# Patient Record
Sex: MALE | Race: WHITE | NOT HISPANIC OR LATINO | URBAN - METROPOLITAN AREA
[De-identification: names, ages, dates, MRNs, and addresses within clinical notes are randomized per-mention and may not be internally consistent; named-entity substitution may affect disease eponyms.]

---

## 2017-01-16 VITALS
TEMPERATURE: 98 F | DIASTOLIC BLOOD PRESSURE: 61 MMHG | HEART RATE: 67 BPM | SYSTOLIC BLOOD PRESSURE: 133 MMHG | WEIGHT: 225.97 LBS | OXYGEN SATURATION: 97 % | RESPIRATION RATE: 16 BRPM

## 2017-01-16 NOTE — ASU PATIENT PROFILE, ADULT - PSH
H/O heart artery stent    H/O total knee replacement, left    S/P arthroscopy of knee  right-2 months ago H/O heart artery stent  x2  H/O total knee replacement, left    S/P arthroscopy of knee  right-2 months ago

## 2017-01-16 NOTE — ASU PATIENT PROFILE, ADULT - PMH
CAD (coronary artery disease)    Current use of long term anticoagulation    DM (diabetes mellitus)    History of throat cancer    HLD (hyperlipidemia)    HTN (hypertension)    Lower back pain  radiating to right knee  Neuropathy BPH (benign prostatic hypertrophy)    CAD (coronary artery disease)    Current use of long term anticoagulation    Depression    DM (diabetes mellitus)  type 2  GERD (gastroesophageal reflux disease)    History of throat cancer    HLD (hyperlipidemia)    HTN (hypertension)    Lower back pain  radiating to right knee  Neuropathy

## 2017-01-17 ENCOUNTER — OUTPATIENT (OUTPATIENT)
Dept: OUTPATIENT SERVICES | Facility: HOSPITAL | Age: 67
LOS: 1 days | Discharge: HOME | End: 2017-01-17
Payer: MEDICARE

## 2017-01-17 ENCOUNTER — APPOINTMENT (OUTPATIENT)
Dept: OTOLARYNGOLOGY | Facility: HOSPITAL | Age: 67
End: 2017-01-17

## 2017-01-17 VITALS
RESPIRATION RATE: 18 BRPM | DIASTOLIC BLOOD PRESSURE: 58 MMHG | HEART RATE: 74 BPM | TEMPERATURE: 97 F | SYSTOLIC BLOOD PRESSURE: 133 MMHG | OXYGEN SATURATION: 97 %

## 2017-01-17 DIAGNOSIS — Z95.5 PRESENCE OF CORONARY ANGIOPLASTY IMPLANT AND GRAFT: Chronic | ICD-10-CM

## 2017-01-17 DIAGNOSIS — Z96.652 PRESENCE OF LEFT ARTIFICIAL KNEE JOINT: Chronic | ICD-10-CM

## 2017-01-17 DIAGNOSIS — Z98.89 OTHER SPECIFIED POSTPROCEDURAL STATES: Chronic | ICD-10-CM

## 2017-01-17 PROCEDURE — 31536 LARYNGOSCOPY W/BX & OP SCOPE: CPT

## 2017-01-17 PROCEDURE — 31540 LARYNGOSCOPY W/EXC OF TUMOR: CPT | Mod: LT

## 2017-01-17 PROCEDURE — 94660 CPAP INITIATION&MGMT: CPT

## 2017-01-17 PROCEDURE — 88305 TISSUE EXAM BY PATHOLOGIST: CPT

## 2017-01-17 RX ORDER — ACETAMINOPHEN 500 MG
325 TABLET ORAL EVERY 4 HOURS
Qty: 0 | Refills: 0 | Status: DISCONTINUED | OUTPATIENT
Start: 2017-01-17 | End: 2017-01-17

## 2017-01-17 RX ORDER — ACETAMINOPHEN WITH CODEINE 300MG-30MG
1 TABLET ORAL
Qty: 12 | Refills: 0
Start: 2017-01-17 | End: 2017-01-20

## 2017-01-17 RX ORDER — ONDANSETRON 8 MG/1
4 TABLET, FILM COATED ORAL EVERY 6 HOURS
Qty: 0 | Refills: 0 | Status: DISCONTINUED | OUTPATIENT
Start: 2017-01-17 | End: 2017-01-17

## 2017-01-17 RX ORDER — SODIUM CHLORIDE 9 MG/ML
1000 INJECTION, SOLUTION INTRAVENOUS
Qty: 0 | Refills: 0 | Status: DISCONTINUED | OUTPATIENT
Start: 2017-01-17 | End: 2017-01-17

## 2017-01-17 RX ORDER — MORPHINE SULFATE 50 MG/1
4 CAPSULE, EXTENDED RELEASE ORAL
Qty: 0 | Refills: 0 | Status: DISCONTINUED | OUTPATIENT
Start: 2017-01-17 | End: 2017-01-17

## 2017-01-17 RX ORDER — ACETAMINOPHEN WITH CODEINE 300MG-30MG
10 TABLET ORAL
Qty: 15 | Refills: 0
Start: 2017-01-17 | End: 2017-01-22

## 2017-01-17 NOTE — PACU DISCHARGE NOTE - COMMENTS
pt verbalized understanding all discharged instructions  iv disocntniuo  pt left accompanied by family menber

## 2017-01-19 LAB — SURGICAL PATHOLOGY STUDY: SIGNIFICANT CHANGE UP

## 2017-01-23 ENCOUNTER — APPOINTMENT (OUTPATIENT)
Dept: SPINE | Facility: CLINIC | Age: 67
End: 2017-01-23

## 2017-01-23 DIAGNOSIS — Z79.02 LONG TERM (CURRENT) USE OF ANTITHROMBOTICS/ANTIPLATELETS: ICD-10-CM

## 2017-01-23 DIAGNOSIS — Z79.84 LONG TERM (CURRENT) USE OF ORAL HYPOGLYCEMIC DRUGS: ICD-10-CM

## 2017-01-23 DIAGNOSIS — Z79.82 LONG TERM (CURRENT) USE OF ASPIRIN: ICD-10-CM

## 2017-01-23 DIAGNOSIS — E66.9 OBESITY, UNSPECIFIED: ICD-10-CM

## 2017-01-23 DIAGNOSIS — J38.1 POLYP OF VOCAL CORD AND LARYNX: ICD-10-CM

## 2017-01-23 DIAGNOSIS — N40.0 BENIGN PROSTATIC HYPERPLASIA WITHOUT LOWER URINARY TRACT SYMPTOMS: ICD-10-CM

## 2017-01-23 DIAGNOSIS — R23.4 CHANGES IN SKIN TEXTURE: ICD-10-CM

## 2017-01-23 DIAGNOSIS — I25.10 ATHEROSCLEROTIC HEART DISEASE OF NATIVE CORONARY ARTERY WITHOUT ANGINA PECTORIS: ICD-10-CM

## 2017-01-23 DIAGNOSIS — I10 ESSENTIAL (PRIMARY) HYPERTENSION: ICD-10-CM

## 2017-01-23 DIAGNOSIS — Z85.21 PERSONAL HISTORY OF MALIGNANT NEOPLASM OF LARYNX: ICD-10-CM

## 2017-01-23 DIAGNOSIS — E78.00 PURE HYPERCHOLESTEROLEMIA, UNSPECIFIED: ICD-10-CM

## 2017-01-23 DIAGNOSIS — F32.9 MAJOR DEPRESSIVE DISORDER, SINGLE EPISODE, UNSPECIFIED: ICD-10-CM

## 2017-01-23 DIAGNOSIS — K21.9 GASTRO-ESOPHAGEAL REFLUX DISEASE WITHOUT ESOPHAGITIS: ICD-10-CM

## 2017-01-23 DIAGNOSIS — E11.9 TYPE 2 DIABETES MELLITUS WITHOUT COMPLICATIONS: ICD-10-CM

## 2017-01-23 DIAGNOSIS — J38.3 OTHER DISEASES OF VOCAL CORDS: ICD-10-CM

## 2017-01-25 ENCOUNTER — APPOINTMENT (OUTPATIENT)
Dept: OTOLARYNGOLOGY | Facility: CLINIC | Age: 67
End: 2017-01-25

## 2017-01-25 VITALS
BODY MASS INDEX: 26.15 KG/M2 | TEMPERATURE: 97.9 F | SYSTOLIC BLOOD PRESSURE: 123 MMHG | HEIGHT: 78 IN | DIASTOLIC BLOOD PRESSURE: 65 MMHG | WEIGHT: 226 LBS | HEART RATE: 89 BPM

## 2017-01-30 ENCOUNTER — APPOINTMENT (OUTPATIENT)
Dept: OTOLARYNGOLOGY | Facility: CLINIC | Age: 67
End: 2017-01-30

## 2017-01-30 PROBLEM — K21.9 GASTRO-ESOPHAGEAL REFLUX DISEASE WITHOUT ESOPHAGITIS: Chronic | Status: ACTIVE | Noted: 2017-01-16

## 2017-01-30 PROBLEM — N40.0 BENIGN PROSTATIC HYPERPLASIA WITHOUT LOWER URINARY TRACT SYMPTOMS: Chronic | Status: ACTIVE | Noted: 2017-01-16

## 2017-02-06 ENCOUNTER — APPOINTMENT (OUTPATIENT)
Dept: SPINE | Facility: CLINIC | Age: 67
End: 2017-02-06

## 2017-03-06 ENCOUNTER — APPOINTMENT (OUTPATIENT)
Dept: SPINE | Facility: CLINIC | Age: 67
End: 2017-03-06

## 2017-03-06 VITALS
HEART RATE: 67 BPM | WEIGHT: 230 LBS | BODY MASS INDEX: 36.1 KG/M2 | SYSTOLIC BLOOD PRESSURE: 134 MMHG | DIASTOLIC BLOOD PRESSURE: 76 MMHG | HEIGHT: 67 IN

## 2017-03-27 ENCOUNTER — APPOINTMENT (OUTPATIENT)
Dept: SPINE | Facility: CLINIC | Age: 67
End: 2017-03-27

## 2017-04-19 ENCOUNTER — APPOINTMENT (OUTPATIENT)
Dept: OTOLARYNGOLOGY | Facility: CLINIC | Age: 67
End: 2017-04-19

## 2017-05-15 ENCOUNTER — APPOINTMENT (OUTPATIENT)
Dept: OTOLARYNGOLOGY | Facility: CLINIC | Age: 67
End: 2017-05-15

## 2017-08-07 ENCOUNTER — APPOINTMENT (OUTPATIENT)
Dept: OTOLARYNGOLOGY | Facility: CLINIC | Age: 67
End: 2017-08-07
Payer: MEDICARE

## 2017-08-07 PROCEDURE — 99214 OFFICE O/P EST MOD 30 MIN: CPT | Mod: 25

## 2017-08-07 PROCEDURE — 31575 DIAGNOSTIC LARYNGOSCOPY: CPT

## 2017-11-08 ENCOUNTER — APPOINTMENT (OUTPATIENT)
Dept: OTOLARYNGOLOGY | Facility: CLINIC | Age: 67
End: 2017-11-08
Payer: MEDICARE

## 2017-11-08 PROCEDURE — 99213 OFFICE O/P EST LOW 20 MIN: CPT | Mod: 25

## 2017-11-08 PROCEDURE — 31575 DIAGNOSTIC LARYNGOSCOPY: CPT

## 2018-03-12 ENCOUNTER — APPOINTMENT (OUTPATIENT)
Dept: OTOLARYNGOLOGY | Facility: CLINIC | Age: 68
End: 2018-03-12
Payer: MEDICARE

## 2018-03-12 PROCEDURE — 31575 DIAGNOSTIC LARYNGOSCOPY: CPT

## 2018-03-12 PROCEDURE — 99214 OFFICE O/P EST MOD 30 MIN: CPT | Mod: 25

## 2018-03-26 ENCOUNTER — APPOINTMENT (OUTPATIENT)
Dept: OTOLARYNGOLOGY | Facility: CLINIC | Age: 68
End: 2018-03-26
Payer: MEDICARE

## 2018-03-26 VITALS
TEMPERATURE: 98.6 F | DIASTOLIC BLOOD PRESSURE: 71 MMHG | HEART RATE: 65 BPM | SYSTOLIC BLOOD PRESSURE: 132 MMHG | WEIGHT: 230 LBS | BODY MASS INDEX: 36.1 KG/M2 | HEIGHT: 67 IN

## 2018-03-26 PROCEDURE — 31575 DIAGNOSTIC LARYNGOSCOPY: CPT

## 2018-03-26 PROCEDURE — 99213 OFFICE O/P EST LOW 20 MIN: CPT | Mod: 25

## 2018-04-02 ENCOUNTER — APPOINTMENT (OUTPATIENT)
Dept: SPINE | Facility: CLINIC | Age: 68
End: 2018-04-02
Payer: MEDICARE

## 2018-04-02 VITALS
DIASTOLIC BLOOD PRESSURE: 77 MMHG | SYSTOLIC BLOOD PRESSURE: 133 MMHG | BODY MASS INDEX: 34.21 KG/M2 | HEART RATE: 64 BPM | WEIGHT: 218 LBS | HEIGHT: 67 IN

## 2018-04-02 PROCEDURE — 99213 OFFICE O/P EST LOW 20 MIN: CPT

## 2018-04-04 ENCOUNTER — APPOINTMENT (OUTPATIENT)
Dept: OTOLARYNGOLOGY | Facility: CLINIC | Age: 68
End: 2018-04-04

## 2018-09-24 ENCOUNTER — APPOINTMENT (OUTPATIENT)
Dept: OTOLARYNGOLOGY | Facility: CLINIC | Age: 68
End: 2018-09-24
Payer: MEDICARE

## 2018-09-24 VITALS
DIASTOLIC BLOOD PRESSURE: 71 MMHG | HEIGHT: 67 IN | WEIGHT: 215 LBS | OXYGEN SATURATION: 98 % | BODY MASS INDEX: 33.74 KG/M2 | HEART RATE: 73 BPM | SYSTOLIC BLOOD PRESSURE: 148 MMHG

## 2018-09-24 PROCEDURE — 31575 DIAGNOSTIC LARYNGOSCOPY: CPT

## 2018-09-24 PROCEDURE — 99213 OFFICE O/P EST LOW 20 MIN: CPT | Mod: 25

## 2019-01-23 ENCOUNTER — APPOINTMENT (OUTPATIENT)
Dept: OTOLARYNGOLOGY | Facility: CLINIC | Age: 69
End: 2019-01-23
Payer: MEDICARE

## 2019-01-23 VITALS
OXYGEN SATURATION: 97 % | HEIGHT: 67 IN | BODY MASS INDEX: 33.74 KG/M2 | HEART RATE: 74 BPM | WEIGHT: 215 LBS | SYSTOLIC BLOOD PRESSURE: 123 MMHG | DIASTOLIC BLOOD PRESSURE: 57 MMHG

## 2019-01-23 PROCEDURE — 99213 OFFICE O/P EST LOW 20 MIN: CPT

## 2019-01-24 NOTE — ASSESSMENT
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

## 2019-01-24 NOTE — HISTORY OF PRESENT ILLNESS
[de-identified] : 67 y/o male with history of glottic cancer treated with primary radiation in 2010, developed some hoarseness last year was noted to have TVC lesion, benign on excision. Denies neck masses/lesions, dysphonia, dysphagia, weight changes, fever.  [FreeTextEntry1] : pt doing well, no issues since last visit, eating and speaking well, weight is stable

## 2019-01-24 NOTE — REVIEW OF SYSTEMS
[Negative] : Psychiatric [Hoarseness] : no hoarseness [Throat Pain] : no throat pain [Swelling Neck] : no neck swelling [Fever] : no fever [Chills] : no chills [Feeling Poorly] : not feeling poorly [Feeling Tired] : not feeling tired [Chest Pain] : no chest pain [Shortness Of Breath] : no shortness of breath [Confused] : no confusion [Convulsions] : no convulsions [Swollen Glands] : no swollen glands [Swollen Glands In The Neck] : no swollen glands in the neck

## 2019-01-24 NOTE — PROCEDURE
[de-identified] : flexible fiberoptic laryngoscope advanced orally, no oropharyngeal lesions identified, larynx visualized no ulcerating or fungating masses, no leukoplakic or erythroplakic lesions or nodules \par there is mucous in the glottis but no masses or polyps/nodules; true vocal cords abduct and adduct and meet in the midline, no sign of cancer

## 2019-07-24 ENCOUNTER — APPOINTMENT (OUTPATIENT)
Dept: OTOLARYNGOLOGY | Facility: CLINIC | Age: 69
End: 2019-07-24
Payer: MEDICARE

## 2019-07-24 VITALS
DIASTOLIC BLOOD PRESSURE: 68 MMHG | HEIGHT: 64 IN | HEART RATE: 81 BPM | OXYGEN SATURATION: 98 % | SYSTOLIC BLOOD PRESSURE: 159 MMHG | BODY MASS INDEX: 37.56 KG/M2 | WEIGHT: 220 LBS

## 2019-07-24 PROCEDURE — 31575 DIAGNOSTIC LARYNGOSCOPY: CPT

## 2019-07-24 PROCEDURE — 99212 OFFICE O/P EST SF 10 MIN: CPT | Mod: 25

## 2019-07-24 NOTE — HISTORY OF PRESENT ILLNESS
[de-identified] : 68 y/o male with history of glottic cancer treated with primary radiation in 2010, developed some hoarseness last year was noted to have TVC lesion, benign on excision. Denies neck masses/lesions, dysphonia, dysphagia, weight changes, fever. \par  pt doing well, no issues since last visit, eating and speaking well, weight is stable. \par

## 2019-07-24 NOTE — PROCEDURE
[Image(s) Captured] : image(s) captured and filed [Unable to Cooperate with Mirror] : patient unable to cooperate with mirror [None] : none [Flexible Endoscope] : examined with the flexible endoscope [Normal] : the false vocal folds were pink and regular, the ventricular sulcus was open, the true vocal folds were glistening white, tense and of equal length, mobility, and height [True Vocal Cords Paralysis] : no true vocal cord paralysis [True Vocal Cords Erythematous] : no true vocal cord edema [True Vocal Cords Ferreira's Nodules] : no true vocal cord nodules [de-identified] : surveillance for glottic cancer [de-identified] : transoral laryngosocpy [de-identified] : N

## 2019-07-24 NOTE — ASSESSMENT
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

## 2019-07-24 NOTE — REASON FOR VISIT
[Subsequent Evaluation] : a subsequent evaluation for [FreeTextEntry2] : surveillance glottic cancer

## 2019-07-24 NOTE — PHYSICAL EXAM
[Midline] : trachea located in midline position [Laryngoscopy Performed] : laryngoscopy was performed, see procedure section for findings [Normal] : extraocular movements are normal

## 2019-10-23 ENCOUNTER — APPOINTMENT (OUTPATIENT)
Dept: OTOLARYNGOLOGY | Facility: CLINIC | Age: 69
End: 2019-10-23
Payer: MEDICARE

## 2019-10-23 VITALS
SYSTOLIC BLOOD PRESSURE: 136 MMHG | DIASTOLIC BLOOD PRESSURE: 72 MMHG | OXYGEN SATURATION: 98 % | WEIGHT: 220 LBS | HEART RATE: 69 BPM | BODY MASS INDEX: 37.56 KG/M2 | HEIGHT: 64 IN

## 2019-10-23 PROCEDURE — 31575 DIAGNOSTIC LARYNGOSCOPY: CPT

## 2019-10-23 PROCEDURE — 99213 OFFICE O/P EST LOW 20 MIN: CPT | Mod: 25

## 2019-10-25 NOTE — REASON FOR VISIT
[Subsequent Evaluation] : a subsequent evaluation for [FreeTextEntry2] : surveillance of glottic cancer

## 2019-10-25 NOTE — PROCEDURE
[None] : none [Unable to Cooperate with Mirror] : patient unable to cooperate with mirror [Image(s) Captured] : image(s) captured and filed [Flexible Endoscope] : examined with the flexible endoscope [de-identified] : Flexible fiberoptic laryngoscope advanced orally and nasally, no oral or nasopharyngeal lesions or masses identified, larynx visualized, adequate and symmetric cord adduction and abduction noted, no glottic masses, lesions, leukoplakia noted.\par  good minus

## 2019-10-25 NOTE — ASSESSMENT
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

## 2019-10-25 NOTE — HISTORY OF PRESENT ILLNESS
[de-identified] : 70 y/o male with history of glottic cancer treated with primary radiation in 2010, developed some hoarseness was noted to have TVC lesion, benign on 1/17/17 excision.   \par \par Pt doing well, eating and speaking well, weight is stable. Reports sore throat for a few months, denies cough, nasal congestion, PND, fever, chills, LAD, dysphagia.

## 2019-12-04 ENCOUNTER — APPOINTMENT (OUTPATIENT)
Dept: OTOLARYNGOLOGY | Facility: CLINIC | Age: 69
End: 2019-12-04
Payer: MEDICARE

## 2019-12-04 VITALS
BODY MASS INDEX: 37.56 KG/M2 | HEIGHT: 64 IN | DIASTOLIC BLOOD PRESSURE: 68 MMHG | HEART RATE: 73 BPM | SYSTOLIC BLOOD PRESSURE: 166 MMHG | WEIGHT: 220 LBS | OXYGEN SATURATION: 98 %

## 2019-12-04 PROCEDURE — 99215 OFFICE O/P EST HI 40 MIN: CPT | Mod: 25

## 2019-12-04 PROCEDURE — 31575 DIAGNOSTIC LARYNGOSCOPY: CPT

## 2019-12-04 NOTE — HISTORY OF PRESENT ILLNESS
[FreeTextEntry1] : Reports sore throat for a few months associated with left sided globus, cough and hoarseness. He denies having any recent  viral illness that precipitated his symptoms. He has GERD/LPR and currently on omeprazole. \par \par He denies otalgia, weight loss, aspiration or dysphagia.\par \par  [de-identified] : 70 y/o male with history of glottic cancer treated with primary radiation in 2010, developed some hoarseness was noted to have TVC lesion, benign on 1/17/17 excision. \par \par

## 2019-12-04 NOTE — REASON FOR VISIT
[Subsequent Evaluation] : a subsequent evaluation for [FreeTextEntry2] : Glottic cancer surveillance with new sore throat and hoarseness

## 2019-12-04 NOTE — PHYSICAL EXAM
[Midline] : trachea located in midline position [Laryngoscopy Performed] : laryngoscopy was performed, see procedure section for findings [Normal] : cranial nerves 2-12 intact

## 2019-12-04 NOTE — ASSESSMENT
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 Normal scope with no obvious glottic or supraglottic lesions. \par - Continue to observe\par - Patient was counselled about LPR conservative measures\par - WE will consider imaging if symptoms persists\par - Patient to call office if symptoms progress or persist\par - F/u in 2  months

## 2019-12-04 NOTE — PROCEDURE
[Image(s) Captured] : image(s) captured and filed [None] : none [Flexible Endoscope] : examined with the flexible endoscope [Unable to Cooperate with Mirror] : patient unable to cooperate with mirror [Normal] : the false vocal folds were pink and regular, the ventricular sulcus was open, the true vocal folds were glistening white, tense and of equal length, mobility, and height [True Vocal Cords Erythematous] : no true vocal cord edema [True Vocal Cords Paralysis] : no true vocal cord paralysis [True Vocal Cords Ferreira's Nodules] : no true vocal cord nodules [de-identified] : advanced trans orally

## 2020-01-22 ENCOUNTER — APPOINTMENT (OUTPATIENT)
Dept: OTOLARYNGOLOGY | Facility: CLINIC | Age: 70
End: 2020-01-22

## 2020-03-04 ENCOUNTER — APPOINTMENT (OUTPATIENT)
Dept: OTOLARYNGOLOGY | Facility: CLINIC | Age: 70
End: 2020-03-04
Payer: MEDICARE

## 2020-03-04 VITALS
WEIGHT: 220 LBS | TEMPERATURE: 98.7 F | BODY MASS INDEX: 37.56 KG/M2 | OXYGEN SATURATION: 98 % | HEART RATE: 82 BPM | HEIGHT: 64 IN | SYSTOLIC BLOOD PRESSURE: 174 MMHG | DIASTOLIC BLOOD PRESSURE: 66 MMHG

## 2020-03-04 PROCEDURE — 31575 DIAGNOSTIC LARYNGOSCOPY: CPT | Mod: 58

## 2020-03-04 PROCEDURE — 99215 OFFICE O/P EST HI 40 MIN: CPT | Mod: 25

## 2020-03-04 NOTE — PROCEDURE
[Image(s) Captured] : image(s) captured and filed [Unable to Cooperate with Mirror] : patient unable to cooperate with mirror [None] : none [Flexible Endoscope] : examined with the flexible endoscope [Normal] : normal vallecula [Epiglottal Inflammation] : ~M edematous bilaterally [Glottis Edema] : ~M edematous in the midline [Epiglottis] : ~M hypertrophic in the midline

## 2020-03-05 NOTE — ASSESSMENT
[FreeTextEntry1] : No evidence of glottic disease\par Chronic laryngitis with thick secretions\par LPR/GERD on lifestyle modification and pantoprazole\par \par - Trial of zpack for laryngitis\par - Continue LPR treatment- consider GI consult if no improvement after the antibiotic trial for laryngitis\par - Patient to call clinic after the completion of the antibiotic

## 2020-03-05 NOTE — HISTORY OF PRESENT ILLNESS
[de-identified] : 70 y/o male with history of glottic cancer treated with primary radiation in 2010, developed some hoarseness was noted to have TVC lesion, benign on 1/17/17 excision. \par \par \par  [FreeTextEntry1] : Patient reports fluctuating hoarseness for the last couple of weeks associated with throat clearing, globus, night time coughing and choking. He reports heartburn and GERD and he has been on dietary/lifestyle modification& pantoprazole which helps to relieve his symptoms. He denies weight loss, night sweats, pneumonia, neck mass, BRENDA or pain.

## 2020-06-03 ENCOUNTER — APPOINTMENT (OUTPATIENT)
Dept: OTOLARYNGOLOGY | Facility: CLINIC | Age: 70
End: 2020-06-03

## 2020-07-16 NOTE — BRIEF OPERATIVE NOTE - TYPE OF ANESTHESIA
This nurse assisted pt to the bathroom via wheelchair at this time. Pt was able to get self out of bed and too wheelchair. Pt moves slowly but does most of work herself. Pt is scared to put weight on left leg in fear of pain. Pt able to pivot to wheelchair from bed and from wheelchair to toliet. Pt states, \"the pain is about the same as it is normally, it is better than it was last night. \"  Dr. Zuhair Wright aware.       May Pickett RN  07/16/20 2663 General

## 2020-10-19 ENCOUNTER — APPOINTMENT (OUTPATIENT)
Dept: OTOLARYNGOLOGY | Facility: CLINIC | Age: 70
End: 2020-10-19
Payer: MEDICARE

## 2020-10-19 PROCEDURE — 31575 DIAGNOSTIC LARYNGOSCOPY: CPT

## 2020-10-19 PROCEDURE — 99213 OFFICE O/P EST LOW 20 MIN: CPT | Mod: 25

## 2020-10-20 RX ORDER — AZITHROMYCIN 500 MG/1
500 TABLET, FILM COATED ORAL DAILY
Qty: 3 | Refills: 0 | Status: DISCONTINUED | COMMUNITY
Start: 2020-03-04 | End: 2020-10-20

## 2020-10-21 NOTE — ADDENDUM
[FreeTextEntry1] : Speech Pathology:\par \par Pt seen today in conjunction with Dr. Davis [facility].\par \par Pt with history of glottic cancer treated with primary radiation in 2010, developed some hoarseness was noted to have TVC lesion, benign on 1/17/17 excision.  During his follow-up visit on 3/4/20, patient endorsed increased dysphonia x 2 weeks.associated with throat clearing, globus, night time coughing and choking.  Exam at that time showed generalized laryngeal edema and erythema.  Pt recommended Z-pack and to continue with reflux precautions and PPI. \par \par Today, pt reported increased dysphonia he endorsed in March has now resolved, and that his voice is now back to his baseline.  Repeat laryngoscopy today was generally unremarkable with SAMIRA.  Pt advised to monitor voice and RTC for further evaluation prn.  \par \par Renan Adler MS, CCC-SLP, BCS-S\par Board-Certified Specialist in Swallowing Disorders\par Supervisor, Speech Pathology\par

## 2020-10-21 NOTE — PROCEDURE
[Unable to Cooperate with Mirror] : patient unable to cooperate with mirror [Hoarseness] : hoarseness not clearly evaluated by indirect laryngoscopy [Image(s) Captured] : image(s) captured and filed [Flexible Endoscope] : examined with the flexible endoscope [None] : none [de-identified] : Flexible fiberoptic laryngoscope advanced orally and nasally, no naso- or melissa-pharyngeal lesions or masses identified, larynx visualized, adequate and symmetric cord adduction and abduction noted, no vocal cord masses/lesions/leukoplakia/nodules, minimal post cricoid edema\par

## 2020-10-21 NOTE — REASON FOR VISIT
[Subsequent Evaluation] : a subsequent evaluation for [FreeTextEntry2] : glottic cancer surveillance

## 2020-10-21 NOTE — HISTORY OF PRESENT ILLNESS
[de-identified] : 69 y/o male with history of glottic cancer treated with primary radiation in 2010, developed some hoarseness and was noted to have TVC lesion, benign on 1/17/17 excision. Hx heartburn and GERD and he has been on dietary/lifestyle modification & pantoprazole which helps to relieve his symptoms. He was last seen in March when he c/o fluctuating hoarseness, throat clearing, coughing, globus, rx azithromycin for laryngitis.  He reports today that his voice is better.  He denies weight loss, fever, chills, night sweats, pneumonia, neck mass, BRENDA or pain. \par

## 2020-10-21 NOTE — ASSESSMENT
[FreeTextEntry1] : 70M with hx glottic cancer treated by primary RT in 2010 with no evidence of glottic disease, SAMIRA today.\par \par Plan:\par - Continue LPR/GERD lifestyle modification and PPI.\par - F/u 6 months.\par - RTC sooner should any worrisome symptoms develop.\par - Pt verbalized understanding of above.

## 2020-11-11 ENCOUNTER — APPOINTMENT (OUTPATIENT)
Dept: SPINE | Facility: CLINIC | Age: 70
End: 2020-11-11

## 2020-12-16 ENCOUNTER — APPOINTMENT (OUTPATIENT)
Dept: SPINE | Facility: CLINIC | Age: 70
End: 2020-12-16
Payer: MEDICARE

## 2020-12-16 PROCEDURE — 99072 ADDL SUPL MATRL&STAF TM PHE: CPT

## 2020-12-16 PROCEDURE — 99213 OFFICE O/P EST LOW 20 MIN: CPT

## 2020-12-16 NOTE — HISTORY OF PRESENT ILLNESS
[FreeTextEntry1] : HERMANN JAVIER is a 70 year old gentleman who underwent a left retroperitoneal exposure with L4-L5 diskectomy and nonstructural allograft interbody arthrodesis L4-L5 nonsegmental screw cole fixation on 05/3/2016 which he stated that he recovered well from.  The patient stated that he started with low back pain into his buttocks about 5-6 months ago.  He was seen by Dr. Villavicencio, who did a left knee replacement about 7 years ago, who ordered a CT scan of the lumbar spine and referred him back to this office.  The patient stated that he did physical therapy and received epidural injections over a year ago with good results.  He stated that he takes Advil which helps his pain.  He also went to a chiropractor 3 or 4 months ago which helped his pain..\par

## 2020-12-16 NOTE — PHYSICAL EXAM
[Person] : oriented to person [Place] : oriented to place [Time] : oriented to time [Short Term Intact] : short term memory intact [Remote Intact] : remote memory intact [Span Intact] : the attention span was normal [Concentration Intact] : normal concentrating ability [Fluency] : fluency intact [Comprehension] : comprehension intact [Current Events] : adequate knowledge of current events [Past History] : adequate knowledge of personal past history [Vocabulary] : adequate range of vocabulary [Cranial Nerves Optic (II)] : visual acuity intact bilaterally,  pupils equal round and reactive to light [Cranial Nerves Oculomotor (III)] : extraocular motion intact [Cranial Nerves Trigeminal (V)] : facial sensation intact symmetrically [Cranial Nerves Facial (VII)] : face symmetrical [Cranial Nerves Vestibulocochlear (VIII)] : hearing was intact bilaterally [Cranial Nerves Glossopharyngeal (IX)] : tongue and palate midline [Cranial Nerves Accessory (XI - Cranial And Spinal)] : head turning and shoulder shrug symmetric [Cranial Nerves Hypoglossal (XII)] : there was no tongue deviation with protrusion [Motor Tone] : muscle tone was normal in all four extremities [Motor Strength] : muscle strength was normal in all four extremities [No Muscle Atrophy] : normal bulk in all four extremities [5] : S1 toe walking 5/5 [Sensation Tactile Decrease] : light touch was intact [Abnormal Walk] : normal gait [Balance] : balance was intact [0] : Ankle jerk right 0 [2+] : Ankle jerk left 2+ [Past-pointing] : there was no past-pointing [Tremor] : no tremor present [FreeTextEntry1] : Pain with standing up onto a stool with his right leg.

## 2020-12-16 NOTE — REASON FOR VISIT
[Follow-Up: _____] : a [unfilled] follow-up visit [Family Member] : family member [FreeTextEntry1] : The patient is here for review of a new CT scan of the lumbar spine without contrast.

## 2020-12-16 NOTE — RESULTS/DATA
[FreeTextEntry1] : Good construct, alignment and placement of the hardware.  No evidence of significant foraminal or canal stenosis.

## 2021-01-12 ENCOUNTER — NON-APPOINTMENT (OUTPATIENT)
Age: 71
End: 2021-01-12

## 2021-01-14 ENCOUNTER — APPOINTMENT (OUTPATIENT)
Dept: PULMONOLOGY | Facility: CLINIC | Age: 71
End: 2021-01-14
Payer: MEDICARE

## 2021-01-14 ENCOUNTER — LABORATORY RESULT (OUTPATIENT)
Age: 71
End: 2021-01-14

## 2021-01-14 VITALS
RESPIRATION RATE: 14 BRPM | DIASTOLIC BLOOD PRESSURE: 74 MMHG | BODY MASS INDEX: 33.12 KG/M2 | TEMPERATURE: 98.6 F | SYSTOLIC BLOOD PRESSURE: 159 MMHG | OXYGEN SATURATION: 97 % | HEART RATE: 71 BPM | HEIGHT: 67 IN | WEIGHT: 211 LBS

## 2021-01-14 PROCEDURE — 99072 ADDL SUPL MATRL&STAF TM PHE: CPT

## 2021-01-14 PROCEDURE — 99214 OFFICE O/P EST MOD 30 MIN: CPT

## 2021-01-19 ENCOUNTER — APPOINTMENT (OUTPATIENT)
Dept: PULMONOLOGY | Facility: CLINIC | Age: 71
End: 2021-01-19
Payer: MEDICARE

## 2021-01-19 VITALS
RESPIRATION RATE: 14 BRPM | TEMPERATURE: 98.7 F | HEART RATE: 72 BPM | WEIGHT: 213 LBS | OXYGEN SATURATION: 96 % | DIASTOLIC BLOOD PRESSURE: 77 MMHG | HEIGHT: 64 IN | BODY MASS INDEX: 36.37 KG/M2 | SYSTOLIC BLOOD PRESSURE: 149 MMHG

## 2021-01-19 LAB
ALBUMIN SERPL ELPH-MCNC: 3.4 G/DL
ALP BLD-CCNC: 105 U/L
ALT SERPL-CCNC: 25 U/L
ANION GAP SERPL CALC-SCNC: 15 MMOL/L
AST SERPL-CCNC: 28 U/L
BASOPHILS # BLD AUTO: 0.01 K/UL
BASOPHILS NFR BLD AUTO: 0.2 %
BILIRUB SERPL-MCNC: 0.4 MG/DL
BUN SERPL-MCNC: 22 MG/DL
CALCIUM SERPL-MCNC: 8.8 MG/DL
CHLORIDE SERPL-SCNC: 96 MMOL/L
CHOLEST SERPL-MCNC: 133 MG/DL
CO2 SERPL-SCNC: 23 MMOL/L
CREAT SERPL-MCNC: 1.59 MG/DL
DEPRECATED D DIMER PPP IA-ACNC: 510 NG/ML DDU
EOSINOPHIL # BLD AUTO: 0.02 K/UL
EOSINOPHIL NFR BLD AUTO: 0.4 %
ESTIMATED AVERAGE GLUCOSE: 160 MG/DL
FERRITIN SERPL-MCNC: 246 NG/ML
GLUCOSE SERPL-MCNC: 274 MG/DL
HBA1C MFR BLD HPLC: 7.2 %
HCT VFR BLD CALC: 39.2 %
HDLC SERPL-MCNC: 31 MG/DL
HGB BLD-MCNC: 12.7 G/DL
IL6 SERPL-MCNC: 37.9 PG/ML
IMM GRANULOCYTES NFR BLD AUTO: 0.4 %
LDLC SERPL CALC-MCNC: 69 MG/DL
LYMPHOCYTES # BLD AUTO: 0.74 K/UL
LYMPHOCYTES NFR BLD AUTO: 14.6 %
MAN DIFF?: NORMAL
MCHC RBC-ENTMCNC: 29.3 PG
MCHC RBC-ENTMCNC: 32.4 GM/DL
MCV RBC AUTO: 90.3 FL
MONOCYTES # BLD AUTO: 0.42 K/UL
MONOCYTES NFR BLD AUTO: 8.3 %
NEUTROPHILS # BLD AUTO: 3.87 K/UL
NEUTROPHILS NFR BLD AUTO: 76.1 %
NONHDLC SERPL-MCNC: 102 MG/DL
PLATELET # BLD AUTO: 185 K/UL
POTASSIUM SERPL-SCNC: 4.3 MMOL/L
PROT SERPL-MCNC: 6 G/DL
RBC # BLD: 4.34 M/UL
RBC # FLD: 14.6 %
SARS-COV-2 IGG SERPL IA-ACNC: 0.91 INDEX
SARS-COV-2 IGG SERPL QL IA: NEGATIVE
SODIUM SERPL-SCNC: 133 MMOL/L
TRIGL SERPL-MCNC: 164 MG/DL
TSH SERPL-ACNC: 0.08 UIU/ML
WBC # FLD AUTO: 5.08 K/UL

## 2021-01-19 PROCEDURE — 99072 ADDL SUPL MATRL&STAF TM PHE: CPT

## 2021-01-19 PROCEDURE — 99214 OFFICE O/P EST MOD 30 MIN: CPT

## 2021-01-19 NOTE — HISTORY OF PRESENT ILLNESS
[Former] : former [Never] : never [TextBox_4] : Patient is a 70-year-old male with past medical history significant for laryngeal carcinoma, Hypertension, coronary artery disease who was recently admitted to the hospital for a deep vein thrombosis and congestive heart failure exacerbation The patient presents today for further management. He has improved with the use of his diuretic. He currently denies fevers chills chest pain weight loss or hemoptysis

## 2021-01-19 NOTE — REASON FOR VISIT
[Follow-Up] : a follow-up visit [Cough] : cough [Shortness of Breath] : shortness of breath [Wheezing] : wheezing [Spouse] : spouse [TextBox_44] : Deep vein thrombosis

## 2021-01-19 NOTE — ASSESSMENT
[FreeTextEntry1] : In summary the patient is a 70-year-old male with past medical history significant for hypertension, coronary artery disease, high cholesterol, history of laryngeal carcinoma who was recently admitted to the hospital for a congestive heart failure exacerbation The patient's physical exam is significant for improved air entry bilaterally.I reviewed the patient's medications with him and his wife I am taking the liberty of changing his diuretic to 3 times a week. I am also referring him to urology because of his renal cysts. Patient is instructed to followup in one month

## 2021-01-19 NOTE — PHYSICAL EXAM
[No Acute Distress] : no acute distress [Normal Oropharynx] : normal oropharynx [Normal Appearance] : normal appearance [No Neck Mass] : no neck mass [Normal Rate/Rhythm] : normal rate/rhythm [Normal S1, S2] : normal s1, s2 [No Murmurs] : no murmurs [No Resp Distress] : no resp distress [Clear to Auscultation Bilaterally] : clear to auscultation bilaterally [No Abnormalities] : no abnormalities [Benign] : benign [Normal Gait] : normal gait [No Clubbing] : no clubbing [No Cyanosis] : no cyanosis [FROM] : FROM [No Edema] : no edema [Normal Color/ Pigmentation] : normal color/ pigmentation [No Focal Deficits] : no focal deficits [Oriented x3] : oriented x3 [Normal Affect] : normal affect

## 2021-01-20 NOTE — REASON FOR VISIT
[Follow-Up - From Hospitalization] : a follow-up visit after a recent hospitalization [Cough] : cough [Shortness of Breath] : shortness of breath [Spouse] : spouse

## 2021-01-20 NOTE — ASSESSMENT
[FreeTextEntry1] : In summary the patient is a 70-year-old male with congestive heart failure, DVT, diabetes, hypertension who was recently discharged from the hospital presents for follow-up.  Patient's physical exam is significant for scattered rhonchi bilaterally.  I have instructed the patient's wife to bring his discharge medicines on his next visit.  I have also instructed him to avoid salt and to follow-up in 1 week

## 2021-01-20 NOTE — REVIEW OF SYSTEMS
[Cough] : cough [Hemoptysis] : no hemoptysis [Sputum] : sputum [Dyspnea] : dyspnea [SOB on Exertion] : sob on exertion [Negative] : Endocrine

## 2021-01-20 NOTE — HISTORY OF PRESENT ILLNESS
[Former] : former [Never] : never [TextBox_4] : Patient is a 70-year-old male with past medical history significant for congestive heart failure recently admitted for DVT and CHF exacerbation who presents for follow-up.  The patient complains of worsening cough and shortness of breath with dyspnea on exertion.  He is currently confused regarding his discharge medications.  He denies fevers chills chest pain weight loss or hemoptysis

## 2021-01-22 ENCOUNTER — APPOINTMENT (OUTPATIENT)
Dept: UROLOGY | Facility: CLINIC | Age: 71
End: 2021-01-22
Payer: MEDICARE

## 2021-01-22 VITALS
TEMPERATURE: 97.7 F | HEART RATE: 78 BPM | HEIGHT: 64 IN | BODY MASS INDEX: 36.37 KG/M2 | WEIGHT: 213 LBS | DIASTOLIC BLOOD PRESSURE: 77 MMHG | SYSTOLIC BLOOD PRESSURE: 147 MMHG

## 2021-01-22 PROCEDURE — 99203 OFFICE O/P NEW LOW 30 MIN: CPT

## 2021-01-22 PROCEDURE — 99072 ADDL SUPL MATRL&STAF TM PHE: CPT

## 2021-01-22 NOTE — ASSESSMENT
[FreeTextEntry1] : 71 yo with PSA elevation history, renal cyst\par \par - obtain records from [facility]\par - 4K\par - renal and bladder US\par - urine studies\par - f/u in 2 weeks to review

## 2021-01-22 NOTE — LETTER BODY
[Dear  ___] : Dear  [unfilled], [Consult Letter:] : I had the pleasure of evaluating your patient, [unfilled]. [Please see my note below.] : Please see my note below. [Sincerely,] : Sincerely, [FreeTextEntry3] : Kendy Sher MD, FACS\par

## 2021-01-22 NOTE — HISTORY OF PRESENT ILLNESS
[FreeTextEntry1] : 69 yo with multiple significant comorbidities\par referred for renal cysts - \par do not have imaging relevant to the cysts in question - last imaging was a CT scan from [facility]\par

## 2021-02-23 ENCOUNTER — LABORATORY RESULT (OUTPATIENT)
Age: 71
End: 2021-02-23

## 2021-02-25 ENCOUNTER — APPOINTMENT (OUTPATIENT)
Dept: PULMONOLOGY | Facility: CLINIC | Age: 71
End: 2021-02-25
Payer: MEDICARE

## 2021-02-25 VITALS
DIASTOLIC BLOOD PRESSURE: 81 MMHG | RESPIRATION RATE: 15 BRPM | WEIGHT: 212 LBS | OXYGEN SATURATION: 97 % | TEMPERATURE: 97.1 F | HEART RATE: 79 BPM | BODY MASS INDEX: 36.19 KG/M2 | HEIGHT: 64 IN | SYSTOLIC BLOOD PRESSURE: 132 MMHG

## 2021-02-25 PROCEDURE — 99214 OFFICE O/P EST MOD 30 MIN: CPT

## 2021-02-25 PROCEDURE — 99072 ADDL SUPL MATRL&STAF TM PHE: CPT

## 2021-03-02 NOTE — REASON FOR VISIT
[Follow-Up] : a follow-up visit [Cough] : cough [Shortness of Breath] : shortness of breath [Spouse] : spouse

## 2021-03-02 NOTE — HISTORY OF PRESENT ILLNESS
[Former] : former [Never] : never [TextBox_4] : Patient is a 70-year-old male with past medical history significant for laryngeal carcinoma, hypertension, coronary artery disease who was recently admitted to the hospital for CHF exacerbation.  The patient presents today for further management he states that his cough shortness of breath weight gain and dyspnea on exertion have all improved with the use of his diuretics.  He denies fevers chills chest pain weight loss or hemoptysis.  He is currently on anticoagulation for DVT and being worked up for a renal mass

## 2021-03-02 NOTE — ASSESSMENT
[FreeTextEntry1] : In summary the patient is a 70-year-old male with congestive heart failure, DVT, diabetes, hypertension who was recently discharged from the hospital presents for follow-up.  Patient's physical exam is significant for improved air entry bilaterally\par Prescription renewal performed\par Patient instructed to continue current medications and to follow up in 3 months

## 2021-03-04 LAB
4K SCORE CALCULATION: 4 %
APPEARANCE: CLEAR
BACTERIA UR CULT: NORMAL
BILIRUBIN URINE: NEGATIVE
BLOOD URINE: NEGATIVE
COLOR: YELLOW
FREE PSA: 1.21 NG/ML
GLUCOSE QUALITATIVE U: NEGATIVE
KETONES URINE: NEGATIVE
LEUKOCYTE ESTERASE URINE: NEGATIVE
NITRITE URINE: NEGATIVE
PERCENT FREE PSA: 29 %
PH URINE: 6.5
PROTEIN URINE: ABNORMAL
PSA FREE FLD-MCNC: 30 %
PSA FREE SERPL-MCNC: 1.26 NG/ML
PSA SERPL-MCNC: 4.15 NG/ML
SPECIFIC GRAVITY URINE: 1.02
TOTAL PSA: 4.11 NG/ML
URINE CYTOLOGY: NORMAL
UROBILINOGEN URINE: NORMAL

## 2021-03-05 ENCOUNTER — APPOINTMENT (OUTPATIENT)
Dept: UROLOGY | Facility: CLINIC | Age: 71
End: 2021-03-05
Payer: MEDICARE

## 2021-03-05 VITALS
WEIGHT: 226 LBS | BODY MASS INDEX: 37.65 KG/M2 | SYSTOLIC BLOOD PRESSURE: 162 MMHG | HEIGHT: 65 IN | DIASTOLIC BLOOD PRESSURE: 79 MMHG | RESPIRATION RATE: 14 BRPM | HEART RATE: 65 BPM | TEMPERATURE: 98 F | OXYGEN SATURATION: 98 %

## 2021-03-05 PROCEDURE — 99213 OFFICE O/P EST LOW 20 MIN: CPT

## 2021-03-05 PROCEDURE — 99072 ADDL SUPL MATRL&STAF TM PHE: CPT

## 2021-03-22 NOTE — ASSESSMENT
[FreeTextEntry1] : 69 yo with LEFT renal cortical lesion\par I discussed the findings in detail\par \par - I need the DISC for review it needs to be internalized\par - the patient will be scheduled once I review the images internally

## 2021-03-22 NOTE — HISTORY OF PRESENT ILLNESS
[FreeTextEntry1] : 69 yo with multiple significant comorbidities\par referred for renal cysts - \par I obtained a CT scan from [facility]\par the CT scan shows a 3.2 cm enhancing renal mass\par the lower portion of the chest does not reveal evidence of metastatic disease\par \par the CT scan was reviewed in detail\par I explained the need for surgical intervention\par I explained the risk of clinical progression in untreated renal cortical lesions

## 2021-03-23 ENCOUNTER — APPOINTMENT (OUTPATIENT)
Dept: INTERNAL MEDICINE | Facility: CLINIC | Age: 71
End: 2021-03-23
Payer: MEDICAID

## 2021-03-23 VITALS
RESPIRATION RATE: 14 BRPM | OXYGEN SATURATION: 98 % | HEART RATE: 98 BPM | TEMPERATURE: 97 F | HEIGHT: 65 IN | DIASTOLIC BLOOD PRESSURE: 70 MMHG | SYSTOLIC BLOOD PRESSURE: 150 MMHG | BODY MASS INDEX: 36.65 KG/M2 | WEIGHT: 220 LBS

## 2021-03-23 DIAGNOSIS — M54.16 RADICULOPATHY, LUMBAR REGION: ICD-10-CM

## 2021-03-23 DIAGNOSIS — K29.70 GASTRITIS, UNSPECIFIED, W/OUT BLEEDING: ICD-10-CM

## 2021-03-23 PROCEDURE — 99203 OFFICE O/P NEW LOW 30 MIN: CPT | Mod: 25

## 2021-03-23 PROCEDURE — 99213 OFFICE O/P EST LOW 20 MIN: CPT

## 2021-03-23 PROCEDURE — 99072 ADDL SUPL MATRL&STAF TM PHE: CPT

## 2021-03-23 PROCEDURE — 36415 COLL VENOUS BLD VENIPUNCTURE: CPT

## 2021-03-24 NOTE — PLAN
[FreeTextEntry1] : Blood and urine tests sent to the lab \par \par Tylenol for pain\par \par Orthopedic evaluation\par \par Continue same medications\par RTO in 2 weeks

## 2021-03-24 NOTE — END OF VISIT
[FreeTextEntry3] : I, Vale Velasquez, personally transcribed these services in the presence of Dr. Steven Diaz MD. I, Dr. Steven Diaz MD, personally performed the services described in this documentation as scribed by Vale Velasquez in my presence and it is both accurate and complete.

## 2021-03-24 NOTE — HEALTH RISK ASSESSMENT
[Good] : ~his/her~  mood as  good [Never (0 pts)] : Never (0 points) [No] : In the past 12 months have you used drugs other than those required for medical reasons? No [No falls in past year] : Patient reported no falls in the past year [0] : 2) Feeling down, depressed, or hopeless: Not at all (0) [] : No [Audit-CScore] : 0 [de-identified] : lightly active [de-identified] : regular [[id number]] : 0

## 2021-03-24 NOTE — HISTORY OF PRESENT ILLNESS
[FreeTextEntry1] : Evaluation [de-identified] : HERMANN JAVIER is a 70 year old male patient with a PMHx of kidney CA, CHF, DVT, laryngeal (vocal cord) CA s/p surgical excision, CAD s/p coronary artery stent placement, DM, HTN, HLD, gout, vitamin D deficiency, anxiety, and depression who presents today for evaluation. He was seen by pulmonary three weeks ago after he was discharged from the hospital. He was hospitalized for four days and was evaluated for COVID-19. \par pt was also seen by  and he was found to have an exophytic lesion left  kidney. recommendations were made for CT scan of abdomen and pelvis\par \par Patient relates lower back pain for two months and right shoulder pain for two weeks. Otherwise, he is feeling well.\par

## 2021-03-24 NOTE — PHYSICAL EXAM
[No Acute Distress] : no acute distress [Well Nourished] : well nourished [Well Developed] : well developed [Well-Appearing] : well-appearing [Normal Sclera/Conjunctiva] : normal sclera/conjunctiva [PERRL] : pupils equal round and reactive to light [EOMI] : extraocular movements intact [Normal Outer Ear/Nose] : the outer ears and nose were normal in appearance [Normal Oropharynx] : the oropharynx was normal [No JVD] : no jugular venous distention [No Lymphadenopathy] : no lymphadenopathy [Supple] : supple [Thyroid Normal, No Nodules] : the thyroid was normal and there were no nodules present [No Respiratory Distress] : no respiratory distress  [No Accessory Muscle Use] : no accessory muscle use [Clear to Auscultation] : lungs were clear to auscultation bilaterally [Normal Rate] : normal rate  [Regular Rhythm] : with a regular rhythm [Normal S1, S2] : normal S1 and S2 [No Murmur] : no murmur heard [No Carotid Bruits] : no carotid bruits [No Abdominal Bruit] : a ~M bruit was not heard ~T in the abdomen [No Varicosities] : no varicosities [Pedal Pulses Present] : the pedal pulses are present [No Edema] : there was no peripheral edema [No Palpable Aorta] : no palpable aorta [No Extremity Clubbing/Cyanosis] : no extremity clubbing/cyanosis [Soft] : abdomen soft [Non Tender] : non-tender [Non-distended] : non-distended [No Masses] : no abdominal mass palpated [No HSM] : no HSM [Normal Bowel Sounds] : normal bowel sounds [Normal Posterior Cervical Nodes] : no posterior cervical lymphadenopathy [Normal Anterior Cervical Nodes] : no anterior cervical lymphadenopathy [No CVA Tenderness] : no CVA  tenderness [No Spinal Tenderness] : no spinal tenderness [No Joint Swelling] : no joint swelling [Grossly Normal Strength/Tone] : grossly normal strength/tone [No Rash] : no rash [Coordination Grossly Intact] : coordination grossly intact [No Focal Deficits] : no focal deficits [Normal Gait] : normal gait [Deep Tendon Reflexes (DTR)] : deep tendon reflexes were 2+ and symmetric [Normal Affect] : the affect was normal [Normal Insight/Judgement] : insight and judgment were intact [de-identified] : obese [de-identified] : male gynecomastia [FreeTextEntry1] : deferred

## 2021-04-16 ENCOUNTER — APPOINTMENT (OUTPATIENT)
Dept: INTERNAL MEDICINE | Facility: CLINIC | Age: 71
End: 2021-04-16
Payer: MEDICAID

## 2021-04-16 ENCOUNTER — APPOINTMENT (OUTPATIENT)
Dept: UROLOGY | Facility: CLINIC | Age: 71
End: 2021-04-16
Payer: MEDICAID

## 2021-04-16 VITALS
TEMPERATURE: 97.5 F | BODY MASS INDEX: 36.99 KG/M2 | DIASTOLIC BLOOD PRESSURE: 69 MMHG | OXYGEN SATURATION: 98 % | HEIGHT: 65 IN | HEART RATE: 90 BPM | SYSTOLIC BLOOD PRESSURE: 190 MMHG | WEIGHT: 222 LBS | RESPIRATION RATE: 14 BRPM

## 2021-04-16 PROCEDURE — 99072 ADDL SUPL MATRL&STAF TM PHE: CPT

## 2021-04-16 PROCEDURE — 99214 OFFICE O/P EST MOD 30 MIN: CPT

## 2021-04-16 PROCEDURE — 99213 OFFICE O/P EST LOW 20 MIN: CPT

## 2021-04-16 NOTE — ASSESSMENT
[FreeTextEntry1] : 71 yo with LEFT renal cortical lesion\par I discussed the findings in detail\par explained the surgery in detail\par explained that he will be undergoing a LEFT partial nephrectomy\par full R+B explained\par all options provided\par \par - I requested the DISC for review it needs to be internalized at [facility]\par -PAST\par - full R+B explained\par \par the potential for malignancy was explained to be in excess of 75-80%\par all questions answered\par mgcitrate bowel prep prior

## 2021-04-17 NOTE — PHYSICAL EXAM
[No Acute Distress] : no acute distress [Well Nourished] : well nourished [Well Developed] : well developed [Well-Appearing] : well-appearing [Normal Sclera/Conjunctiva] : normal sclera/conjunctiva [PERRL] : pupils equal round and reactive to light [EOMI] : extraocular movements intact [Normal Outer Ear/Nose] : the outer ears and nose were normal in appearance [Normal Oropharynx] : the oropharynx was normal [No JVD] : no jugular venous distention [No Lymphadenopathy] : no lymphadenopathy [Supple] : supple [Thyroid Normal, No Nodules] : the thyroid was normal and there were no nodules present [No Respiratory Distress] : no respiratory distress  [No Accessory Muscle Use] : no accessory muscle use [Clear to Auscultation] : lungs were clear to auscultation bilaterally [Normal Rate] : normal rate  [Regular Rhythm] : with a regular rhythm [Normal S1, S2] : normal S1 and S2 [No Murmur] : no murmur heard [No Carotid Bruits] : no carotid bruits [No Abdominal Bruit] : a ~M bruit was not heard ~T in the abdomen [No Varicosities] : no varicosities [Pedal Pulses Present] : the pedal pulses are present [No Edema] : there was no peripheral edema [No Palpable Aorta] : no palpable aorta [No Extremity Clubbing/Cyanosis] : no extremity clubbing/cyanosis [Soft] : abdomen soft [Non Tender] : non-tender [Non-distended] : non-distended [No Masses] : no abdominal mass palpated [No HSM] : no HSM [Normal Bowel Sounds] : normal bowel sounds [Normal Posterior Cervical Nodes] : no posterior cervical lymphadenopathy [Normal Anterior Cervical Nodes] : no anterior cervical lymphadenopathy [No CVA Tenderness] : no CVA  tenderness [No Spinal Tenderness] : no spinal tenderness [No Joint Swelling] : no joint swelling [Grossly Normal Strength/Tone] : grossly normal strength/tone [No Rash] : no rash [Coordination Grossly Intact] : coordination grossly intact [No Focal Deficits] : no focal deficits [Normal Gait] : normal gait [Deep Tendon Reflexes (DTR)] : deep tendon reflexes were 2+ and symmetric [Normal Affect] : the affect was normal [Normal Insight/Judgement] : insight and judgment were intact [de-identified] : obese [FreeTextEntry1] : deferred

## 2021-04-17 NOTE — HISTORY OF PRESENT ILLNESS
[Spouse] : spouse [FreeTextEntry1] : Follow-up left renal mass and DM [de-identified] : HERMANN JAVIER is a 70 year old male with a PMHx of CAD s/p coronary artery stent placement, HLD, HTN, BPH, DM, CHF, kidney CA?  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 Right mid pole 2 mm calculus\par \par Pt was seen by  today and pt will be scheduled for left nephrectomy mid May.\par \par \par

## 2021-04-17 NOTE — END OF VISIT
[FreeTextEntry3] : I, April Sharma, personally transcribed these services in the presence of Dr. Steven Diaz MD. I, Dr. Steven Diaz MD, personally performed the services described in this documentation as scribed by April Sharma in my presence and it is both accurate and complete.\par

## 2021-04-17 NOTE — HEALTH RISK ASSESSMENT
[Never (0 pts)] : Never (0 points) [No] : In the past 12 months have you used drugs other than those required for medical reasons? No [No falls in past year] : Patient reported no falls in the past year [0] : 2) Feeling down, depressed, or hopeless: Not at all (0) [Good] : ~his/her~  mood as  good [] : No [Audit-CScore] : 0 [de-identified] : lightly active [de-identified] : regular [[id number]] : 0

## 2021-04-20 LAB
25(OH)D3 SERPL-MCNC: 88.5 NG/ML
ALBUMIN SERPL ELPH-MCNC: 4.1 G/DL
ALP BLD-CCNC: 115 U/L
ALT SERPL-CCNC: 32 U/L
ANION GAP SERPL CALC-SCNC: 17 MMOL/L
APPEARANCE: CLEAR
AST SERPL-CCNC: 36 U/L
BACTERIA: NEGATIVE
BASOPHILS # BLD AUTO: 0.05 K/UL
BASOPHILS NFR BLD AUTO: 0.7 %
BILIRUB SERPL-MCNC: 0.3 MG/DL
BILIRUBIN URINE: ABNORMAL
BLOOD URINE: NEGATIVE
BUN SERPL-MCNC: 35 MG/DL
CALCIUM SERPL-MCNC: 9.5 MG/DL
CHLORIDE SERPL-SCNC: 102 MMOL/L
CHOLEST SERPL-MCNC: 144 MG/DL
CO2 SERPL-SCNC: 20 MMOL/L
COLOR: YELLOW
CREAT SERPL-MCNC: 1.68 MG/DL
CRP SERPL-MCNC: <3 MG/L
EOSINOPHIL # BLD AUTO: 0.15 K/UL
EOSINOPHIL NFR BLD AUTO: 2 %
GLUCOSE QUALITATIVE U: NEGATIVE
GLUCOSE SERPL-MCNC: 158 MG/DL
HCT VFR BLD CALC: 40.6 %
HDLC SERPL-MCNC: 57 MG/DL
HGB BLD-MCNC: 13 G/DL
HYALINE CASTS: 3 /LPF
IMM GRANULOCYTES NFR BLD AUTO: 0.4 %
KETONES URINE: NORMAL
LDLC SERPL CALC-MCNC: 70 MG/DL
LEUKOCYTE ESTERASE URINE: NEGATIVE
LYMPHOCYTES # BLD AUTO: 1.47 K/UL
LYMPHOCYTES NFR BLD AUTO: 19.4 %
MAN DIFF?: NORMAL
MCHC RBC-ENTMCNC: 29.8 PG
MCHC RBC-ENTMCNC: 32 GM/DL
MCV RBC AUTO: 93.1 FL
MICROSCOPIC-UA: NORMAL
MONOCYTES # BLD AUTO: 0.63 K/UL
MONOCYTES NFR BLD AUTO: 8.3 %
NEUTROPHILS # BLD AUTO: 5.24 K/UL
NEUTROPHILS NFR BLD AUTO: 69.2 %
NITRITE URINE: NEGATIVE
NONHDLC SERPL-MCNC: 88 MG/DL
PH URINE: 5.5
PLATELET # BLD AUTO: 206 K/UL
POTASSIUM SERPL-SCNC: 4.5 MMOL/L
PROT SERPL-MCNC: 6.7 G/DL
PROTEIN URINE: ABNORMAL
PSA SERPL-MCNC: 3.76 NG/ML
RBC # BLD: 4.36 M/UL
RBC # FLD: 15 %
RED BLOOD CELLS URINE: 3 /HPF
SODIUM SERPL-SCNC: 140 MMOL/L
SPECIFIC GRAVITY URINE: >=1.03
SQUAMOUS EPITHELIAL CELLS: 2 /HPF
T3 SERPL-MCNC: 118 NG/DL
T4 FREE SERPL-MCNC: 1.5 NG/DL
T4 SERPL-MCNC: 8.3 UG/DL
TRIGL SERPL-MCNC: 86 MG/DL
TSH SERPL-ACNC: 0.08 UIU/ML
URINE COMMENTS: NORMAL
URINE CYTOLOGY: NORMAL
UROBILINOGEN URINE: NORMAL
VIT B12 SERPL-MCNC: 1399 PG/ML
WBC # FLD AUTO: 7.57 K/UL
WHITE BLOOD CELLS URINE: 2 /HPF

## 2021-05-03 ENCOUNTER — APPOINTMENT (OUTPATIENT)
Dept: SPINE | Facility: CLINIC | Age: 71
End: 2021-05-03
Payer: MEDICAID

## 2021-05-03 VITALS
WEIGHT: 225 LBS | HEART RATE: 80 BPM | DIASTOLIC BLOOD PRESSURE: 80 MMHG | BODY MASS INDEX: 37.49 KG/M2 | SYSTOLIC BLOOD PRESSURE: 166 MMHG | TEMPERATURE: 98.32 F | OXYGEN SATURATION: 94 % | HEIGHT: 65 IN

## 2021-05-03 PROCEDURE — 99072 ADDL SUPL MATRL&STAF TM PHE: CPT

## 2021-05-03 PROCEDURE — 99212 OFFICE O/P EST SF 10 MIN: CPT

## 2021-05-03 NOTE — REASON FOR VISIT
No [Follow-Up: _____] : a [unfilled] follow-up visit [FreeTextEntry1] : 70 year old male s/p left rertroperitoneal exposure  L 4 L5 diskectomy and allograft interbody arthrodesisi L 4 L5 with fusion in May of 2016.   He was doing well over the years and had intermittent back pain .   But recently  the back pain is  not relieved with three tablets of Advil.  No leg radiculopathy is noted.  No weakness.  He does not have a MRI.

## 2021-05-03 NOTE — ASSESSMENT
[FreeTextEntry1] : Recurrent back pain and no leg radicular pain.   S/P left retroperitoneal lumbar fusion L 4 L5.  An MRI, CT, of lumbar spine will be ordered.  Follow up when  complete.

## 2021-05-12 ENCOUNTER — APPOINTMENT (OUTPATIENT)
Dept: OTOLARYNGOLOGY | Facility: CLINIC | Age: 71
End: 2021-05-12
Payer: MEDICARE

## 2021-05-12 ENCOUNTER — NON-APPOINTMENT (OUTPATIENT)
Age: 71
End: 2021-05-12

## 2021-05-12 ENCOUNTER — APPOINTMENT (OUTPATIENT)
Dept: INTERNAL MEDICINE | Facility: CLINIC | Age: 71
End: 2021-05-12
Payer: MEDICAID

## 2021-05-12 VITALS
DIASTOLIC BLOOD PRESSURE: 76 MMHG | SYSTOLIC BLOOD PRESSURE: 158 MMHG | OXYGEN SATURATION: 97 % | RESPIRATION RATE: 14 BRPM | BODY MASS INDEX: 35.99 KG/M2 | WEIGHT: 216 LBS | HEIGHT: 65 IN | TEMPERATURE: 98 F | HEART RATE: 93 BPM

## 2021-05-12 VITALS
BODY MASS INDEX: 37.49 KG/M2 | WEIGHT: 225 LBS | TEMPERATURE: 98.6 F | HEART RATE: 90 BPM | HEIGHT: 65 IN | SYSTOLIC BLOOD PRESSURE: 144 MMHG | DIASTOLIC BLOOD PRESSURE: 75 MMHG

## 2021-05-12 PROCEDURE — 99214 OFFICE O/P EST MOD 30 MIN: CPT | Mod: 25

## 2021-05-12 PROCEDURE — 93000 ELECTROCARDIOGRAM COMPLETE: CPT

## 2021-05-12 PROCEDURE — 99072 ADDL SUPL MATRL&STAF TM PHE: CPT

## 2021-05-12 PROCEDURE — 31575 DIAGNOSTIC LARYNGOSCOPY: CPT

## 2021-05-14 NOTE — PROCEDURE
[Image(s) Captured] : image(s) captured and filed [Unable to Cooperate with Mirror] : patient unable to cooperate with mirror [Hoarseness] : hoarseness not clearly evaluated by indirect laryngoscopy [None] : none [Flexible Endoscope] : examined with the flexible endoscope [de-identified] : Flexible fiberoptic laryngoscope advanced orally and nasally, no naso- or melissa-pharyngeal lesions or masses identified, larynx visualized, adequate and symmetric cord adduction and abduction noted, no vocal cord masses/lesions/leukoplakia/nodules.\par  [de-identified] : surveillance of glottic cancer

## 2021-05-14 NOTE — HISTORY OF PRESENT ILLNESS
[de-identified] : 69 y/o male with history of glottic cancer treated with primary radiation in 2010, developed some hoarseness and was noted to have TVC lesion, benign on 1/17/17 excision. Hx heartburn and GERD and he has been on dietary/lifestyle modification & omeprazole which helps to relieve his symptoms. \par \par  [FreeTextEntry1] : He returns for follow-up today.  Voice stable.  Denies dysphagia, odynophagia, dyspnea, weight loss, fever, chills, night sweats, pneumonia, neck mass, BRENDA, hemoptysis, or pain.  He is scheduled for a left partial nephrectomy for a 3.2 cm renal cortical lesion in June.

## 2021-05-14 NOTE — ASSESSMENT
[FreeTextEntry1] : 70M with hx glottic cancer treated by primary RT in 2010 with no evidence of glottic disease, SAMIRA today.\par \par Plan:\par - Continue LPR/GERD lifestyle modification and PPI.\par - F/u 6 months.\par - RTC sooner should any worrisome symptoms develop.\par - Pt verbalized understanding of above. \par \par

## 2021-06-03 ENCOUNTER — APPOINTMENT (OUTPATIENT)
Dept: INTERNAL MEDICINE | Facility: CLINIC | Age: 71
End: 2021-06-03

## 2021-06-03 ENCOUNTER — APPOINTMENT (OUTPATIENT)
Dept: UROLOGY | Facility: CLINIC | Age: 71
End: 2021-06-03
Payer: MEDICARE

## 2021-06-03 VITALS
HEART RATE: 90 BPM | OXYGEN SATURATION: 98 % | DIASTOLIC BLOOD PRESSURE: 75 MMHG | SYSTOLIC BLOOD PRESSURE: 149 MMHG | TEMPERATURE: 98.2 F

## 2021-06-03 PROCEDURE — 99214 OFFICE O/P EST MOD 30 MIN: CPT

## 2021-06-03 NOTE — HISTORY OF PRESENT ILLNESS
[None] : None [FreeTextEntry1] : 71M here for left renal mass\par \par HPI: Patient was being monitored for renal cysts. Asymptomatic from  perspectived. Recent imaging found to have a solid enhancing renal mass. 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 \par \par

## 2021-06-03 NOTE — ASSESSMENT
[FreeTextEntry1] : 71M with left renal mass\par \par Ex-smoker; no symptoms. \par CT - 3.2 x 3.2cm enhancing mass, exophytic\par \par Discussed natural history, treatment options, risks and benefits of left partial nephrectomy\par \par Plan - \par Left partial nephrectomy\par Medical and cardiac clearance \par \par We reviewed the possible underlying histology of solid enhancing renal masses, with the majority being malignant (~80%) whereas ~20% are benign (e.g. oncocytoma).  We discussed the role/possibility of percutaneous biopsy, with the associated risks, benefits, complications, and accuracy issues (e.g. risk of false negative results). \par \par The heterogeneous natural history/biology of renal cell carcinoma was discussed, including the fact that while many renal cancers are indolent, others behave aggresssively.  \par \par Options were reviewed including, not limited to, active surveillance (AS), surgical extirpation and ablation.  The risks of tumor growth and metastasis on active surveillance were reviewed, including the fact that metastatic progression on AS could mean missing the opportunity for cure.  The average growth rate of ~2-3 mm/year and metastasis rates of ~2-3% on AS over 5 year interval for small renal masses <4 cm was discussed. \par \par With respect to treatment we reviewed ablation (cryosurgery, radiofrequency ablation), risks of recurrence, opportunities for salvage treatment, and imaging requirements for follow up.  Oncologic outcomes for ablation were reviewed. \par \par With respect to surgery we reviewed nephron sparing surgery vs. radical nephrectomy, as well as open vs. minimally invasive surgical (MIS) approaches (e.g. laparoscopy and robotic assisted laparoscopic surgery).  Personal and institutional experience, as well as other published literature was reviewed.  Oncologic outcomes, renal functional outcomes were compared and contrasted between radical vs. NSS and open vs. MIS surgery.  Risks of acute kidney injury, medical/surgical chronic kidney disease (either exacerbation or new-onset), as well as risks of ESRD development were reviewed.   Risks of conversion from MIS to open surgery discussed.  Risks of converting from attempted partial nephrectomy to radical nephrectomy were discussed.  Risks of surgical complications were reviewed, including not limited to: bleeding//life-threatening hemorrhage, vascular/bowel/adjacent visceral organ injury, trocar/access injury, the possibility of recognized vs. unrecognized/delayed-recognition injury, risks of thermal/blunt/sharp/retraction injury, risks of DVT, PE, MI, death, risks of cardiopulmonary/anesthesia related complications, positional injury, infection/collection/abscess, wound complications/dehiscence/seroma/cellulitis, urinoma/fistula, ureteral injury/obstruction, as well as other complications\par

## 2021-06-03 NOTE — PHYSICAL EXAM
[General Appearance - Well Developed] : well developed [General Appearance - Well Nourished] : well nourished [Normal Appearance] : normal appearance [Well Groomed] : well groomed [General Appearance - In No Acute Distress] : no acute distress [Abdomen Soft] : soft [Abdomen Tenderness] : non-tender [Costovertebral Angle Tenderness] : no ~M costovertebral angle tenderness [Edema] : no peripheral edema [] : no respiratory distress [Respiration, Rhythm And Depth] : normal respiratory rhythm and effort [Exaggerated Use Of Accessory Muscles For Inspiration] : no accessory muscle use [Urethral Meatus] : meatus normal [Urinary Bladder Findings] : the bladder was normal on palpation [Scrotum] : the scrotum was normal [Testes Mass (___cm)] : there were no testicular masses [Normal Station and Gait] : the gait and station were normal for the patient's age [No Focal Deficits] : no focal deficits [Oriented To Time, Place, And Person] : oriented to person, place, and time [Affect] : the affect was normal [Mood] : the mood was normal [Not Anxious] : not anxious [No Palpable Adenopathy] : no palpable adenopathy

## 2021-06-04 ENCOUNTER — APPOINTMENT (OUTPATIENT)
Dept: INTERNAL MEDICINE | Facility: CLINIC | Age: 71
End: 2021-06-04

## 2021-06-06 LAB
ALBUMIN SERPL ELPH-MCNC: 4.3 G/DL
ALP BLD-CCNC: 102 U/L
ALT SERPL-CCNC: 18 U/L
ANION GAP SERPL CALC-SCNC: 13 MMOL/L
APPEARANCE: CLEAR
APTT BLD: 29.6 SEC
AST SERPL-CCNC: 22 U/L
BACTERIA UR CULT: NORMAL
BACTERIA: NEGATIVE
BASOPHILS # BLD AUTO: 0.09 K/UL
BASOPHILS NFR BLD AUTO: 1 %
BILIRUB SERPL-MCNC: 0.4 MG/DL
BILIRUBIN URINE: NEGATIVE
BLOOD URINE: NEGATIVE
BUN SERPL-MCNC: 40 MG/DL
CALCIUM SERPL-MCNC: 9.3 MG/DL
CHLORIDE SERPL-SCNC: 102 MMOL/L
CHOLEST SERPL-MCNC: 128 MG/DL
CO2 SERPL-SCNC: 22 MMOL/L
COLOR: YELLOW
CREAT SERPL-MCNC: 1.99 MG/DL
EOSINOPHIL # BLD AUTO: 0.6 K/UL
EOSINOPHIL NFR BLD AUTO: 6.5 %
ESTIMATED AVERAGE GLUCOSE: 166 MG/DL
GLUCOSE QUALITATIVE U: NEGATIVE
GLUCOSE SERPL-MCNC: 143 MG/DL
HBA1C MFR BLD HPLC: 7.4 %
HCT VFR BLD CALC: 38.1 %
HDLC SERPL-MCNC: 42 MG/DL
HGB BLD-MCNC: 12.6 G/DL
HYALINE CASTS: 0 /LPF
IMM GRANULOCYTES NFR BLD AUTO: 0.3 %
INR PPP: 1 RATIO
KETONES URINE: NEGATIVE
LDLC SERPL CALC-MCNC: 40 MG/DL
LEUKOCYTE ESTERASE URINE: NEGATIVE
LYMPHOCYTES # BLD AUTO: 2.04 K/UL
LYMPHOCYTES NFR BLD AUTO: 22 %
MAN DIFF?: NORMAL
MCHC RBC-ENTMCNC: 29.9 PG
MCHC RBC-ENTMCNC: 33.1 GM/DL
MCV RBC AUTO: 90.3 FL
MICROSCOPIC-UA: NORMAL
MONOCYTES # BLD AUTO: 0.74 K/UL
MONOCYTES NFR BLD AUTO: 8 %
NEUTROPHILS # BLD AUTO: 5.78 K/UL
NEUTROPHILS NFR BLD AUTO: 62.2 %
NITRITE URINE: NEGATIVE
NONHDLC SERPL-MCNC: 87 MG/DL
PH URINE: 5.5
PLATELET # BLD AUTO: 219 K/UL
POTASSIUM SERPL-SCNC: 4.5 MMOL/L
PROT SERPL-MCNC: 6.9 G/DL
PROTEIN URINE: ABNORMAL
PT BLD: 11.9 SEC
RBC # BLD: 4.22 M/UL
RBC # FLD: 14.5 %
RED BLOOD CELLS URINE: 1 /HPF
SODIUM SERPL-SCNC: 137 MMOL/L
SPECIFIC GRAVITY URINE: 1.03
SQUAMOUS EPITHELIAL CELLS: 2 /HPF
T3 SERPL-MCNC: 109 NG/DL
T4 FREE SERPL-MCNC: 1.7 NG/DL
T4 SERPL-MCNC: 8.4 UG/DL
TRIGL SERPL-MCNC: 231 MG/DL
TSH SERPL-ACNC: 0.07 UIU/ML
URINE CYTOLOGY: NORMAL
UROBILINOGEN URINE: ABNORMAL
WBC # FLD AUTO: 9.28 K/UL
WHITE BLOOD CELLS URINE: 1 /HPF

## 2021-06-11 ENCOUNTER — NON-APPOINTMENT (OUTPATIENT)
Age: 71
End: 2021-06-11

## 2021-06-11 ENCOUNTER — APPOINTMENT (OUTPATIENT)
Dept: INTERNAL MEDICINE | Facility: CLINIC | Age: 71
End: 2021-06-11
Payer: MEDICARE

## 2021-06-11 VITALS
SYSTOLIC BLOOD PRESSURE: 152 MMHG | OXYGEN SATURATION: 97 % | HEART RATE: 94 BPM | HEIGHT: 65 IN | RESPIRATION RATE: 14 BRPM | DIASTOLIC BLOOD PRESSURE: 69 MMHG | BODY MASS INDEX: 36.32 KG/M2 | WEIGHT: 218 LBS | TEMPERATURE: 97.9 F

## 2021-06-11 PROCEDURE — 99214 OFFICE O/P EST MOD 30 MIN: CPT | Mod: 25

## 2021-06-11 PROCEDURE — 93000 ELECTROCARDIOGRAM COMPLETE: CPT

## 2021-06-11 NOTE — HISTORY OF PRESENT ILLNESS
[Spouse] : spouse [FreeTextEntry1] : Mass Excision Left Kidney [FreeTextEntry3] : Dr. 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

## 2021-06-11 NOTE — PLAN
[FreeTextEntry1] : Blood tests and urine sent to the lab\par \par PT INR\par \par EKG\par \par Chest X-ray\par \par Cardiology evaluation\par

## 2021-06-11 NOTE — ASSESSMENT
[FreeTextEntry4] : Notified that patient's surgery has been postponed to later date with another urologist, Dr. Chan. Patient will schedule appointment for clearance prior to next surgery.

## 2021-06-21 ENCOUNTER — APPOINTMENT (OUTPATIENT)
Dept: HEART AND VASCULAR | Facility: CLINIC | Age: 71
End: 2021-06-21

## 2021-06-21 ENCOUNTER — APPOINTMENT (OUTPATIENT)
Dept: SPINE | Facility: CLINIC | Age: 71
End: 2021-06-21
Payer: MEDICARE

## 2021-06-21 VITALS
DIASTOLIC BLOOD PRESSURE: 73 MMHG | BODY MASS INDEX: 36.32 KG/M2 | HEIGHT: 65 IN | HEART RATE: 81 BPM | SYSTOLIC BLOOD PRESSURE: 132 MMHG | WEIGHT: 218 LBS | TEMPERATURE: 97.9 F | OXYGEN SATURATION: 94 %

## 2021-06-21 DIAGNOSIS — Z87.09 PERSONAL HISTORY OF OTHER DISEASES OF THE RESPIRATORY SYSTEM: ICD-10-CM

## 2021-06-21 DIAGNOSIS — Z98.890 OTHER SPECIFIED POSTPROCEDURAL STATES: ICD-10-CM

## 2021-06-21 LAB
ALBUMIN SERPL ELPH-MCNC: 4.1 G/DL
ALP BLD-CCNC: 101 U/L
ALT SERPL-CCNC: 17 U/L
ANION GAP SERPL CALC-SCNC: 13 MMOL/L
APPEARANCE: CLEAR
AST SERPL-CCNC: 21 U/L
BACTERIA UR CULT: NORMAL
BACTERIA: NEGATIVE
BASOPHILS # BLD AUTO: 0.11 K/UL
BASOPHILS NFR BLD AUTO: 1.2 %
BILIRUB SERPL-MCNC: 0.5 MG/DL
BILIRUBIN URINE: NEGATIVE
BLOOD URINE: NEGATIVE
BUN SERPL-MCNC: 29 MG/DL
CALCIUM SERPL-MCNC: 9.6 MG/DL
CHLORIDE SERPL-SCNC: 102 MMOL/L
CHOLEST SERPL-MCNC: 121 MG/DL
CO2 SERPL-SCNC: 24 MMOL/L
COLOR: NORMAL
CREAT SERPL-MCNC: 1.59 MG/DL
EOSINOPHIL # BLD AUTO: 1.52 K/UL
EOSINOPHIL NFR BLD AUTO: 16.1 %
GLUCOSE QUALITATIVE U: ABNORMAL
GLUCOSE SERPL-MCNC: 165 MG/DL
HCT VFR BLD CALC: 41 %
HDLC SERPL-MCNC: 40 MG/DL
HGB BLD-MCNC: 12.8 G/DL
HYALINE CASTS: 0 /LPF
IMM GRANULOCYTES NFR BLD AUTO: 0.1 %
INR PPP: 0.99 RATIO
KETONES URINE: NEGATIVE
LDLC SERPL CALC-MCNC: 45 MG/DL
LEUKOCYTE ESTERASE URINE: NEGATIVE
LYMPHOCYTES # BLD AUTO: 1.92 K/UL
LYMPHOCYTES NFR BLD AUTO: 20.4 %
MAN DIFF?: NORMAL
MCHC RBC-ENTMCNC: 29.1 PG
MCHC RBC-ENTMCNC: 31.2 GM/DL
MCV RBC AUTO: 93.2 FL
MICROSCOPIC-UA: NORMAL
MONOCYTES # BLD AUTO: 0.68 K/UL
MONOCYTES NFR BLD AUTO: 7.2 %
NEUTROPHILS # BLD AUTO: 5.19 K/UL
NEUTROPHILS NFR BLD AUTO: 55 %
NITRITE URINE: NEGATIVE
NONHDLC SERPL-MCNC: 81 MG/DL
PH URINE: 5.5
PLATELET # BLD AUTO: 212 K/UL
POTASSIUM SERPL-SCNC: 5 MMOL/L
PROT SERPL-MCNC: 6.5 G/DL
PROTEIN URINE: ABNORMAL
PT BLD: 11.7 SEC
RBC # BLD: 4.4 M/UL
RBC # FLD: 15.2 %
RED BLOOD CELLS URINE: 0 /HPF
SODIUM SERPL-SCNC: 139 MMOL/L
SPECIFIC GRAVITY URINE: 1.01
SQUAMOUS EPITHELIAL CELLS: 1 /HPF
T3 SERPL-MCNC: 123 NG/DL
T4 FREE SERPL-MCNC: 2.2 NG/DL
T4 SERPL-MCNC: 10.3 UG/DL
TRIGL SERPL-MCNC: 179 MG/DL
TSH SERPL-ACNC: 0.01 UIU/ML
UROBILINOGEN URINE: NORMAL
WBC # FLD AUTO: 9.43 K/UL
WHITE BLOOD CELLS URINE: 3 /HPF

## 2021-06-21 PROCEDURE — ZZZZZ: CPT

## 2021-06-21 PROCEDURE — 99213 OFFICE O/P EST LOW 20 MIN: CPT

## 2021-06-21 NOTE — END OF VISIT
[FreeTextEntry3] : I, Elke Coe, personally transcribed these services in the presence of Dr. Steven Diaz MD. I, Dr. Steven Diaz MD, personally performed the services described in this documentation as scribed by Elke Coe in my presence and it is both accurate and complete.

## 2021-06-21 NOTE — DATA REVIEWED
[de-identified] : CT scan of the loumbar region 6/1/2021 [place] [facility] [de-identified] : Lumbar MRI from [facility]  6/1/2021 [place] [de-identified] : Lumbar  xrays form [place] 6/1/2021

## 2021-06-21 NOTE — HISTORY OF PRESENT ILLNESS
[Spouse] : spouse [Coronary Artery Disease] : coronary artery disease [No Pertinent Pulmonary History] : no history of asthma, COPD, sleep apnea, or smoking [No Adverse Anesthesia Reaction] : no adverse anesthesia reaction in self or family member [Chronic Anticoagulation] : chronic anticoagulation [Chronic Kidney Disease] : chronic kidney disease [Diabetes] : diabetes [(Patient denies any chest pain, claudication, dyspnea on exertion, orthopnea, palpitations or syncope)] : Patient denies any chest pain, claudication, dyspnea on exertion, orthopnea, palpitations or syncope [Aortic Stenosis] : no aortic stenosis [Atrial Fibrillation] : no atrial fibrillation [Recent Myocardial Infarction] : no recent myocardial infarction [Implantable Device/Pacemaker] : no implantable device/pacemaker [FreeTextEntry1] : Partial nephrectomy [FreeTextEntry2] : 06/23/2021 [FreeTextEntry3] : Dr. Elliott Chan [FreeTextEntry4] : HERMANN JAVIER is a 71 year old male with a PMHx of anxiety and depression, BPH, CAD, kidney CA, CHF, dementia, DM, DVT of leg, laryngeal CA, HLD, HTN, lumbar radiculopathy, and thyroid disease who presents today for pre-operative evaluation.

## 2021-06-21 NOTE — REASON FOR VISIT
[Follow-Up: _____] : a [unfilled] follow-up visit [Family Member] : family member [FreeTextEntry1] : \par Mr Brunson is seen today in follow up  for buttock pain and no leg pain  Five years ago he had undergone a  left retro peritoneal exposure L 4 L 5 fusion.  He is having mild  buttock pain that is tolerable with Motrin. . Flexion extension x-rays did not show any dynamic instability. A CT scan shows a solid arthrodesis at L4-5. MRI scanning shows stenosis at L2-3 and L3-4. He is forced to walk in a stooped fashion in order to minimize his pain. If he tries to stand up straight he developed significant pain. He has not had any physical therapy.

## 2021-06-21 NOTE — ASSESSMENT
[FreeTextEntry1] : \par \par 71 year old male now five years postop left retroperitoneal fusion.  MRI and CT shows  solid fusion and DJD above the prior fusion.   It is moderate stenosis and surgery may be needed if nonsurgical modalities fail.   He was encouraged to attend PT and do daily exercises.  Return in six weeks for follow up.

## 2021-06-21 NOTE — ASSESSMENT
[No Further Testing Recommended] : no further testing recommended [As per surgery] : as per surgery [FreeTextEntry4] : The patient is a 71 year-old man who presents for evaluation prior to his scheduled partial nephrectomy. The patient's lab work, EKG, and chest x-ray have been reviewed and found to be in acceptable limits for surgery. Please see consult note from Dr. Malachi Allan for cardiac clearance. The patient is now medically cleared for surgery with no absolute medical contraindications.

## 2021-06-22 ENCOUNTER — NON-APPOINTMENT (OUTPATIENT)
Age: 71
End: 2021-06-22

## 2021-06-22 ENCOUNTER — TRANSCRIPTION ENCOUNTER (OUTPATIENT)
Age: 71
End: 2021-06-22

## 2021-06-22 VITALS
HEART RATE: 92 BPM | DIASTOLIC BLOOD PRESSURE: 74 MMHG | OXYGEN SATURATION: 96 % | WEIGHT: 216.05 LBS | SYSTOLIC BLOOD PRESSURE: 153 MMHG | RESPIRATION RATE: 17 BRPM | HEIGHT: 65 IN | TEMPERATURE: 98 F

## 2021-06-23 ENCOUNTER — RESULT REVIEW (OUTPATIENT)
Age: 71
End: 2021-06-23

## 2021-06-23 ENCOUNTER — INPATIENT (INPATIENT)
Facility: HOSPITAL | Age: 71
LOS: 0 days | Discharge: HOME | DRG: 658 | End: 2021-06-24
Attending: UROLOGY | Admitting: UROLOGY
Payer: MEDICARE

## 2021-06-23 ENCOUNTER — APPOINTMENT (OUTPATIENT)
Dept: UROLOGY | Facility: HOSPITAL | Age: 71
End: 2021-06-23

## 2021-06-23 DIAGNOSIS — N28.89 OTHER SPECIFIED DISORDERS OF KIDNEY AND URETER: ICD-10-CM

## 2021-06-23 DIAGNOSIS — I25.10 ATHEROSCLEROTIC HEART DISEASE OF NATIVE CORONARY ARTERY WITHOUT ANGINA PECTORIS: ICD-10-CM

## 2021-06-23 DIAGNOSIS — K21.9 GASTRO-ESOPHAGEAL REFLUX DISEASE WITHOUT ESOPHAGITIS: ICD-10-CM

## 2021-06-23 DIAGNOSIS — E11.9 TYPE 2 DIABETES MELLITUS WITHOUT COMPLICATIONS: ICD-10-CM

## 2021-06-23 DIAGNOSIS — Z98.89 OTHER SPECIFIED POSTPROCEDURAL STATES: Chronic | ICD-10-CM

## 2021-06-23 DIAGNOSIS — I10 ESSENTIAL (PRIMARY) HYPERTENSION: ICD-10-CM

## 2021-06-23 DIAGNOSIS — Z95.5 PRESENCE OF CORONARY ANGIOPLASTY IMPLANT AND GRAFT: Chronic | ICD-10-CM

## 2021-06-23 DIAGNOSIS — Z96.652 PRESENCE OF LEFT ARTIFICIAL KNEE JOINT: Chronic | ICD-10-CM

## 2021-06-23 DIAGNOSIS — N40.0 BENIGN PROSTATIC HYPERPLASIA WITHOUT LOWER URINARY TRACT SYMPTOMS: ICD-10-CM

## 2021-06-23 DIAGNOSIS — Z95.5 PRESENCE OF CORONARY ANGIOPLASTY IMPLANT AND GRAFT: ICD-10-CM

## 2021-06-23 DIAGNOSIS — C64.2 MALIGNANT NEOPLASM OF LEFT KIDNEY, EXCEPT RENAL PELVIS: ICD-10-CM

## 2021-06-23 LAB
ANION GAP SERPL CALC-SCNC: 13 MMOL/L — SIGNIFICANT CHANGE UP (ref 5–17)
BUN SERPL-MCNC: 38 MG/DL — HIGH (ref 7–23)
CALCIUM SERPL-MCNC: 9 MG/DL — SIGNIFICANT CHANGE UP (ref 8.4–10.5)
CHLORIDE SERPL-SCNC: 110 MMOL/L — HIGH (ref 96–108)
CO2 SERPL-SCNC: 22 MMOL/L — SIGNIFICANT CHANGE UP (ref 22–31)
CREAT SERPL-MCNC: 1.78 MG/DL — HIGH (ref 0.5–1.3)
GLUCOSE BLDC GLUCOMTR-MCNC: 122 MG/DL — HIGH (ref 70–99)
GLUCOSE BLDC GLUCOMTR-MCNC: 144 MG/DL — HIGH (ref 70–99)
GLUCOSE BLDC GLUCOMTR-MCNC: 206 MG/DL — HIGH (ref 70–99)
GLUCOSE SERPL-MCNC: 224 MG/DL — HIGH (ref 70–99)
HCT VFR BLD CALC: 32.1 % — LOW (ref 39–50)
HCT VFR BLD CALC: 34.8 % — LOW (ref 39–50)
HGB BLD-MCNC: 10.3 G/DL — LOW (ref 13–17)
HGB BLD-MCNC: 11.3 G/DL — LOW (ref 13–17)
MAGNESIUM SERPL-MCNC: 1.8 MG/DL — SIGNIFICANT CHANGE UP (ref 1.6–2.6)
MCHC RBC-ENTMCNC: 29.6 PG — SIGNIFICANT CHANGE UP (ref 27–34)
MCHC RBC-ENTMCNC: 29.6 PG — SIGNIFICANT CHANGE UP (ref 27–34)
MCHC RBC-ENTMCNC: 32.1 GM/DL — SIGNIFICANT CHANGE UP (ref 32–36)
MCHC RBC-ENTMCNC: 32.5 GM/DL — SIGNIFICANT CHANGE UP (ref 32–36)
MCV RBC AUTO: 91.1 FL — SIGNIFICANT CHANGE UP (ref 80–100)
MCV RBC AUTO: 92.2 FL — SIGNIFICANT CHANGE UP (ref 80–100)
NRBC # BLD: 0 /100 WBCS — SIGNIFICANT CHANGE UP (ref 0–0)
NRBC # BLD: 0 /100 WBCS — SIGNIFICANT CHANGE UP (ref 0–0)
PHOSPHATE SERPL-MCNC: 4.4 MG/DL — SIGNIFICANT CHANGE UP (ref 2.5–4.5)
PLATELET # BLD AUTO: 168 K/UL — SIGNIFICANT CHANGE UP (ref 150–400)
PLATELET # BLD AUTO: 173 K/UL — SIGNIFICANT CHANGE UP (ref 150–400)
POTASSIUM SERPL-MCNC: 4.9 MMOL/L — SIGNIFICANT CHANGE UP (ref 3.5–5.3)
POTASSIUM SERPL-SCNC: 4.9 MMOL/L — SIGNIFICANT CHANGE UP (ref 3.5–5.3)
RBC # BLD: 3.48 M/UL — LOW (ref 4.2–5.8)
RBC # BLD: 3.82 M/UL — LOW (ref 4.2–5.8)
RBC # FLD: 14.7 % — HIGH (ref 10.3–14.5)
RBC # FLD: 14.7 % — HIGH (ref 10.3–14.5)
SODIUM SERPL-SCNC: 145 MMOL/L — SIGNIFICANT CHANGE UP (ref 135–145)
WBC # BLD: 11.48 K/UL — HIGH (ref 3.8–10.5)
WBC # BLD: 12.32 K/UL — HIGH (ref 3.8–10.5)
WBC # FLD AUTO: 11.48 K/UL — HIGH (ref 3.8–10.5)
WBC # FLD AUTO: 12.32 K/UL — HIGH (ref 3.8–10.5)

## 2021-06-23 PROCEDURE — 88307 TISSUE EXAM BY PATHOLOGIST: CPT | Mod: 26

## 2021-06-23 PROCEDURE — 76998 US GUIDE INTRAOP: CPT | Mod: 26,LT

## 2021-06-23 PROCEDURE — S2900 ROBOTIC SURGICAL SYSTEM: CPT | Mod: LT

## 2021-06-23 PROCEDURE — 50543 LAPARO PARTIAL NEPHRECTOMY: CPT | Mod: LT

## 2021-06-23 RX ORDER — HEPARIN SODIUM 5000 [USP'U]/ML
7500 INJECTION INTRAVENOUS; SUBCUTANEOUS EVERY 8 HOURS
Refills: 0 | Status: DISCONTINUED | OUTPATIENT
Start: 2021-06-23 | End: 2021-06-24

## 2021-06-23 RX ORDER — DEXTROSE 50 % IN WATER 50 %
15 SYRINGE (ML) INTRAVENOUS ONCE
Refills: 0 | Status: DISCONTINUED | OUTPATIENT
Start: 2021-06-23 | End: 2021-06-24

## 2021-06-23 RX ORDER — SODIUM CHLORIDE 9 MG/ML
500 INJECTION, SOLUTION INTRAVENOUS ONCE
Refills: 0 | Status: COMPLETED | OUTPATIENT
Start: 2021-06-23 | End: 2021-06-23

## 2021-06-23 RX ORDER — GLUCAGON INJECTION, SOLUTION 0.5 MG/.1ML
1 INJECTION, SOLUTION SUBCUTANEOUS ONCE
Refills: 0 | Status: DISCONTINUED | OUTPATIENT
Start: 2021-06-23 | End: 2021-06-24

## 2021-06-23 RX ORDER — OXYCODONE HYDROCHLORIDE 5 MG/1
10 TABLET ORAL EVERY 4 HOURS
Refills: 0 | Status: DISCONTINUED | OUTPATIENT
Start: 2021-06-23 | End: 2021-06-24

## 2021-06-23 RX ORDER — SODIUM CHLORIDE 9 MG/ML
1000 INJECTION, SOLUTION INTRAVENOUS
Refills: 0 | Status: DISCONTINUED | OUTPATIENT
Start: 2021-06-23 | End: 2021-06-24

## 2021-06-23 RX ORDER — GABAPENTIN 400 MG/1
300 CAPSULE ORAL ONCE
Refills: 0 | Status: COMPLETED | OUTPATIENT
Start: 2021-06-23 | End: 2021-06-23

## 2021-06-23 RX ORDER — DEXTROSE 50 % IN WATER 50 %
25 SYRINGE (ML) INTRAVENOUS ONCE
Refills: 0 | Status: DISCONTINUED | OUTPATIENT
Start: 2021-06-23 | End: 2021-06-24

## 2021-06-23 RX ORDER — CEFAZOLIN SODIUM 1 G
2000 VIAL (EA) INJECTION EVERY 8 HOURS
Refills: 0 | Status: DISCONTINUED | OUTPATIENT
Start: 2021-06-23 | End: 2021-06-23

## 2021-06-23 RX ORDER — ACETAMINOPHEN 500 MG
650 TABLET ORAL EVERY 6 HOURS
Refills: 0 | Status: DISCONTINUED | OUTPATIENT
Start: 2021-06-23 | End: 2021-06-23

## 2021-06-23 RX ORDER — ACETAMINOPHEN 500 MG
650 TABLET ORAL ONCE
Refills: 0 | Status: COMPLETED | OUTPATIENT
Start: 2021-06-23 | End: 2021-06-23

## 2021-06-23 RX ORDER — HYDROMORPHONE HYDROCHLORIDE 2 MG/ML
0.5 INJECTION INTRAMUSCULAR; INTRAVENOUS; SUBCUTANEOUS ONCE
Refills: 0 | Status: DISCONTINUED | OUTPATIENT
Start: 2021-06-23 | End: 2021-06-23

## 2021-06-23 RX ORDER — OXYBUTYNIN CHLORIDE 5 MG
5 TABLET ORAL EVERY 8 HOURS
Refills: 0 | Status: DISCONTINUED | OUTPATIENT
Start: 2021-06-23 | End: 2021-06-24

## 2021-06-23 RX ORDER — ENOXAPARIN SODIUM 100 MG/ML
40 INJECTION SUBCUTANEOUS ONCE
Refills: 0 | Status: COMPLETED | OUTPATIENT
Start: 2021-06-23 | End: 2021-06-23

## 2021-06-23 RX ORDER — AMLODIPINE BESYLATE 2.5 MG/1
10 TABLET ORAL DAILY
Refills: 0 | Status: DISCONTINUED | OUTPATIENT
Start: 2021-06-23 | End: 2021-06-24

## 2021-06-23 RX ORDER — CEFAZOLIN SODIUM 1 G
2000 VIAL (EA) INJECTION EVERY 8 HOURS
Refills: 0 | Status: DISCONTINUED | OUTPATIENT
Start: 2021-06-23 | End: 2021-06-24

## 2021-06-23 RX ORDER — INSULIN LISPRO 100/ML
VIAL (ML) SUBCUTANEOUS
Refills: 0 | Status: DISCONTINUED | OUTPATIENT
Start: 2021-06-23 | End: 2021-06-24

## 2021-06-23 RX ORDER — LOSARTAN POTASSIUM 100 MG/1
100 TABLET, FILM COATED ORAL DAILY
Refills: 0 | Status: DISCONTINUED | OUTPATIENT
Start: 2021-06-23 | End: 2021-06-24

## 2021-06-23 RX ORDER — ATORVASTATIN CALCIUM 80 MG/1
80 TABLET, FILM COATED ORAL AT BEDTIME
Refills: 0 | Status: DISCONTINUED | OUTPATIENT
Start: 2021-06-23 | End: 2021-06-24

## 2021-06-23 RX ORDER — VENLAFAXINE HCL 75 MG
150 CAPSULE, EXT RELEASE 24 HR ORAL DAILY
Refills: 0 | Status: DISCONTINUED | OUTPATIENT
Start: 2021-06-23 | End: 2021-06-24

## 2021-06-23 RX ORDER — PANTOPRAZOLE SODIUM 20 MG/1
40 TABLET, DELAYED RELEASE ORAL
Refills: 0 | Status: DISCONTINUED | OUTPATIENT
Start: 2021-06-23 | End: 2021-06-24

## 2021-06-23 RX ORDER — ACETAMINOPHEN 500 MG
650 TABLET ORAL EVERY 6 HOURS
Refills: 0 | Status: DISCONTINUED | OUTPATIENT
Start: 2021-06-23 | End: 2021-06-24

## 2021-06-23 RX ORDER — OXYCODONE HYDROCHLORIDE 5 MG/1
5 TABLET ORAL EVERY 4 HOURS
Refills: 0 | Status: DISCONTINUED | OUTPATIENT
Start: 2021-06-23 | End: 2021-06-24

## 2021-06-23 RX ORDER — ALBUMIN HUMAN 25 %
250 VIAL (ML) INTRAVENOUS ONCE
Refills: 0 | Status: COMPLETED | OUTPATIENT
Start: 2021-06-23 | End: 2021-06-23

## 2021-06-23 RX ORDER — SENNA PLUS 8.6 MG/1
2 TABLET ORAL AT BEDTIME
Refills: 0 | Status: DISCONTINUED | OUTPATIENT
Start: 2021-06-23 | End: 2021-06-24

## 2021-06-23 RX ORDER — ALPRAZOLAM 0.25 MG
1 TABLET ORAL EVERY 12 HOURS
Refills: 0 | Status: DISCONTINUED | OUTPATIENT
Start: 2021-06-23 | End: 2021-06-24

## 2021-06-23 RX ORDER — ONDANSETRON 8 MG/1
4 TABLET, FILM COATED ORAL EVERY 8 HOURS
Refills: 0 | Status: DISCONTINUED | OUTPATIENT
Start: 2021-06-23 | End: 2021-06-24

## 2021-06-23 RX ORDER — TAMSULOSIN HYDROCHLORIDE 0.4 MG/1
0.4 CAPSULE ORAL AT BEDTIME
Refills: 0 | Status: DISCONTINUED | OUTPATIENT
Start: 2021-06-23 | End: 2021-06-24

## 2021-06-23 RX ORDER — DEXTROSE 50 % IN WATER 50 %
12.5 SYRINGE (ML) INTRAVENOUS ONCE
Refills: 0 | Status: DISCONTINUED | OUTPATIENT
Start: 2021-06-23 | End: 2021-06-24

## 2021-06-23 RX ORDER — SODIUM CHLORIDE 9 MG/ML
1000 INJECTION, SOLUTION INTRAVENOUS ONCE
Refills: 0 | Status: COMPLETED | OUTPATIENT
Start: 2021-06-23 | End: 2021-06-23

## 2021-06-23 RX ADMIN — Medication 2000 MILLIGRAM(S): at 20:26

## 2021-06-23 RX ADMIN — TAMSULOSIN HYDROCHLORIDE 0.4 MILLIGRAM(S): 0.4 CAPSULE ORAL at 21:36

## 2021-06-23 RX ADMIN — PANTOPRAZOLE SODIUM 40 MILLIGRAM(S): 20 TABLET, DELAYED RELEASE ORAL at 21:36

## 2021-06-23 RX ADMIN — HEPARIN SODIUM 7500 UNIT(S): 5000 INJECTION INTRAVENOUS; SUBCUTANEOUS at 21:35

## 2021-06-23 RX ADMIN — Medication 150 MILLIGRAM(S): at 21:36

## 2021-06-23 RX ADMIN — SODIUM CHLORIDE 1000 MILLILITER(S): 9 INJECTION, SOLUTION INTRAVENOUS at 15:55

## 2021-06-23 RX ADMIN — ENOXAPARIN SODIUM 40 MILLIGRAM(S): 100 INJECTION SUBCUTANEOUS at 10:06

## 2021-06-23 RX ADMIN — Medication 650 MILLIGRAM(S): at 18:10

## 2021-06-23 RX ADMIN — Medication 500 MILLILITER(S): at 17:13

## 2021-06-23 RX ADMIN — SODIUM CHLORIDE 120 MILLILITER(S): 9 INJECTION, SOLUTION INTRAVENOUS at 15:01

## 2021-06-23 RX ADMIN — Medication 5 MILLIGRAM(S): at 15:01

## 2021-06-23 RX ADMIN — SENNA PLUS 2 TABLET(S): 8.6 TABLET ORAL at 21:36

## 2021-06-23 RX ADMIN — Medication 4: at 16:00

## 2021-06-23 RX ADMIN — HYDROMORPHONE HYDROCHLORIDE 0.5 MILLIGRAM(S): 2 INJECTION INTRAMUSCULAR; INTRAVENOUS; SUBCUTANEOUS at 15:30

## 2021-06-23 RX ADMIN — Medication 650 MILLIGRAM(S): at 18:00

## 2021-06-23 RX ADMIN — Medication 650 MILLIGRAM(S): at 10:06

## 2021-06-23 RX ADMIN — HYDROMORPHONE HYDROCHLORIDE 0.5 MILLIGRAM(S): 2 INJECTION INTRAMUSCULAR; INTRAVENOUS; SUBCUTANEOUS at 15:00

## 2021-06-23 RX ADMIN — ATORVASTATIN CALCIUM 80 MILLIGRAM(S): 80 TABLET, FILM COATED ORAL at 21:36

## 2021-06-23 RX ADMIN — GABAPENTIN 300 MILLIGRAM(S): 400 CAPSULE ORAL at 10:06

## 2021-06-23 NOTE — PACU DISCHARGE NOTE - COMMENTS
Pt met criteria for dischag
Pt met criteria for discharge- hemodynamically stable after 1.5L and Albumin5%in 250 ml- making urine via Noriega- tolerated ice chips and water- expresses Pain relief s.p medication regimen- abdomen soft and non tender with 5 Dermabond lap sites- endorsed to LI Nayak-Pt left unit via bed on IVF and supplemental oxygen to 9614-1

## 2021-06-23 NOTE — PROGRESS NOTE ADULT - SUBJECTIVE AND OBJECTIVE BOX
UROLOGY POST OP NOTE (PAGER # [phone])    PROCEDURE: laparoscopic Left partial nephrectomy    T(C): 36.3 (06-23-21 @ 14:30), Max: 36.3 (06-23-21 @ 14:30)  HR: 72 (06-23-21 @ 16:50) (63 - 85)  BP: 77/42 (06-23-21 @ 16:50) (77/42 - 132/60)  RR: 14 (06-23-21 @ 16:50) (14 - 22)  SpO2: 100% (06-23-21 @ 16:50) (95% - 100%)  Wt(kg): --  UO: adequate    SUBJECTIVE: pain controlled. No CP/SOB. No N/V. Required 1L bolus for low BP.    ON PE: alert and awake    Abdomen: soft, incision sites clean and dry    : FC intact, urine clear                          11.3   12.32 )-----------( 173      ( 23 Jun 2021 14:56 )             34.8     06-23    145  |  110<H>  |  38<H>  ----------------------------<  224<H>  4.9   |  22  |  1.78<H>    Ca    9.0      23 Jun 2021 14:56  Phos  4.4     06-23  Mg     1.8     06-23

## 2021-06-23 NOTE — CHART NOTE - NSCHARTNOTEFT_GEN_A_CORE
Noted SBP 80s, HR 60s.  Repeated BP with same result.  Patient sleepy but easily arousable.  Had received dilaudid for pain, denies pain at present.  LR bolus ordered with improvement to SBP 98.    BP again decreased to 80s, repeat BP systolic 63.  Urology team and anesthesia paged and at bedside.  Placed supine, LR bolus 1000mL infusing.  Discussed with urology team plan to send stat CBC and give albumin 5% - in agreement with plan.  Repeat /53 - urology and anesthesia Dr. Robertson aware of improvement.

## 2021-06-24 ENCOUNTER — TRANSCRIPTION ENCOUNTER (OUTPATIENT)
Age: 71
End: 2021-06-24

## 2021-06-24 VITALS
OXYGEN SATURATION: 99 % | DIASTOLIC BLOOD PRESSURE: 66 MMHG | TEMPERATURE: 100 F | HEART RATE: 99 BPM | RESPIRATION RATE: 18 BRPM | SYSTOLIC BLOOD PRESSURE: 149 MMHG

## 2021-06-24 LAB
A1C WITH ESTIMATED AVERAGE GLUCOSE RESULT: 7 % — HIGH (ref 4–5.6)
ANION GAP SERPL CALC-SCNC: 14 MMOL/L — SIGNIFICANT CHANGE UP (ref 5–17)
BUN SERPL-MCNC: 33 MG/DL — HIGH (ref 7–23)
CALCIUM SERPL-MCNC: 8.7 MG/DL — SIGNIFICANT CHANGE UP (ref 8.4–10.5)
CHLORIDE SERPL-SCNC: 105 MMOL/L — SIGNIFICANT CHANGE UP (ref 96–108)
CO2 SERPL-SCNC: 23 MMOL/L — SIGNIFICANT CHANGE UP (ref 22–31)
COVID-19 SPIKE DOMAIN AB INTERP: POSITIVE
COVID-19 SPIKE DOMAIN ANTIBODY RESULT: >250 U/ML — HIGH
CREAT SERPL-MCNC: 1.58 MG/DL — HIGH (ref 0.5–1.3)
ESTIMATED AVERAGE GLUCOSE: 154 MG/DL — HIGH (ref 68–114)
GLUCOSE BLDC GLUCOMTR-MCNC: 123 MG/DL — HIGH (ref 70–99)
GLUCOSE BLDC GLUCOMTR-MCNC: 141 MG/DL — HIGH (ref 70–99)
GLUCOSE BLDC GLUCOMTR-MCNC: 196 MG/DL — HIGH (ref 70–99)
GLUCOSE SERPL-MCNC: 125 MG/DL — HIGH (ref 70–99)
HCT VFR BLD CALC: 34.3 % — LOW (ref 39–50)
HGB BLD-MCNC: 10.9 G/DL — LOW (ref 13–17)
MCHC RBC-ENTMCNC: 29.5 PG — SIGNIFICANT CHANGE UP (ref 27–34)
MCHC RBC-ENTMCNC: 31.8 GM/DL — LOW (ref 32–36)
MCV RBC AUTO: 92.7 FL — SIGNIFICANT CHANGE UP (ref 80–100)
NRBC # BLD: 0 /100 WBCS — SIGNIFICANT CHANGE UP (ref 0–0)
PLATELET # BLD AUTO: 171 K/UL — SIGNIFICANT CHANGE UP (ref 150–400)
POTASSIUM SERPL-MCNC: 4.4 MMOL/L — SIGNIFICANT CHANGE UP (ref 3.5–5.3)
POTASSIUM SERPL-SCNC: 4.4 MMOL/L — SIGNIFICANT CHANGE UP (ref 3.5–5.3)
RBC # BLD: 3.7 M/UL — LOW (ref 4.2–5.8)
RBC # FLD: 14.9 % — HIGH (ref 10.3–14.5)
SARS-COV-2 IGG+IGM SERPL QL IA: >250 U/ML — HIGH
SARS-COV-2 IGG+IGM SERPL QL IA: POSITIVE
SODIUM SERPL-SCNC: 142 MMOL/L — SIGNIFICANT CHANGE UP (ref 135–145)
WBC # BLD: 8.52 K/UL — SIGNIFICANT CHANGE UP (ref 3.8–10.5)
WBC # FLD AUTO: 8.52 K/UL — SIGNIFICANT CHANGE UP (ref 3.8–10.5)

## 2021-06-24 PROCEDURE — 88307 TISSUE EXAM BY PATHOLOGIST: CPT

## 2021-06-24 PROCEDURE — 86901 BLOOD TYPING SEROLOGIC RH(D): CPT

## 2021-06-24 PROCEDURE — 36415 COLL VENOUS BLD VENIPUNCTURE: CPT

## 2021-06-24 PROCEDURE — 84100 ASSAY OF PHOSPHORUS: CPT

## 2021-06-24 PROCEDURE — 86769 SARS-COV-2 COVID-19 ANTIBODY: CPT

## 2021-06-24 PROCEDURE — P9045: CPT

## 2021-06-24 PROCEDURE — S2900: CPT

## 2021-06-24 PROCEDURE — 80048 BASIC METABOLIC PNL TOTAL CA: CPT

## 2021-06-24 PROCEDURE — 83735 ASSAY OF MAGNESIUM: CPT

## 2021-06-24 PROCEDURE — 82962 GLUCOSE BLOOD TEST: CPT

## 2021-06-24 PROCEDURE — 83036 HEMOGLOBIN GLYCOSYLATED A1C: CPT

## 2021-06-24 PROCEDURE — 85027 COMPLETE CBC AUTOMATED: CPT

## 2021-06-24 PROCEDURE — 86900 BLOOD TYPING SEROLOGIC ABO: CPT

## 2021-06-24 PROCEDURE — 86850 RBC ANTIBODY SCREEN: CPT

## 2021-06-24 RX ORDER — OXYCODONE AND ACETAMINOPHEN 5; 325 MG/1; MG/1
1 TABLET ORAL
Qty: 8 | Refills: 0
Start: 2021-06-24 | End: 2021-06-25

## 2021-06-24 RX ORDER — TRAMADOL HYDROCHLORIDE 50 MG/1
50 TABLET ORAL EVERY 6 HOURS
Refills: 0 | Status: DISCONTINUED | OUTPATIENT
Start: 2021-06-24 | End: 2021-06-24

## 2021-06-24 RX ORDER — METOCLOPRAMIDE HCL 10 MG
10 TABLET ORAL EVERY 8 HOURS
Refills: 0 | Status: DISCONTINUED | OUTPATIENT
Start: 2021-06-24 | End: 2021-06-24

## 2021-06-24 RX ORDER — TRAMADOL HYDROCHLORIDE 50 MG/1
15 TABLET ORAL EVERY 6 HOURS
Refills: 0 | Status: DISCONTINUED | OUTPATIENT
Start: 2021-06-24 | End: 2021-06-24

## 2021-06-24 RX ADMIN — LOSARTAN POTASSIUM 100 MILLIGRAM(S): 100 TABLET, FILM COATED ORAL at 06:08

## 2021-06-24 RX ADMIN — Medication 650 MILLIGRAM(S): at 18:27

## 2021-06-24 RX ADMIN — HEPARIN SODIUM 7500 UNIT(S): 5000 INJECTION INTRAVENOUS; SUBCUTANEOUS at 15:48

## 2021-06-24 RX ADMIN — Medication 650 MILLIGRAM(S): at 18:15

## 2021-06-24 RX ADMIN — Medication 650 MILLIGRAM(S): at 06:21

## 2021-06-24 RX ADMIN — Medication 650 MILLIGRAM(S): at 06:51

## 2021-06-24 RX ADMIN — HEPARIN SODIUM 7500 UNIT(S): 5000 INJECTION INTRAVENOUS; SUBCUTANEOUS at 06:08

## 2021-06-24 RX ADMIN — Medication 2000 MILLIGRAM(S): at 11:29

## 2021-06-24 RX ADMIN — OXYCODONE HYDROCHLORIDE 5 MILLIGRAM(S): 5 TABLET ORAL at 15:09

## 2021-06-24 RX ADMIN — OXYCODONE HYDROCHLORIDE 5 MILLIGRAM(S): 5 TABLET ORAL at 11:28

## 2021-06-24 RX ADMIN — OXYCODONE HYDROCHLORIDE 5 MILLIGRAM(S): 5 TABLET ORAL at 15:51

## 2021-06-24 RX ADMIN — Medication 650 MILLIGRAM(S): at 00:15

## 2021-06-24 RX ADMIN — Medication 650 MILLIGRAM(S): at 11:35

## 2021-06-24 RX ADMIN — OXYCODONE HYDROCHLORIDE 5 MILLIGRAM(S): 5 TABLET ORAL at 15:46

## 2021-06-24 RX ADMIN — Medication 150 MILLIGRAM(S): at 15:47

## 2021-06-24 RX ADMIN — Medication 10 MILLIGRAM(S): at 18:15

## 2021-06-24 RX ADMIN — Medication 650 MILLIGRAM(S): at 00:45

## 2021-06-24 RX ADMIN — Medication 2000 MILLIGRAM(S): at 19:13

## 2021-06-24 RX ADMIN — AMLODIPINE BESYLATE 10 MILLIGRAM(S): 2.5 TABLET ORAL at 06:08

## 2021-06-24 RX ADMIN — PANTOPRAZOLE SODIUM 40 MILLIGRAM(S): 20 TABLET, DELAYED RELEASE ORAL at 06:08

## 2021-06-24 RX ADMIN — Medication 2: at 11:28

## 2021-06-24 RX ADMIN — Medication 2000 MILLIGRAM(S): at 05:38

## 2021-06-24 RX ADMIN — Medication 650 MILLIGRAM(S): at 11:28

## 2021-06-24 NOTE — PROGRESS NOTE ADULT - PROBLEM SELECTOR PLAN 1
-stable  -OOB  -IS, SCD's  -Diet: clears  -Antibx: ancef  -I's & O's  -pain control  -IVF's  -continue longo  -f/u BP's
-stable  -OOB  -IS, SCD's  -Diet: clears  -Antibx: ancef  -I's & O's  -pain control  -IVF's  -continue longo  -f/u BP's

## 2021-06-24 NOTE — DISCHARGE NOTE PROVIDER - HOSPITAL COURSE
72 yo male with L renal mass- s/p robotic assisted Left partial nephrectomy 6/21/2021. Tolerated procedure well. Uneventful post   op course. Noriega d/c'd POD #1 and voiding well. Follow up as outpt.

## 2021-06-24 NOTE — DISCHARGE NOTE PROVIDER - CARE PROVIDER_API CALL
Elliott Chan)  Urology  [address] [address]  Phone: [phone]  Fax: [phone]  Follow Up Time:

## 2021-06-24 NOTE — DISCHARGE NOTE PROVIDER - NSDCMRMEDTOKEN_GEN_ALL_CORE_FT
amLODIPine 10 mg oral tablet: 1 tab(s) orally once a day  atorvastatin 80 mg oral tablet: 1 tab(s) orally once a day (at bedtime)  Effexor  mg oral capsule, extended release: 1 cap(s) orally once a day  Flomax 0.4 mg oral capsule: 1 cap(s) orally once a day  Janumet 50 mg-500 mg oral tablet: 1 tab(s) orally 2 times a day  Jardiance 25 mg oral tablet: 1 tab(s) orally once a day (in the morning)  olmesartan-hydrochlorothiazide 40 mg-12.5 mg oral tablet: 1 tab(s) orally once a day  omeprazole 40 mg oral delayed release capsule: 1 cap(s) orally once a day  Xanax 1 mg oral tablet:  orally , As Needed  Zetia 10 mg oral tablet: 1 tab(s) orally once a day

## 2021-06-24 NOTE — DISCHARGE NOTE NURSING/CASE MANAGEMENT/SOCIAL WORK - PATIENT PORTAL LINK FT
You can access the FollowMyHealth Patient Portal offered by [facility] by registering at the following website: [email or web address]. By joining [employer]’s FollowMyHealth portal, you will also be able to view your health information using other applications (apps) compatible with our system.

## 2021-06-24 NOTE — DISCHARGE NOTE PROVIDER - NSDCCPCAREPLAN_GEN_ALL_CORE_FT
PRINCIPAL DISCHARGE DIAGNOSIS  Diagnosis: Mass of left kidney  Assessment and Plan of Treatment: Call MD for increase abdominal pain, nausea, vomiting, temperature >101.4F, or for difficulty voiding.  Hold Plavix till OK with Dr. Chan.  May resume aspirin tomorrow.  Monitor fingersticks daily.         PRINCIPAL DISCHARGE DIAGNOSIS  Diagnosis: Mass of left kidney  Assessment and Plan of Treatment: Call MD for increase abdominal pain, nausea, vomiting, temperature >101.4F, or for difficulty voiding.  May resume plavix in 7 days.   May resume aspirin today.  Monitor fingersticks daily.

## 2021-06-24 NOTE — PROGRESS NOTE ADULT - SUBJECTIVE AND OBJECTIVE BOX
AM Note    No acute events overnight.      Vital Signs Last 24 Hrs  T(C): 36.8 (06-24-21 @ 05:37), Max: 36.8 (06-24-21 @ 05:37)  T(F): 98.3 (06-24-21 @ 05:37), Max: 98.3 (06-24-21 @ 05:37)  HR: 93 (06-24-21 @ 05:37) (63 - 93)  BP: 151/66 (06-24-21 @ 05:37) (77/42 - 151/66)  BP(mean): 83 (06-23-21 @ 18:40) (32 - 86)  RR: 17 (06-24-21 @ 05:37) (14 - 22)  SpO2: 94% (06-24-21 @ 05:37) (93% - 100%)     23 Jun 2021 14:56    145    |  110    |  38     ----------------------------<  224    4.9     |  22     |  1.78     Ca    9.0        23 Jun 2021 14:56  Phos  4.4       23 Jun 2021 14:56  Mg     1.8       23 Jun 2021 14:56                            10.3   11.48 )-----------( 168      ( 23 Jun 2021 17:07 )             32.1         I&O's Summary    23 Jun 2021 07:01  -  24 Jun 2021 06:08  --------------------------------------------------------  IN: 2440 mL / OUT: 1155 mL / NET: 1285 mL          PHYSICAL EXAM:    GEN: awake and alert  ABD: nondistended, tenderness appropriate for surgery. Incisions dry and intact  : no suprapubic/CVAT

## 2021-06-24 NOTE — PROGRESS NOTE ADULT - ASSESSMENT
70 yo male s/p laparoscopic Left partial nephrectomy
72 yo male s/p laparoscopic Left partial nephrectomy 6/23

## 2021-06-25 LAB — SARS-COV-2 N GENE NPH QL NAA+PROBE: NOT DETECTED

## 2021-06-28 LAB — SURGICAL PATHOLOGY STUDY: SIGNIFICANT CHANGE UP

## 2021-07-08 ENCOUNTER — APPOINTMENT (OUTPATIENT)
Dept: UROLOGY | Facility: CLINIC | Age: 71
End: 2021-07-08
Payer: MEDICARE

## 2021-07-08 VITALS
DIASTOLIC BLOOD PRESSURE: 75 MMHG | OXYGEN SATURATION: 98 % | TEMPERATURE: 97.3 F | HEART RATE: 67 BPM | SYSTOLIC BLOOD PRESSURE: 148 MMHG

## 2021-07-08 DIAGNOSIS — N28.89 OTHER SPECIFIED DISORDERS OF KIDNEY AND URETER: ICD-10-CM

## 2021-07-08 PROCEDURE — 99024 POSTOP FOLLOW-UP VISIT: CPT

## 2021-07-08 NOTE — HISTORY OF PRESENT ILLNESS
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

## 2021-07-08 NOTE — ASSESSMENT
[FreeTextEntry1] : 71 year old male s/p left partial nephrectomy\par -pathology reviewed\par -check CBC and CMP today \par -imaging as per protocol\par \par Soreness in throat\par -trial of cepacol lozenger\par -if no improvement in 2 weeks will call office and may need further follow up \par \par RTC in 6 months after CT

## 2021-07-09 LAB
ALBUMIN SERPL ELPH-MCNC: 4.1 G/DL
ALP BLD-CCNC: 108 U/L
ALT SERPL-CCNC: 22 U/L
ANION GAP SERPL CALC-SCNC: 15 MMOL/L
AST SERPL-CCNC: 28 U/L
BASOPHILS # BLD AUTO: 0.1 K/UL
BASOPHILS NFR BLD AUTO: 1.1 %
BILIRUB SERPL-MCNC: 0.3 MG/DL
BUN SERPL-MCNC: 37 MG/DL
CALCIUM SERPL-MCNC: 9.5 MG/DL
CHLORIDE SERPL-SCNC: 104 MMOL/L
CO2 SERPL-SCNC: 20 MMOL/L
CREAT SERPL-MCNC: 1.91 MG/DL
EOSINOPHIL # BLD AUTO: 0.41 K/UL
EOSINOPHIL NFR BLD AUTO: 4.7 %
GLUCOSE SERPL-MCNC: 215 MG/DL
HCT VFR BLD CALC: 38.8 %
HGB BLD-MCNC: 11.9 G/DL
IMM GRANULOCYTES NFR BLD AUTO: 0.3 %
LYMPHOCYTES # BLD AUTO: 1.76 K/UL
LYMPHOCYTES NFR BLD AUTO: 20.1 %
MAN DIFF?: NORMAL
MCHC RBC-ENTMCNC: 29.2 PG
MCHC RBC-ENTMCNC: 30.7 GM/DL
MCV RBC AUTO: 95.3 FL
MONOCYTES # BLD AUTO: 0.62 K/UL
MONOCYTES NFR BLD AUTO: 7.1 %
NEUTROPHILS # BLD AUTO: 5.85 K/UL
NEUTROPHILS NFR BLD AUTO: 66.7 %
PLATELET # BLD AUTO: 343 K/UL
POTASSIUM SERPL-SCNC: 5.1 MMOL/L
PROT SERPL-MCNC: 6.8 G/DL
RBC # BLD: 4.07 M/UL
RBC # FLD: 14.5 %
SODIUM SERPL-SCNC: 139 MMOL/L
WBC # FLD AUTO: 8.77 K/UL

## 2021-08-12 ENCOUNTER — APPOINTMENT (OUTPATIENT)
Dept: INTERNAL MEDICINE | Facility: CLINIC | Age: 71
End: 2021-08-12
Payer: MEDICARE

## 2021-08-12 VITALS
HEART RATE: 76 BPM | OXYGEN SATURATION: 98 % | DIASTOLIC BLOOD PRESSURE: 69 MMHG | HEIGHT: 65 IN | WEIGHT: 212 LBS | RESPIRATION RATE: 15 BRPM | BODY MASS INDEX: 35.32 KG/M2 | SYSTOLIC BLOOD PRESSURE: 139 MMHG | TEMPERATURE: 98 F

## 2021-08-12 DIAGNOSIS — I82.409 ACUTE EMBOLISM AND THROMBOSIS OF UNSPECIFIED DEEP VEINS OF UNSPECIFIED LOWER EXTREMITY: ICD-10-CM

## 2021-08-12 PROCEDURE — 99214 OFFICE O/P EST MOD 30 MIN: CPT | Mod: 25

## 2021-08-12 RX ORDER — OLMESARTAN MEDOXOMIL AND HYDROCHLOROTHIAZIDE 40; 12.5 MG/1; MG/1
40-12.5 TABLET ORAL DAILY
Refills: 0 | Status: ACTIVE | COMMUNITY

## 2021-08-12 RX ORDER — ALPRAZOLAM 1 MG/1
1 TABLET ORAL
Qty: 60 | Refills: 0 | Status: COMPLETED | COMMUNITY
Start: 2021-03-23 | End: 2021-08-12

## 2021-08-12 RX ORDER — TRAMADOL HYDROCHLORIDE 50 MG/1
50 TABLET, COATED ORAL EVERY 8 HOURS
Qty: 9 | Refills: 0 | Status: COMPLETED | COMMUNITY
Start: 2021-06-25 | End: 2021-08-12

## 2021-08-12 RX ORDER — EMPAGLIFLOZIN 25 MG/1
25 TABLET, FILM COATED ORAL
Qty: 90 | Refills: 0 | Status: ACTIVE | COMMUNITY
Start: 2021-08-12

## 2021-08-12 RX ORDER — FOLIC ACID 1 MG/1
1 TABLET ORAL
Refills: 0 | Status: ACTIVE | COMMUNITY

## 2021-08-12 RX ORDER — BUDESONIDE AND FORMOTEROL FUMARATE DIHYDRATE 160; 4.5 UG/1; UG/1
160-4.5 AEROSOL RESPIRATORY (INHALATION)
Refills: 0 | Status: ACTIVE | COMMUNITY

## 2021-08-12 RX ORDER — ERGOCALCIFEROL (VITAMIN D2) 1250 MCG
50000 CAPSULE ORAL
Refills: 0 | Status: COMPLETED | COMMUNITY
End: 2021-08-12

## 2021-08-15 NOTE — HISTORY OF PRESENT ILLNESS
[Family Member] : family member [FreeTextEntry1] : follow-up  [de-identified] : HERMANN JAVIER is a 71 year old male with a PMHx of anxiety, BPH, CAD, kidney CA, CHF, dementia, DM, DVT, gout, HLD, HTN, and thyroid disease who presents today for follow-up. Pt had surgery and was found to have  renal cancer.  He is feeling better today and offers no specific complaints.

## 2021-08-15 NOTE — HEALTH RISK ASSESSMENT
[Never (0 pts)] : Never (0 points) [No] : In the past 12 months have you used drugs other than those required for medical reasons? No [No falls in past year] : Patient reported no falls in the past year [0] : 2) Feeling down, depressed, or hopeless: Not at all (0) [Good] : ~his/her~  mood as  good [] : No [Audit-CScore] : 0 [de-identified] : lightly active [de-identified] : regular [[id number]] : 0

## 2021-08-18 LAB
ALBUMIN SERPL ELPH-MCNC: 4.4 G/DL
ALP BLD-CCNC: 115 U/L
ALT SERPL-CCNC: 26 U/L
ANION GAP SERPL CALC-SCNC: 15 MMOL/L
APPEARANCE: CLEAR
AST SERPL-CCNC: 28 U/L
BACTERIA: NEGATIVE
BASOPHILS # BLD AUTO: 0.05 K/UL
BASOPHILS NFR BLD AUTO: 0.6 %
BILIRUB SERPL-MCNC: 0.2 MG/DL
BILIRUBIN URINE: NEGATIVE
BLOOD URINE: NEGATIVE
BUN SERPL-MCNC: 31 MG/DL
CALCIUM SERPL-MCNC: 9.6 MG/DL
CHLORIDE SERPL-SCNC: 103 MMOL/L
CHOLEST SERPL-MCNC: 128 MG/DL
CO2 SERPL-SCNC: 22 MMOL/L
COLOR: NORMAL
CREAT SERPL-MCNC: 1.65 MG/DL
EOSINOPHIL # BLD AUTO: 0.23 K/UL
EOSINOPHIL NFR BLD AUTO: 2.6 %
ESTIMATED AVERAGE GLUCOSE: 143 MG/DL
GLUCOSE QUALITATIVE U: ABNORMAL
GLUCOSE SERPL-MCNC: 125 MG/DL
HBA1C MFR BLD HPLC: 6.6 %
HCT VFR BLD CALC: 39.5 %
HDLC SERPL-MCNC: 48 MG/DL
HGB BLD-MCNC: 12.1 G/DL
HYALINE CASTS: 0 /LPF
IMM GRANULOCYTES NFR BLD AUTO: 0.3 %
KETONES URINE: NEGATIVE
LDLC SERPL CALC-MCNC: 53 MG/DL
LEUKOCYTE ESTERASE URINE: NEGATIVE
LYMPHOCYTES # BLD AUTO: 1.86 K/UL
LYMPHOCYTES NFR BLD AUTO: 21.3 %
MAN DIFF?: NORMAL
MCHC RBC-ENTMCNC: 28.8 PG
MCHC RBC-ENTMCNC: 30.6 GM/DL
MCV RBC AUTO: 94 FL
MICROSCOPIC-UA: NORMAL
MONOCYTES # BLD AUTO: 0.79 K/UL
MONOCYTES NFR BLD AUTO: 9.1 %
NEUTROPHILS # BLD AUTO: 5.76 K/UL
NEUTROPHILS NFR BLD AUTO: 66.1 %
NITRITE URINE: NEGATIVE
NONHDLC SERPL-MCNC: 80 MG/DL
PH URINE: 5.5
PLATELET # BLD AUTO: 231 K/UL
POTASSIUM SERPL-SCNC: 5 MMOL/L
PROT SERPL-MCNC: 6.7 G/DL
PROTEIN URINE: ABNORMAL
RBC # BLD: 4.2 M/UL
RBC # FLD: 14.2 %
RED BLOOD CELLS URINE: 0 /HPF
SODIUM SERPL-SCNC: 139 MMOL/L
SPECIFIC GRAVITY URINE: 1.01
SQUAMOUS EPITHELIAL CELLS: 0 /HPF
TRIGL SERPL-MCNC: 138 MG/DL
UROBILINOGEN URINE: NORMAL
WBC # FLD AUTO: 8.72 K/UL
WHITE BLOOD CELLS URINE: 2 /HPF

## 2021-08-19 ENCOUNTER — RX RENEWAL (OUTPATIENT)
Age: 71
End: 2021-08-19

## 2021-08-23 ENCOUNTER — APPOINTMENT (OUTPATIENT)
Dept: SPINE | Facility: CLINIC | Age: 71
End: 2021-08-23

## 2021-08-27 NOTE — ASU PATIENT PROFILE, ADULT - ALCOHOL USE HISTORY SINGLE SELECT
-Pt p/w Na 130, improved to 133 s/p 2 L NS bolus, currently running NS at 200cc/hr  In the setting of Dehydration secondary to vomiting episodes  Serum osmolality reported s/p IVF hydration
never

## 2021-09-09 LAB — PSA SERPL-MCNC: 3.87 NG/ML

## 2021-10-12 ENCOUNTER — APPOINTMENT (OUTPATIENT)
Dept: INTERNAL MEDICINE | Facility: CLINIC | Age: 71
End: 2021-10-12

## 2021-10-25 ENCOUNTER — APPOINTMENT (OUTPATIENT)
Dept: OTOLARYNGOLOGY | Facility: CLINIC | Age: 71
End: 2021-10-25

## 2021-11-16 ENCOUNTER — APPOINTMENT (OUTPATIENT)
Dept: OTOLARYNGOLOGY | Facility: CLINIC | Age: 71
End: 2021-11-16
Payer: MEDICARE

## 2021-11-16 VITALS
HEART RATE: 85 BPM | SYSTOLIC BLOOD PRESSURE: 172 MMHG | DIASTOLIC BLOOD PRESSURE: 71 MMHG | WEIGHT: 212 LBS | BODY MASS INDEX: 35.32 KG/M2 | HEIGHT: 65 IN | TEMPERATURE: 97.9 F

## 2021-11-16 PROCEDURE — 99215 OFFICE O/P EST HI 40 MIN: CPT | Mod: 25

## 2021-11-16 PROCEDURE — 31579 LARYNGOSCOPY TELESCOPIC: CPT

## 2021-11-16 NOTE — ASSESSMENT
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  This was first dx in 2011 and treated with XRT.  Overall paitent is stable and doing well.  No new symptoms or concern for recurrence.  Exam is consistent with post XRT changes to the larynx, and no evidence of recurrence.  Evidence of supraglottic compression and speech evaluation offered.  Pt would like to observe for now.  Follow up 6 mo.  Will be due for annual screening CXR May and patient to have this done prior to next visit.\par \par - fu 6 mo for re-evaluation, sooner should there be any changes or new symptoms\par - annual CXR due next May\par - consider speech therapy evaluation.

## 2021-11-16 NOTE — REASON FOR VISIT
[Initial Consultation] : an initial consultation for [Neoplasm (Malignant)] : malignant neoplasm of larynx

## 2021-11-16 NOTE — PHYSICAL EXAM
[Midline] : trachea located in midline position [Normal] : no rashes [FreeTextEntry1] : normal voice, mild hoarseness, mildly reduced range. adequate projection

## 2021-11-16 NOTE — PROCEDURE
[de-identified] : -\par Procedure Note\par \par Pre-operative Diagnosis: hx of left vocal fold cancer\par Post-operative Diagnosis: No evidence of tumors or lesions, post XRT changes noted.\par Anesthesia: Topical - 1 % Lidocaine/Phenylephrine\par Procedure:  Flexible Laryngoscopy with Stroboscopy\par  \par Procedure Details:  \par The patient was placed in the sitting position.  After decongestant and anesthesia were applied the laryngoscope was passed.  The nasal cavities, nasopharynx, oropharynx, hypopharynx, and larynx were all examined.  Vocal folds were examined during respiration and phonation.  The following findings were noted:\par \par Findings:  \par Nose: Septum is midline, turbinates are normal, nasal airways patent, mucosa normal\par Nasopharynx: Adenoids normal, no masses, eustachian tube normal\par Oropharynx: Pharyngeal walls symmetric and without lesion. Tonsils/fossae symmetric\par Hypopharynx: Hypopharynx and pyriform sinuses without lesion. No masses or asymmetry.  No pooling of secretions.\par Larynx:  Epiglottis and aryepiglottic folds were sharp and crisp bilaterally.  Bilateral false vocal folds normal appearance. Airway was widely patent.\par \par Strobe Exam Ratings\par 		\par TVF Appearance: mild edema and scarring, worse on the right post VF\par TVF Mobility: reduced abduction, but airway patent\par Edema/hypertrophy: mild\par Mucus on TVF: normal\par Glottic Closure: adequate\par Mucosal Wave: reduced\par Amplitude of Vibration: reduced\par Phase: symmetric\par Supraglottic Hyperfunction: +supraglottic compression\par Other Findings: none\par \par Condition: Stable.  Patient tolerated procedure well.\par \par Complications: None\par

## 2021-11-16 NOTE — HISTORY OF PRESENT ILLNESS
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  This was first dx in 2011 and treated with XRT.  In 2016 and again in 2017 he underwent rebiopsy for suspicious lesions concerning for recurrence, both times negative.  Today he notes no issues or changes in terms of chewing, eating or swallowing.  No voice changes and no breathing issues.  He denies any issues with fevers, night sweats, or unplanned weight loss.  No referred otalgia.  No new neck masses, or lesions. He actually notes voice improvement.  He did recently under go Left partial nephrectomy for clear cell tumor.\par \par Former smoker, quit several years ago.

## 2021-11-17 ENCOUNTER — RX RENEWAL (OUTPATIENT)
Age: 71
End: 2021-11-17

## 2021-11-23 ENCOUNTER — NON-APPOINTMENT (OUTPATIENT)
Age: 71
End: 2021-11-23

## 2021-11-23 ENCOUNTER — APPOINTMENT (OUTPATIENT)
Dept: INTERNAL MEDICINE | Facility: CLINIC | Age: 71
End: 2021-11-23
Payer: MEDICARE

## 2021-11-23 VITALS
DIASTOLIC BLOOD PRESSURE: 74 MMHG | SYSTOLIC BLOOD PRESSURE: 168 MMHG | RESPIRATION RATE: 14 BRPM | HEART RATE: 88 BPM | WEIGHT: 211 LBS | BODY MASS INDEX: 35.16 KG/M2 | HEIGHT: 65 IN | OXYGEN SATURATION: 99 % | TEMPERATURE: 98 F

## 2021-11-23 DIAGNOSIS — Z00.00 ENCOUNTER FOR GENERAL ADULT MEDICAL EXAMINATION W/OUT ABNORMAL FINDINGS: ICD-10-CM

## 2021-11-23 PROCEDURE — 93000 ELECTROCARDIOGRAM COMPLETE: CPT | Mod: 59

## 2021-11-23 PROCEDURE — 90686 IIV4 VACC NO PRSV 0.5 ML IM: CPT

## 2021-11-23 PROCEDURE — G0008: CPT

## 2021-11-23 PROCEDURE — G0439: CPT

## 2021-11-27 ENCOUNTER — NON-APPOINTMENT (OUTPATIENT)
Age: 71
End: 2021-11-27

## 2021-11-27 NOTE — PHYSICAL EXAM
[No Acute Distress] : no acute distress [Well Nourished] : well nourished [Well Developed] : well developed [Well-Appearing] : well-appearing [Normal Sclera/Conjunctiva] : normal sclera/conjunctiva [PERRL] : pupils equal round and reactive to light [EOMI] : extraocular movements intact [Normal Outer Ear/Nose] : the outer ears and nose were normal in appearance [Normal Oropharynx] : the oropharynx was normal [No JVD] : no jugular venous distention [No Lymphadenopathy] : no lymphadenopathy [Supple] : supple [Thyroid Normal, No Nodules] : the thyroid was normal and there were no nodules present [No Respiratory Distress] : no respiratory distress  [No Accessory Muscle Use] : no accessory muscle use [Clear to Auscultation] : lungs were clear to auscultation bilaterally [Normal Rate] : normal rate  [Regular Rhythm] : with a regular rhythm [Normal S1, S2] : normal S1 and S2 [No Murmur] : no murmur heard [No Carotid Bruits] : no carotid bruits [No Abdominal Bruit] : a ~M bruit was not heard ~T in the abdomen [No Varicosities] : no varicosities [Pedal Pulses Present] : the pedal pulses are present [No Edema] : there was no peripheral edema [No Palpable Aorta] : no palpable aorta [No Extremity Clubbing/Cyanosis] : no extremity clubbing/cyanosis [Soft] : abdomen soft [Non Tender] : non-tender [Non-distended] : non-distended [No Masses] : no abdominal mass palpated [No HSM] : no HSM [Normal Bowel Sounds] : normal bowel sounds [Normal Posterior Cervical Nodes] : no posterior cervical lymphadenopathy [Normal Anterior Cervical Nodes] : no anterior cervical lymphadenopathy [No CVA Tenderness] : no CVA  tenderness [No Spinal Tenderness] : no spinal tenderness [No Joint Swelling] : no joint swelling [Grossly Normal Strength/Tone] : grossly normal strength/tone [No Rash] : no rash [Coordination Grossly Intact] : coordination grossly intact [No Focal Deficits] : no focal deficits [Normal Gait] : normal gait [Deep Tendon Reflexes (DTR)] : deep tendon reflexes were 2+ and symmetric [Normal Affect] : the affect was normal [Normal Insight/Judgement] : insight and judgment were intact [de-identified] : BP RECHECKED= 140/76 [de-identified] : gynecomastia   [FreeTextEntry1] : deferred

## 2021-11-27 NOTE — PLAN
[FreeTextEntry1] : H Influenza vaccine \par \par Blood tests and urine sent to the lab\par \par EKG\par \par

## 2021-11-27 NOTE — HISTORY OF PRESENT ILLNESS
[Spouse] : spouse [FreeTextEntry1] : Physical examination  [de-identified] : HERMANN JAVIER is a 71 year old male with a PMHx of throat CA, anxiety and depression, BPH, CAD, kidney CA, CHF, dementia, DM, DVT, gout, HLD, HTN, and thyroid disease who presents today for physical examination. He is feeling okay in general however he relates pain of right knee on and off for the last 3 months. He was seen by orthopedic and was given injections. \par \par Pt has received third dose of COVID-19 vaccine series ([employer]).

## 2021-11-27 NOTE — HEALTH RISK ASSESSMENT
[Good] : ~his/her~  mood as  good [Never (0 pts)] : Never (0 points) [No] : In the past 12 months have you used drugs other than those required for medical reasons? No [No falls in past year] : Patient reported no falls in the past year [0] : 2) Feeling down, depressed, or hopeless: Not at all (0) [] : No [Audit-CScore] : 0 [de-identified] : lightly active [de-identified] : regular [[id number]] : 0

## 2021-11-28 LAB
25(OH)D3 SERPL-MCNC: 42.5 NG/ML
ALBUMIN SERPL ELPH-MCNC: 4.3 G/DL
ALP BLD-CCNC: 117 U/L
ALT SERPL-CCNC: 22 U/L
ANION GAP SERPL CALC-SCNC: 14 MMOL/L
APPEARANCE: CLEAR
AST SERPL-CCNC: 19 U/L
BACTERIA: NEGATIVE
BASOPHILS # BLD AUTO: 0.07 K/UL
BASOPHILS NFR BLD AUTO: 0.9 %
BILIRUB SERPL-MCNC: 0.3 MG/DL
BILIRUBIN URINE: NEGATIVE
BLOOD URINE: NEGATIVE
BUN SERPL-MCNC: 26 MG/DL
CALCIUM SERPL-MCNC: 9.3 MG/DL
CHLORIDE SERPL-SCNC: 107 MMOL/L
CHOLEST SERPL-MCNC: 139 MG/DL
CO2 SERPL-SCNC: 21 MMOL/L
COLOR: NORMAL
CREAT SERPL-MCNC: 1.46 MG/DL
EOSINOPHIL # BLD AUTO: 0.19 K/UL
EOSINOPHIL NFR BLD AUTO: 2.6 %
GLUCOSE QUALITATIVE U: ABNORMAL
GLUCOSE SERPL-MCNC: 175 MG/DL
HCT VFR BLD CALC: 41.3 %
HDLC SERPL-MCNC: 51 MG/DL
HGB BLD-MCNC: 12.7 G/DL
HYALINE CASTS: 1 /LPF
IMM GRANULOCYTES NFR BLD AUTO: 0.4 %
KETONES URINE: NEGATIVE
LDLC SERPL CALC-MCNC: 54 MG/DL
LEUKOCYTE ESTERASE URINE: NEGATIVE
LYMPHOCYTES # BLD AUTO: 2.09 K/UL
LYMPHOCYTES NFR BLD AUTO: 28.2 %
MAN DIFF?: NORMAL
MCHC RBC-ENTMCNC: 28.2 PG
MCHC RBC-ENTMCNC: 30.8 GM/DL
MCV RBC AUTO: 91.8 FL
MICROSCOPIC-UA: NORMAL
MONOCYTES # BLD AUTO: 0.67 K/UL
MONOCYTES NFR BLD AUTO: 9.1 %
NEUTROPHILS # BLD AUTO: 4.35 K/UL
NEUTROPHILS NFR BLD AUTO: 58.8 %
NITRITE URINE: NEGATIVE
NONHDLC SERPL-MCNC: 87 MG/DL
PH URINE: 5.5
PLATELET # BLD AUTO: 254 K/UL
POTASSIUM SERPL-SCNC: 4.7 MMOL/L
PROT SERPL-MCNC: 6.7 G/DL
PROTEIN URINE: NORMAL
PSA SERPL-MCNC: 3.79 NG/ML
RBC # BLD: 4.5 M/UL
RBC # FLD: 15.5 %
RED BLOOD CELLS URINE: 0 /HPF
SODIUM SERPL-SCNC: 142 MMOL/L
SPECIFIC GRAVITY URINE: 1.02
SQUAMOUS EPITHELIAL CELLS: 1 /HPF
T3 SERPL-MCNC: 101 NG/DL
T4 FREE SERPL-MCNC: 1.4 NG/DL
T4 SERPL-MCNC: 7.7 UG/DL
TRIGL SERPL-MCNC: 168 MG/DL
TSH SERPL-ACNC: 0.07 UIU/ML
UROBILINOGEN URINE: NORMAL
WBC # FLD AUTO: 7.4 K/UL
WHITE BLOOD CELLS URINE: 0 /HPF

## 2022-01-06 ENCOUNTER — APPOINTMENT (OUTPATIENT)
Dept: UROLOGY | Facility: CLINIC | Age: 72
End: 2022-01-06
Payer: MEDICARE

## 2022-01-06 VITALS
SYSTOLIC BLOOD PRESSURE: 148 MMHG | DIASTOLIC BLOOD PRESSURE: 69 MMHG | TEMPERATURE: 98.3 F | OXYGEN SATURATION: 99 % | HEART RATE: 88 BPM

## 2022-01-06 PROCEDURE — 99213 OFFICE O/P EST LOW 20 MIN: CPT

## 2022-01-06 NOTE — HISTORY OF PRESENT ILLNESS
[FreeTextEntry1] : Dr. Steven Diaz\par \par CC: history of kidney cancer\par \par 71M.returns for follow up with a history of kidney cancer.  \par \par I performed a left partial nephrectomy and he has done well. 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 \par \par \par \par \par \par \par

## 2022-01-06 NOTE — PHYSICAL EXAM
[Abdomen Soft] : soft [Abdomen Tenderness] : non-tender [Abdomen Mass (___ Cm)] : no abdominal mass palpated [Abdomen Hernia] : no hernia was discovered [Costovertebral Angle Tenderness] : no ~M costovertebral angle tenderness

## 2022-01-06 NOTE — ASSESSMENT
[FreeTextEntry1] : PSA screening stable \par \par History of kidney cancer\par CT abdomen and CXR ordered; if WNL then follow up in one year

## 2022-02-11 ENCOUNTER — NON-APPOINTMENT (OUTPATIENT)
Age: 72
End: 2022-02-11

## 2022-04-15 ENCOUNTER — APPOINTMENT (OUTPATIENT)
Dept: INTERNAL MEDICINE | Facility: CLINIC | Age: 72
End: 2022-04-15

## 2022-04-15 ENCOUNTER — APPOINTMENT (OUTPATIENT)
Dept: UROLOGY | Facility: CLINIC | Age: 72
End: 2022-04-15

## 2022-04-21 NOTE — REVIEW OF SYSTEMS
----- Message from Tova Ramires MD sent at 4/18/2022  1:39 PM CDT -----  LO MAGGY  SEE GI   [As Noted in HPI] : as noted in HPI [Negative] : Heme/Lymph

## 2022-05-06 ENCOUNTER — APPOINTMENT (OUTPATIENT)
Dept: UROLOGY | Facility: CLINIC | Age: 72
End: 2022-05-06
Payer: MEDICARE

## 2022-05-06 ENCOUNTER — APPOINTMENT (OUTPATIENT)
Dept: INTERNAL MEDICINE | Facility: CLINIC | Age: 72
End: 2022-05-06
Payer: MEDICARE

## 2022-05-06 VITALS
DIASTOLIC BLOOD PRESSURE: 76 MMHG | WEIGHT: 215 LBS | TEMPERATURE: 98.3 F | HEIGHT: 65 IN | SYSTOLIC BLOOD PRESSURE: 163 MMHG | OXYGEN SATURATION: 98 % | BODY MASS INDEX: 35.82 KG/M2 | HEART RATE: 78 BPM

## 2022-05-06 PROCEDURE — 99214 OFFICE O/P EST MOD 30 MIN: CPT

## 2022-05-06 PROCEDURE — 99214 OFFICE O/P EST MOD 30 MIN: CPT | Mod: 25

## 2022-05-08 LAB — SARS-COV-2 N GENE NPH QL NAA+PROBE: NOT DETECTED

## 2022-05-08 NOTE — PLAN
[FreeTextEntry1] : COVID-19 PCR \par \par Z-Benito as directed \par \par Promethazine 1 TSP PO prn cough \par RTO fasting for blood test\par \par ENT evaluation\par

## 2022-05-08 NOTE — END OF VISIT
[FreeTextEntry3] : I, Shabnam Donohue, personally transcribed these services in the presence of Dr. Steven Diaz MD. I, Dr. Steven Diaz MD, personally performed the services described in this documentation as scribed by Shabnam Donohue in my presence and it is both accurate and complete.

## 2022-05-08 NOTE — HISTORY OF PRESENT ILLNESS
[FreeTextEntry8] : HERMANN JAVIER is a 71 year old male with a PMHx of anxiety, depression, BPH, CAD, kidney cancer, CHF, dementia, DVT, DM, HLD, laryngeal cancer, HTN, HLD, and thyroid disease who presents today complaining of sore throat and hoarseness for  two weeks.

## 2022-05-08 NOTE — PHYSICAL EXAM
[No Acute Distress] : no acute distress [Well Nourished] : well nourished [Well Developed] : well developed [Well-Appearing] : well-appearing [Normal Sclera/Conjunctiva] : normal sclera/conjunctiva [PERRL] : pupils equal round and reactive to light [EOMI] : extraocular movements intact [Normal Outer Ear/Nose] : the outer ears and nose were normal in appearance [Normal Oropharynx] : the oropharynx was normal [No JVD] : no jugular venous distention [No Lymphadenopathy] : no lymphadenopathy [Supple] : supple [Thyroid Normal, No Nodules] : the thyroid was normal and there were no nodules present [No Respiratory Distress] : no respiratory distress  [No Accessory Muscle Use] : no accessory muscle use [Clear to Auscultation] : lungs were clear to auscultation bilaterally [Normal Rate] : normal rate  [Regular Rhythm] : with a regular rhythm [Normal S1, S2] : normal S1 and S2 [No Murmur] : no murmur heard [No Carotid Bruits] : no carotid bruits [No Abdominal Bruit] : a ~M bruit was not heard ~T in the abdomen [No Varicosities] : no varicosities [Pedal Pulses Present] : the pedal pulses are present [No Edema] : there was no peripheral edema [No Palpable Aorta] : no palpable aorta [No Extremity Clubbing/Cyanosis] : no extremity clubbing/cyanosis [Soft] : abdomen soft [Non Tender] : non-tender [Non-distended] : non-distended [No Masses] : no abdominal mass palpated [No HSM] : no HSM [Normal Bowel Sounds] : normal bowel sounds [Normal Posterior Cervical Nodes] : no posterior cervical lymphadenopathy [Normal Anterior Cervical Nodes] : no anterior cervical lymphadenopathy [No CVA Tenderness] : no CVA  tenderness [No Spinal Tenderness] : no spinal tenderness [No Joint Swelling] : no joint swelling [Grossly Normal Strength/Tone] : grossly normal strength/tone [No Rash] : no rash [Coordination Grossly Intact] : coordination grossly intact [No Focal Deficits] : no focal deficits [Normal Gait] : normal gait [Deep Tendon Reflexes (DTR)] : deep tendon reflexes were 2+ and symmetric [Normal Affect] : the affect was normal [Normal Insight/Judgement] : insight and judgment were intact [de-identified] : erythematous throat

## 2022-05-08 NOTE — HEALTH RISK ASSESSMENT
[Former] : Former [No] : In the past 12 months have you used drugs other than those required for medical reasons? No [No falls in past year] : Patient reported no falls in the past year [0] : 2) Feeling down, depressed, or hopeless: Not at all (0) [Good] : ~his/her~  mood as  good [Audit-CScore] : 0 [de-identified] : lightly active [de-identified] : regular [[id number]] : 0

## 2022-05-10 ENCOUNTER — RX RENEWAL (OUTPATIENT)
Age: 72
End: 2022-05-10

## 2022-05-10 RX ORDER — BLOOD-GLUCOSE METER
W/DEVICE EACH MISCELLANEOUS
Qty: 1 | Refills: 0 | Status: ACTIVE | COMMUNITY
Start: 2022-05-10 | End: 1900-01-01

## 2022-05-10 RX ORDER — LANCETS 28 GAUGE
EACH MISCELLANEOUS
Qty: 3 | Refills: 1 | Status: COMPLETED | COMMUNITY
Start: 2022-05-06 | End: 2022-05-10

## 2022-05-10 RX ORDER — BLOOD SUGAR DIAGNOSTIC
STRIP MISCELLANEOUS
Qty: 1 | Refills: 0 | Status: COMPLETED | COMMUNITY
Start: 2022-05-06 | End: 2022-05-10

## 2022-05-11 NOTE — HISTORY OF PRESENT ILLNESS
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

## 2022-05-11 NOTE — ASSESSMENT
[FreeTextEntry1] : 72 yo with renal cell cancer\par s/p robotic partial nephrectomy\par \par doing well\par \par - cont f/u with Dr. Chan\par - will f/u as needed

## 2022-05-16 ENCOUNTER — APPOINTMENT (OUTPATIENT)
Dept: SPINE | Facility: CLINIC | Age: 72
End: 2022-05-16
Payer: MEDICARE

## 2022-05-16 VITALS — SYSTOLIC BLOOD PRESSURE: 151 MMHG | HEART RATE: 66 BPM | DIASTOLIC BLOOD PRESSURE: 74 MMHG | OXYGEN SATURATION: 95 %

## 2022-05-16 VITALS — BODY MASS INDEX: 35.82 KG/M2 | HEIGHT: 65 IN | WEIGHT: 215 LBS

## 2022-05-16 DIAGNOSIS — M48.061 SPINAL STENOSIS, LUMBAR REGION WITHOUT NEUROGENIC CLAUDICATION: ICD-10-CM

## 2022-05-16 PROCEDURE — 99213 OFFICE O/P EST LOW 20 MIN: CPT

## 2022-05-16 NOTE — REASON FOR VISIT
[Follow-Up: _____] : a [unfilled] follow-up visit [Spouse] : spouse [FreeTextEntry1] : Mr Brunson is seen today for a follow up for ongoing lower back. Pain does not radiate. Six years ago he underwent a left retro peritoneal exposure L 4 L 5 fusion. He has difficulty walking secondary to right knee pain. Takes Tylenol with little relief.  He is forced to walk in a stooped fashion in order to minimize his pain. If he tries to stand up straight he developed significant pain. Physical therapy is ongoing with minimal relief. Denies any urinary or bowel dysfunction. He clearly states that his knee pain is worse than his back pain. He has plans to see an orthopedic knee specialist.\par  \par

## 2022-05-16 NOTE — PHYSICAL EXAM
[Motor Strength] : muscle strength was normal in all four extremities [Sensation Tactile Decrease] : light touch was intact [Abnormal Walk] : normal gait [No Tenderness to Palpation] : no spine tenderness on palpation [Full ROM] : full ROM [Straight-Leg Raise Test - Left] : straight leg raise of the left leg was negative [Straight-Leg Raise Test - Right] : straight leg raise  of the right leg was negative

## 2022-05-16 NOTE — ASSESSMENT
[FreeTextEntry1] : 71 year old male now six years postop left retroperitoneal fusion with ongoing lower back pain. Continue PT and do daily exercises. Referred to Dr. Winston for evaluation of right knee. He will return after he is evaluated by orthopedist.

## 2022-05-17 ENCOUNTER — APPOINTMENT (OUTPATIENT)
Dept: OTOLARYNGOLOGY | Facility: CLINIC | Age: 72
End: 2022-05-17
Payer: MEDICARE

## 2022-05-17 VITALS
HEART RATE: 65 BPM | OXYGEN SATURATION: 98 % | BODY MASS INDEX: 35.82 KG/M2 | DIASTOLIC BLOOD PRESSURE: 85 MMHG | SYSTOLIC BLOOD PRESSURE: 175 MMHG | WEIGHT: 215 LBS | TEMPERATURE: 98 F | HEIGHT: 65 IN

## 2022-05-17 PROCEDURE — 31579 LARYNGOSCOPY TELESCOPIC: CPT

## 2022-05-17 PROCEDURE — 99215 OFFICE O/P EST HI 40 MIN: CPT | Mod: 25

## 2022-05-18 NOTE — ASSESSMENT
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  This was first dx in 2011 and treated with XRT.  Overall paitent is stable and doing well.  No new symptoms or concern for recurrence.  Exam is consistent with post XRT changes to the larynx, and no evidence of recurrence.  Evidence of supraglottic compression and speech evaluation offered.  Pt would like to observe for now.  Follow up 6 mo.  Will be due for annual screening CXR May, however pt did not complete.  He will have this completed prior to next visit.\par \par - fu 6 mo for re-evaluation, sooner should there be any changes or new symptoms\par - annual CXR due \par - consider speech therapy evaluation at next visit if there is persistent or worsening dyshonia

## 2022-05-18 NOTE — PHYSICAL EXAM
[FreeTextEntry1] : normal voice, mild hoarseness, mildly reduced range. adequate projection [Midline] : trachea located in midline position [Normal] : no rashes

## 2022-05-18 NOTE — PROCEDURE
[de-identified] : -\par Procedure Note\par \par Pre-operative Diagnosis: hx of left vocal fold cancer\par Post-operative Diagnosis: No evidence of tumors or lesions, post XRT changes noted.\par Anesthesia: Topical - 1 % Lidocaine/Phenylephrine\par Procedure: Flexible Laryngoscopy with Stroboscopy\par  \par Procedure Details: \par The patient was placed in the sitting position. After decongestant and anesthesia were applied the laryngoscope was passed. The nasal cavities, nasopharynx, oropharynx, hypopharynx, and larynx were all examined. Vocal folds were examined during respiration and phonation. The following findings were noted:\par \par Findings: \par Nose: Septum is midline, turbinates are normal, nasal airways patent, mucosa normal\par Nasopharynx: Adenoids normal, no masses, eustachian tube normal\par Oropharynx: Pharyngeal walls symmetric and without lesion. Tonsils/fossae symmetric\par Hypopharynx: Hypopharynx and pyriform sinuses without lesion. No masses or asymmetry. No pooling of secretions.\par Larynx: Epiglottis and aryepiglottic folds were sharp and crisp bilaterally. Bilateral false vocal folds normal appearance. Airway was widely patent.\par \par Strobe Exam Ratings\par 		\par TVF Appearance: mild edema and scarring, worse on the right post VF\par TVF Mobility: reduced abduction, but airway patent\par Edema/hypertrophy: mild\par Mucus on TVF: normal\par Glottic Closure: adequate\par Mucosal Wave: reduced\par Amplitude of Vibration: reduced\par Phase: symmetric\par Supraglottic Hyperfunction: +supraglottic compression\par Other Findings: none\par \par Condition: Stable. Patient tolerated procedure well.\par \par Complications: None

## 2022-05-18 NOTE — HISTORY OF PRESENT ILLNESS
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  This was first dx in 2011 and treated with XRT.  In 2016 and again in 2017 he underwent rebiopsy for suspicious lesions concerning for recurrence, both times negative.  Today he notes no issues or changes in terms of chewing, eating or swallowing.  No voice changes and no breathing issues.  He denies any issues with fevers, night sweats, or unplanned weight loss.  No referred otalgia.  No new neck masses, or lesions. He actually notes voice improvement.  He did recently under go Left partial nephrectomy for clear cell tumor.\par \par Former smoker, quit several years ago.\par - [FreeTextEntry1] : Update 5/17/22\par Patient with patient is overall stable and well.  No changes to his voice.  No issues chewing, eating, or swallowing.  No breathing issues.  He has noted some laryngitis over the past few days as he has been raising his voice at home but otherwise he has been normal.  He denies any fevers, night sweats, weight loss, referred otalgia, new neck masses.  He denies any new ear, nose, throat symptoms otherwise.

## 2022-05-20 ENCOUNTER — RX RENEWAL (OUTPATIENT)
Age: 72
End: 2022-05-20

## 2022-05-20 ENCOUNTER — APPOINTMENT (OUTPATIENT)
Dept: HEART AND VASCULAR | Facility: CLINIC | Age: 72
End: 2022-05-20

## 2022-05-20 RX ORDER — PROMETHAZINE HYDROCHLORIDE 6.25 MG/5ML
6.25 SOLUTION ORAL EVERY 6 HOURS
Qty: 1 | Refills: 0 | Status: COMPLETED | COMMUNITY
Start: 2022-05-06 | End: 2022-05-20

## 2022-05-20 RX ORDER — AZITHROMYCIN 250 MG/1
250 TABLET, FILM COATED ORAL
Qty: 1 | Refills: 0 | Status: COMPLETED | COMMUNITY
Start: 2022-05-06 | End: 2022-05-20

## 2022-05-26 ENCOUNTER — APPOINTMENT (OUTPATIENT)
Dept: ORTHOPEDIC SURGERY | Facility: CLINIC | Age: 72
End: 2022-05-26

## 2022-05-26 NOTE — HISTORY OF PRESENT ILLNESS
[de-identified] : Patient is a 72 year year-old male who presents to the office today for initial evaluation of right knee pain.\par

## 2022-06-08 ENCOUNTER — APPOINTMENT (OUTPATIENT)
Dept: ORTHOPEDIC SURGERY | Facility: CLINIC | Age: 72
End: 2022-06-08

## 2022-06-08 NOTE — HISTORY OF PRESENT ILLNESS
[de-identified] : Patient is a 72 year-old male who presents to the office today for initial evaluation of right knee pain.\par

## 2022-08-03 ENCOUNTER — RX RENEWAL (OUTPATIENT)
Age: 72
End: 2022-08-03

## 2022-08-03 RX ORDER — GLIMEPIRIDE 1 MG/1
1 TABLET ORAL 3 TIMES DAILY
Qty: 270 | Refills: 1 | Status: ACTIVE | COMMUNITY
Start: 2022-05-20 | End: 1900-01-01

## 2022-08-04 ENCOUNTER — APPOINTMENT (OUTPATIENT)
Dept: INTERNAL MEDICINE | Facility: CLINIC | Age: 72
End: 2022-08-04

## 2022-08-04 ENCOUNTER — APPOINTMENT (OUTPATIENT)
Dept: PULMONOLOGY | Facility: CLINIC | Age: 72
End: 2022-08-04

## 2022-09-22 ENCOUNTER — APPOINTMENT (OUTPATIENT)
Dept: INTERNAL MEDICINE | Facility: CLINIC | Age: 72
End: 2022-09-22

## 2022-09-22 VITALS
HEIGHT: 65 IN | WEIGHT: 208 LBS | OXYGEN SATURATION: 98 % | DIASTOLIC BLOOD PRESSURE: 75 MMHG | BODY MASS INDEX: 34.66 KG/M2 | SYSTOLIC BLOOD PRESSURE: 144 MMHG | RESPIRATION RATE: 14 BRPM | HEART RATE: 81 BPM | TEMPERATURE: 98 F

## 2022-09-22 DIAGNOSIS — N28.1 CYST OF KIDNEY, ACQUIRED: ICD-10-CM

## 2022-09-22 PROCEDURE — 36415 COLL VENOUS BLD VENIPUNCTURE: CPT

## 2022-09-22 PROCEDURE — 99214 OFFICE O/P EST MOD 30 MIN: CPT | Mod: 25

## 2022-09-22 PROCEDURE — 90686 IIV4 VACC NO PRSV 0.5 ML IM: CPT

## 2022-09-22 PROCEDURE — G0008: CPT

## 2022-09-25 NOTE — HISTORY OF PRESENT ILLNESS
[FreeTextEntry1] : follow-up  [de-identified] : HERMANN JAVIER is a 72 year old male with a PMHx of anxiety, depression, BPH, CAD, kidney cancer, CHF, dementia, DVT, DM, HLD, laryngeal cancer, HTN, vocal cord cancer, and thyroid disease who presents today for follow-up. He is feeling okay and offers no specific complaints.Pt is here for blood tests. \par \par Pt was found to have left upper lung mass s/p surgery

## 2022-09-25 NOTE — PHYSICAL EXAM
[No Acute Distress] : no acute distress [Well Nourished] : well nourished [Well Developed] : well developed [Well-Appearing] : well-appearing [Normal Sclera/Conjunctiva] : normal sclera/conjunctiva [PERRL] : pupils equal round and reactive to light [EOMI] : extraocular movements intact [Normal Outer Ear/Nose] : the outer ears and nose were normal in appearance [Normal Oropharynx] : the oropharynx was normal [No JVD] : no jugular venous distention [No Lymphadenopathy] : no lymphadenopathy [Supple] : supple [Thyroid Normal, No Nodules] : the thyroid was normal and there were no nodules present [No Respiratory Distress] : no respiratory distress  [No Accessory Muscle Use] : no accessory muscle use [Clear to Auscultation] : lungs were clear to auscultation bilaterally [Normal Rate] : normal rate  [Regular Rhythm] : with a regular rhythm [Normal S1, S2] : normal S1 and S2 [No Murmur] : no murmur heard [No Carotid Bruits] : no carotid bruits [No Abdominal Bruit] : a ~M bruit was not heard ~T in the abdomen [No Varicosities] : no varicosities [Pedal Pulses Present] : the pedal pulses are present [No Edema] : there was no peripheral edema [No Palpable Aorta] : no palpable aorta [No Extremity Clubbing/Cyanosis] : no extremity clubbing/cyanosis [Soft] : abdomen soft [Non Tender] : non-tender [Non-distended] : non-distended [No Masses] : no abdominal mass palpated [No HSM] : no HSM [Normal Bowel Sounds] : normal bowel sounds [Normal Posterior Cervical Nodes] : no posterior cervical lymphadenopathy [Normal Anterior Cervical Nodes] : no anterior cervical lymphadenopathy [No CVA Tenderness] : no CVA  tenderness [No Spinal Tenderness] : no spinal tenderness [No Joint Swelling] : no joint swelling [Grossly Normal Strength/Tone] : grossly normal strength/tone [No Rash] : no rash [Coordination Grossly Intact] : coordination grossly intact [No Focal Deficits] : no focal deficits [Normal Gait] : normal gait [Deep Tendon Reflexes (DTR)] : deep tendon reflexes were 2+ and symmetric [Normal Affect] : the affect was normal [Normal Insight/Judgement] : insight and judgment were intact [de-identified] : obese [FreeTextEntry1] : deferred

## 2022-09-25 NOTE — HEALTH RISK ASSESSMENT
[Former] : Former [No] : In the past 12 months have you used drugs other than those required for medical reasons? No [No falls in past year] : Patient reported no falls in the past year [0] : 2) Feeling down, depressed, or hopeless: Not at all (0) [Good] : ~his/her~  mood as  good [Audit-CScore] : 0 [de-identified] : lightly active [de-identified] : regular [[id number]] : 0

## 2022-10-31 ENCOUNTER — OUTPATIENT (OUTPATIENT)
Dept: OUTPATIENT SERVICES | Facility: HOSPITAL | Age: 72
LOS: 1 days | End: 2022-10-31
Payer: MEDICARE

## 2022-10-31 ENCOUNTER — RESULT REVIEW (OUTPATIENT)
Age: 72
End: 2022-10-31

## 2022-10-31 ENCOUNTER — APPOINTMENT (OUTPATIENT)
Dept: MRI IMAGING | Facility: CLINIC | Age: 72
End: 2022-10-31

## 2022-10-31 DIAGNOSIS — Z98.89 OTHER SPECIFIED POSTPROCEDURAL STATES: Chronic | ICD-10-CM

## 2022-10-31 DIAGNOSIS — Z00.8 ENCOUNTER FOR OTHER GENERAL EXAMINATION: ICD-10-CM

## 2022-10-31 DIAGNOSIS — Z96.652 PRESENCE OF LEFT ARTIFICIAL KNEE JOINT: Chronic | ICD-10-CM

## 2022-10-31 DIAGNOSIS — Z95.5 PRESENCE OF CORONARY ANGIOPLASTY IMPLANT AND GRAFT: Chronic | ICD-10-CM

## 2022-10-31 PROCEDURE — 74183 MRI ABD W/O CNTR FLWD CNTR: CPT | Mod: 26

## 2022-11-01 LAB
25(OH)D3 SERPL-MCNC: 38.4 NG/ML
ALBUMIN SERPL ELPH-MCNC: 4.4 G/DL
ALP BLD-CCNC: 134 U/L
ALT SERPL-CCNC: 15 U/L
ANION GAP SERPL CALC-SCNC: 13 MMOL/L
APPEARANCE: CLEAR
AST SERPL-CCNC: 21 U/L
BACTERIA: NEGATIVE
BASOPHILS # BLD AUTO: 0.06 K/UL
BASOPHILS NFR BLD AUTO: 0.7 %
BILIRUB SERPL-MCNC: 0.3 MG/DL
BILIRUBIN URINE: NEGATIVE
BLOOD URINE: NEGATIVE
BUN SERPL-MCNC: 33 MG/DL
CALCIUM SERPL-MCNC: 9.5 MG/DL
CHLORIDE SERPL-SCNC: 107 MMOL/L
CHOLEST SERPL-MCNC: 128 MG/DL
CO2 SERPL-SCNC: 23 MMOL/L
COLOR: NORMAL
CREAT SERPL-MCNC: 1.56 MG/DL
EGFR: 47 ML/MIN/1.73M2
EOSINOPHIL # BLD AUTO: 0.36 K/UL
EOSINOPHIL NFR BLD AUTO: 4.3 %
ESTIMATED AVERAGE GLUCOSE: 131 MG/DL
GLUCOSE QUALITATIVE U: ABNORMAL
GLUCOSE SERPL-MCNC: 124 MG/DL
HBA1C MFR BLD HPLC: 6.2 %
HCT VFR BLD CALC: 38.9 %
HDLC SERPL-MCNC: 40 MG/DL
HGB BLD-MCNC: 12.1 G/DL
HYALINE CASTS: 0 /LPF
IMM GRANULOCYTES NFR BLD AUTO: 0.1 %
KETONES URINE: NEGATIVE
LDLC SERPL CALC-MCNC: 58 MG/DL
LEUKOCYTE ESTERASE URINE: NEGATIVE
LYMPHOCYTES # BLD AUTO: 1.66 K/UL
LYMPHOCYTES NFR BLD AUTO: 19.9 %
MAN DIFF?: NORMAL
MCHC RBC-ENTMCNC: 28.1 PG
MCHC RBC-ENTMCNC: 31.1 GM/DL
MCV RBC AUTO: 90.5 FL
MICROSCOPIC-UA: NORMAL
MONOCYTES # BLD AUTO: 0.67 K/UL
MONOCYTES NFR BLD AUTO: 8 %
NEUTROPHILS # BLD AUTO: 5.57 K/UL
NEUTROPHILS NFR BLD AUTO: 67 %
NITRITE URINE: NEGATIVE
NONHDLC SERPL-MCNC: 88 MG/DL
PH URINE: 6
PLATELET # BLD AUTO: 210 K/UL
POTASSIUM SERPL-SCNC: 4.8 MMOL/L
PROT SERPL-MCNC: 6.8 G/DL
PROTEIN URINE: ABNORMAL
PSA SERPL-MCNC: 7.7 NG/ML
RBC # BLD: 4.3 M/UL
RBC # FLD: 15.3 %
RED BLOOD CELLS URINE: 0 /HPF
SODIUM SERPL-SCNC: 143 MMOL/L
SPECIFIC GRAVITY URINE: 1.02
SQUAMOUS EPITHELIAL CELLS: 1 /HPF
T3 SERPL-MCNC: 95 NG/DL
T4 FREE SERPL-MCNC: 1.2 NG/DL
T4 SERPL-MCNC: 7.2 UG/DL
TRIGL SERPL-MCNC: 148 MG/DL
TSH SERPL-ACNC: 2.86 UIU/ML
UROBILINOGEN URINE: NORMAL
VIT B12 SERPL-MCNC: 615 PG/ML
WBC # FLD AUTO: 8.33 K/UL
WHITE BLOOD CELLS URINE: 1 /HPF

## 2022-11-15 ENCOUNTER — APPOINTMENT (OUTPATIENT)
Dept: OTOLARYNGOLOGY | Facility: CLINIC | Age: 72
End: 2022-11-15

## 2022-11-15 VITALS
BODY MASS INDEX: 34.66 KG/M2 | OXYGEN SATURATION: 98 % | HEIGHT: 65 IN | HEART RATE: 83 BPM | WEIGHT: 208 LBS | SYSTOLIC BLOOD PRESSURE: 149 MMHG | DIASTOLIC BLOOD PRESSURE: 76 MMHG | TEMPERATURE: 98 F

## 2022-11-15 PROCEDURE — 31579 LARYNGOSCOPY TELESCOPIC: CPT

## 2022-11-15 PROCEDURE — 99215 OFFICE O/P EST HI 40 MIN: CPT | Mod: 25

## 2022-11-17 NOTE — HISTORY OF PRESENT ILLNESS
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  This was first dx in 2011 and treated with XRT.  In 2016 and again in 2017 he underwent rebiopsy for suspicious lesions concerning for recurrence, both times negative.  Today he notes no issues or changes in terms of chewing, eating or swallowing.  No voice changes and no breathing issues.  He denies any issues with fevers, night sweats, or unplanned weight loss.  No referred otalgia.  No new neck masses, or lesions. He actually notes voice improvement.  He did recently under go Left partial nephrectomy for clear cell tumor.\par \par Former smoker, quit several years ago.\par -\par Update 5/17/22\par Patient with patient is overall stable and well.  No changes to his voice.  No issues chewing, eating, or swallowing.  No breathing issues.  He has noted some laryngitis over the past few days as he has been raising his voice at home but otherwise he has been normal.  He denies any fevers, night sweats, weight loss, referred otalgia, new neck masses.  He denies any new ear, nose, throat symptoms otherwise.\par - [FreeTextEntry1] : 11/15/22\par At the time of the last visit I requested a chest x-ray which was not completed.  The following month he actually did have a CT chest and was ultimately diagnosed with lung cancer, left upper lobe.  The patient underwent resection without need for chemotherapy or XRT.  Per the patient he is now cancer free.  During that work-up he did complete a PET/CT and this was negative for any disease in the neck.\par \par Patient with patient is overall stable and well.  No changes to his voice.  No issues chewing, eating, or swallowing.  No breathing issues.  He denies any fevers, night sweats, weight loss, referred otalgia, new neck masses.  He denies any new ear, nose, throat symptoms otherwise.

## 2022-11-17 NOTE — ASSESSMENT
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  This was first dx in 2011 and treated with XRT.    Since the last visit he was diagnosed with a left lung cancer which is already resected without need for chemo/XRT.  Patient has recovered well.  PET/CT was done at that time and patient have those results sent to me for review.\par \par Currently, overall patient is stable and doing well.  No new symptoms or concern for recurrence.  Exam is consistent with post XRT changes to the larynx, and no evidence of recurrence.  Evidence of supraglottic compression and speech evaluation offered.  Pt would like to observe for now.  Follow up 6 mo.  \par \par \par - fu 6 mo for re-evaluation, sooner should there be any changes or new symptoms\par - annual lung monitoring with at least CXR to be followed by pulm/CTSX given recent dx\par - consider speech therapy evaluation at next visit if there is persistent or worsening dysphonia

## 2022-11-17 NOTE — PROCEDURE
[de-identified] :  -\par Procedure Note\par \par Pre-operative Diagnosis: hx of left vocal fold cancer\par Post-operative Diagnosis: No evidence of tumors or lesions, post XRT changes noted.\par Anesthesia: Topical - 1 % Lidocaine/Phenylephrine\par Procedure: Flexible Laryngoscopy with Stroboscopy\par  \par Procedure Details: \par The patient was placed in the sitting position. After decongestant and anesthesia were applied the laryngoscope was passed. The nasal cavities, nasopharynx, oropharynx, hypopharynx, and larynx were all examined. Vocal folds were examined during respiration and phonation. The following findings were noted:\par \par Findings: \par Nose: Septum is midline, turbinates are normal, nasal airways patent, mucosa normal\par Nasopharynx: Adenoids normal, no masses, eustachian tube normal\par Oropharynx: Pharyngeal walls symmetric and without lesion. Tonsils/fossae symmetric\par Hypopharynx: Hypopharynx and pyriform sinuses without lesion. No masses or asymmetry. No pooling of secretions.\par Larynx: Epiglottis and aryepiglottic folds were sharp and crisp bilaterally. Bilateral false vocal folds normal appearance. Airway was widely patent.\par \par Strobe Exam Ratings\par 		\par TVF Appearance: mild edema and scarring, worse on the right post VF\par TVF Mobility: reduced abduction, but airway patent\par Edema/hypertrophy: mild\par Mucus on TVF: normal\par Glottic Closure: adequate\par Mucosal Wave: reduced\par Amplitude of Vibration: reduced\par Phase: symmetric\par Supraglottic Hyperfunction: +supraglottic compression\par Other Findings: none\par \par Condition: Stable. Patient tolerated procedure well.\par \par Complications: None. \par  \par

## 2022-11-18 ENCOUNTER — APPOINTMENT (OUTPATIENT)
Dept: INTERNAL MEDICINE | Facility: CLINIC | Age: 72
End: 2022-11-18

## 2022-11-23 ENCOUNTER — APPOINTMENT (OUTPATIENT)
Dept: INTERNAL MEDICINE | Facility: CLINIC | Age: 72
End: 2022-11-23

## 2022-12-01 ENCOUNTER — APPOINTMENT (OUTPATIENT)
Dept: INTERNAL MEDICINE | Facility: CLINIC | Age: 72
End: 2022-12-01

## 2022-12-01 ENCOUNTER — RX RENEWAL (OUTPATIENT)
Age: 72
End: 2022-12-01

## 2022-12-01 VITALS
HEIGHT: 65 IN | BODY MASS INDEX: 34.49 KG/M2 | SYSTOLIC BLOOD PRESSURE: 146 MMHG | RESPIRATION RATE: 16 BRPM | OXYGEN SATURATION: 98 % | WEIGHT: 207 LBS | TEMPERATURE: 97.8 F | DIASTOLIC BLOOD PRESSURE: 71 MMHG | HEART RATE: 89 BPM

## 2022-12-01 PROCEDURE — 99214 OFFICE O/P EST MOD 30 MIN: CPT | Mod: 25

## 2022-12-07 NOTE — HISTORY OF PRESENT ILLNESS
[Spouse] : spouse [FreeTextEntry1] : follow-up DM, HLD, HTN [de-identified] : DENIZ JAVIER is a 72 year old male with a PMHx of anxiety, depression, BPH, CAD, kidney cancer s/p partial nephrectomy, CHF, dementia, DVT, DM, HLD, laryngeal cancer, HTN, vocal cord cancer, and thyroid disease who presents today for follow-up. He relates nasal congestion, cough, with light brown phlegm for a  few weeks now.

## 2022-12-07 NOTE — HEALTH RISK ASSESSMENT
[Former] : Former [No] : In the past 12 months have you used drugs other than those required for medical reasons? No [No falls in past year] : Patient reported no falls in the past year [0] : 2) Feeling down, depressed, or hopeless: Not at all (0) [Good] : ~his/her~  mood as  good [Audit-CScore] : 0 [de-identified] : lightly active [de-identified] : regular [[id number]] : 0

## 2022-12-07 NOTE — PLAN
[FreeTextEntry1] : Blood tests and urine sent to the lab\par \par \par Z-Benito as directed \par \par Flonase 2 puffs to each nostril QD\par \par Promethazine 1 TSP PO Q6Hrs prn cough

## 2022-12-09 ENCOUNTER — APPOINTMENT (OUTPATIENT)
Dept: UROLOGY | Facility: CLINIC | Age: 72
End: 2022-12-09

## 2022-12-09 PROCEDURE — 99214 OFFICE O/P EST MOD 30 MIN: CPT

## 2022-12-09 NOTE — HISTORY OF PRESENT ILLNESS
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

## 2022-12-09 NOTE — ASSESSMENT
[FreeTextEntry1] : 70 yo with renal cell cancer\par s/p robotic partial nephrectomy\par \par - elevation in PSA related to recent lung surgery\par - will repeat PSA in 6 months\par - no indication for acute intervention\par - continue f/u with Dr. Chan for renal cancer

## 2022-12-18 ENCOUNTER — RX RENEWAL (OUTPATIENT)
Age: 72
End: 2022-12-18

## 2022-12-19 ENCOUNTER — APPOINTMENT (OUTPATIENT)
Dept: INTERNAL MEDICINE | Facility: CLINIC | Age: 72
End: 2022-12-19
Payer: MEDICARE

## 2022-12-19 VITALS
BODY MASS INDEX: 34.16 KG/M2 | TEMPERATURE: 97 F | HEART RATE: 98 BPM | WEIGHT: 205 LBS | SYSTOLIC BLOOD PRESSURE: 138 MMHG | HEIGHT: 65 IN | RESPIRATION RATE: 16 BRPM | OXYGEN SATURATION: 97 % | DIASTOLIC BLOOD PRESSURE: 71 MMHG

## 2022-12-19 DIAGNOSIS — Z85.21 PERSONAL HISTORY OF MALIGNANT NEOPLASM OF LARYNX: ICD-10-CM

## 2022-12-19 PROCEDURE — 99214 OFFICE O/P EST MOD 30 MIN: CPT | Mod: 25

## 2022-12-24 NOTE — HISTORY OF PRESENT ILLNESS
[Spouse] : spouse [FreeTextEntry1] : follow-up  [de-identified] : DENIZ JAVIER is a 72 year old male with a PMHx of anxiety, depression, BPH, CAD, kidney cancer s/p partial nephrectomy, CHF, dementia, DVT, DM, HLD, laryngeal cancer, HTN, vocal cord cancer, and thyroid disease who presents today for follow-up. He was\par seen at the  ER of  [facility] [place] on 12/14/2022 for severe cough \par \par CTA chest showed no acute pulmonary embolus \par Blood test were WNL \par

## 2022-12-24 NOTE — HEALTH RISK ASSESSMENT
[Former] : Former [No] : In the past 12 months have you used drugs other than those required for medical reasons? No [No falls in past year] : Patient reported no falls in the past year [0] : 2) Feeling down, depressed, or hopeless: Not at all (0) [Good] : ~his/her~  mood as  good [Audit-CScore] : 0 [de-identified] : lightly active [de-identified] : regular [[id number]] : 0

## 2022-12-24 NOTE — PHYSICAL EXAM
[No Acute Distress] : no acute distress [Well Nourished] : well nourished [Well Developed] : well developed [Well-Appearing] : well-appearing [Normal Sclera/Conjunctiva] : normal sclera/conjunctiva [PERRL] : pupils equal round and reactive to light [EOMI] : extraocular movements intact [Normal Outer Ear/Nose] : the outer ears and nose were normal in appearance [Normal Oropharynx] : the oropharynx was normal [No JVD] : no jugular venous distention [No Lymphadenopathy] : no lymphadenopathy [Supple] : supple [Thyroid Normal, No Nodules] : the thyroid was normal and there were no nodules present [No Respiratory Distress] : no respiratory distress  [No Accessory Muscle Use] : no accessory muscle use [Clear to Auscultation] : lungs were clear to auscultation bilaterally [Normal Rate] : normal rate  [Regular Rhythm] : with a regular rhythm [Normal S1, S2] : normal S1 and S2 [No Murmur] : no murmur heard [No Carotid Bruits] : no carotid bruits [No Abdominal Bruit] : a ~M bruit was not heard ~T in the abdomen [No Varicosities] : no varicosities [Pedal Pulses Present] : the pedal pulses are present [No Edema] : there was no peripheral edema [No Palpable Aorta] : no palpable aorta [No Extremity Clubbing/Cyanosis] : no extremity clubbing/cyanosis [Soft] : abdomen soft [Non Tender] : non-tender [Non-distended] : non-distended [No Masses] : no abdominal mass palpated [No HSM] : no HSM [Normal Bowel Sounds] : normal bowel sounds [Normal Posterior Cervical Nodes] : no posterior cervical lymphadenopathy [Normal Anterior Cervical Nodes] : no anterior cervical lymphadenopathy [No CVA Tenderness] : no CVA  tenderness [No Spinal Tenderness] : no spinal tenderness [No Joint Swelling] : no joint swelling [Grossly Normal Strength/Tone] : grossly normal strength/tone [No Rash] : no rash [Coordination Grossly Intact] : coordination grossly intact [No Focal Deficits] : no focal deficits [Normal Gait] : normal gait [Deep Tendon Reflexes (DTR)] : deep tendon reflexes were 2+ and symmetric [Normal Affect] : the affect was normal [Normal Insight/Judgement] : insight and judgment were intact [FreeTextEntry1] : deferred

## 2022-12-24 NOTE — PLAN
[FreeTextEntry1] : Tessalon Perles 100 mg PO BID \par \par Continue other medications \par \par ADA diet

## 2022-12-26 LAB
ALBUMIN SERPL ELPH-MCNC: 4.1 G/DL
ALP BLD-CCNC: 131 U/L
ALT SERPL-CCNC: 13 U/L
ANION GAP SERPL CALC-SCNC: 16 MMOL/L
AST SERPL-CCNC: 18 U/L
BILIRUB SERPL-MCNC: 0.3 MG/DL
BUN SERPL-MCNC: 37 MG/DL
CALCIUM SERPL-MCNC: 9.3 MG/DL
CHLORIDE SERPL-SCNC: 105 MMOL/L
CO2 SERPL-SCNC: 21 MMOL/L
CREAT SERPL-MCNC: 1.62 MG/DL
EGFR: 45 ML/MIN/1.73M2
ESTIMATED AVERAGE GLUCOSE: 146 MG/DL
GLUCOSE SERPL-MCNC: 191 MG/DL
HBA1C MFR BLD HPLC: 6.7 %
POTASSIUM SERPL-SCNC: 4.5 MMOL/L
PROT SERPL-MCNC: 6.5 G/DL
PSA SERPL-MCNC: 4.03 NG/ML
SODIUM SERPL-SCNC: 142 MMOL/L

## 2022-12-29 LAB
BASOPHILS # BLD AUTO: 0.06 K/UL
BASOPHILS NFR BLD AUTO: 0.9 %
EOSINOPHIL # BLD AUTO: 0.24 K/UL
EOSINOPHIL NFR BLD AUTO: 3.4 %
HCT VFR BLD CALC: 40.6 %
HGB BLD-MCNC: 12.6 G/DL
IMM GRANULOCYTES NFR BLD AUTO: 0.3 %
LYMPHOCYTES # BLD AUTO: 1.62 K/UL
LYMPHOCYTES NFR BLD AUTO: 23.2 %
MAN DIFF?: NORMAL
MCHC RBC-ENTMCNC: 27.2 PG
MCHC RBC-ENTMCNC: 31 GM/DL
MCV RBC AUTO: 87.7 FL
MONOCYTES # BLD AUTO: 0.51 K/UL
MONOCYTES NFR BLD AUTO: 7.3 %
NEUTROPHILS # BLD AUTO: 4.53 K/UL
NEUTROPHILS NFR BLD AUTO: 64.9 %
PLATELET # BLD AUTO: 221 K/UL
RBC # BLD: 4.63 M/UL
RBC # FLD: 15.3 %
WBC # FLD AUTO: 6.98 K/UL

## 2023-02-03 ENCOUNTER — RX RENEWAL (OUTPATIENT)
Age: 73
End: 2023-02-03

## 2023-03-06 ENCOUNTER — RX RENEWAL (OUTPATIENT)
Age: 73
End: 2023-03-06

## 2023-03-13 ENCOUNTER — APPOINTMENT (OUTPATIENT)
Dept: INTERNAL MEDICINE | Facility: CLINIC | Age: 73
End: 2023-03-13

## 2023-03-15 ENCOUNTER — APPOINTMENT (OUTPATIENT)
Dept: INTERNAL MEDICINE | Facility: CLINIC | Age: 73
End: 2023-03-15
Payer: MEDICARE

## 2023-03-15 ENCOUNTER — RX RENEWAL (OUTPATIENT)
Age: 73
End: 2023-03-15

## 2023-03-15 VITALS
RESPIRATION RATE: 16 BRPM | WEIGHT: 206 LBS | BODY MASS INDEX: 34.32 KG/M2 | DIASTOLIC BLOOD PRESSURE: 69 MMHG | TEMPERATURE: 97.9 F | HEIGHT: 65 IN | HEART RATE: 80 BPM | SYSTOLIC BLOOD PRESSURE: 125 MMHG | OXYGEN SATURATION: 96 %

## 2023-03-15 DIAGNOSIS — J06.9 ACUTE UPPER RESPIRATORY INFECTION, UNSPECIFIED: ICD-10-CM

## 2023-03-15 DIAGNOSIS — G47.00 INSOMNIA, UNSPECIFIED: ICD-10-CM

## 2023-03-15 PROCEDURE — 99214 OFFICE O/P EST MOD 30 MIN: CPT | Mod: 25

## 2023-03-15 PROCEDURE — 36415 COLL VENOUS BLD VENIPUNCTURE: CPT

## 2023-03-15 RX ORDER — ZOLPIDEM TARTRATE 5 MG/1
5 TABLET ORAL
Qty: 30 | Refills: 0 | Status: ACTIVE | COMMUNITY
Start: 2023-03-15 | End: 1900-01-01

## 2023-03-19 LAB
ALBUMIN SERPL ELPH-MCNC: 4.3 G/DL
ALP BLD-CCNC: 139 U/L
ALT SERPL-CCNC: 16 U/L
ANION GAP SERPL CALC-SCNC: 11 MMOL/L
APPEARANCE: CLEAR
AST SERPL-CCNC: 15 U/L
BACTERIA: NEGATIVE
BASOPHILS # BLD AUTO: 0.08 K/UL
BASOPHILS NFR BLD AUTO: 1 %
BILIRUB SERPL-MCNC: 0.3 MG/DL
BILIRUBIN URINE: NEGATIVE
BLOOD URINE: NEGATIVE
BUN SERPL-MCNC: 37 MG/DL
CALCIUM SERPL-MCNC: 9.4 MG/DL
CHLORIDE SERPL-SCNC: 103 MMOL/L
CHOLEST SERPL-MCNC: 134 MG/DL
CO2 SERPL-SCNC: 28 MMOL/L
COLOR: NORMAL
CREAT SERPL-MCNC: 1.7 MG/DL
EGFR: 42 ML/MIN/1.73M2
EOSINOPHIL # BLD AUTO: 0.22 K/UL
EOSINOPHIL NFR BLD AUTO: 2.7 %
ESTIMATED AVERAGE GLUCOSE: 148 MG/DL
GLUCOSE QUALITATIVE U: ABNORMAL
GLUCOSE SERPL-MCNC: 189 MG/DL
HBA1C MFR BLD HPLC: 6.8 %
HCT VFR BLD CALC: 40.5 %
HDLC SERPL-MCNC: 45 MG/DL
HGB BLD-MCNC: 12.2 G/DL
HYALINE CASTS: 0 /LPF
IMM GRANULOCYTES NFR BLD AUTO: 0.4 %
KETONES URINE: NEGATIVE
LDLC SERPL CALC-MCNC: 64 MG/DL
LEUKOCYTE ESTERASE URINE: NEGATIVE
LYMPHOCYTES # BLD AUTO: 1.81 K/UL
LYMPHOCYTES NFR BLD AUTO: 22.6 %
MAN DIFF?: NORMAL
MCHC RBC-ENTMCNC: 26.6 PG
MCHC RBC-ENTMCNC: 30.1 GM/DL
MCV RBC AUTO: 88.4 FL
MICROSCOPIC-UA: NORMAL
MONOCYTES # BLD AUTO: 0.67 K/UL
MONOCYTES NFR BLD AUTO: 8.4 %
NEUTROPHILS # BLD AUTO: 5.21 K/UL
NEUTROPHILS NFR BLD AUTO: 64.9 %
NITRITE URINE: NEGATIVE
NONHDLC SERPL-MCNC: 89 MG/DL
PH URINE: 6
PLATELET # BLD AUTO: 208 K/UL
POTASSIUM SERPL-SCNC: 4.3 MMOL/L
PROT SERPL-MCNC: 6.9 G/DL
PROTEIN URINE: ABNORMAL
PSA SERPL-MCNC: 3.32 NG/ML
RBC # BLD: 4.58 M/UL
RBC # FLD: 15.6 %
RED BLOOD CELLS URINE: 0 /HPF
SODIUM SERPL-SCNC: 143 MMOL/L
SPECIFIC GRAVITY URINE: 1.02
SQUAMOUS EPITHELIAL CELLS: 1 /HPF
T3 SERPL-MCNC: 92 NG/DL
T4 FREE SERPL-MCNC: 1.4 NG/DL
T4 SERPL-MCNC: 7.3 UG/DL
TRIGL SERPL-MCNC: 124 MG/DL
TSH SERPL-ACNC: 1.66 UIU/ML
UROBILINOGEN URINE: NORMAL
VIT B12 SERPL-MCNC: 600 PG/ML
WBC # FLD AUTO: 8.02 K/UL
WHITE BLOOD CELLS URINE: 1 /HPF

## 2023-03-20 RX ORDER — SITAGLIPTIN AND METFORMIN HYDROCHLORIDE 50; 500 MG/1; MG/1
50-500 TABLET, FILM COATED, EXTENDED RELEASE ORAL TWICE DAILY
Refills: 0 | Status: DISCONTINUED | COMMUNITY
End: 2023-03-20

## 2023-03-20 NOTE — HISTORY OF PRESENT ILLNESS
[Spouse] : spouse [FreeTextEntry8] : DENIZ JAVIER is a 72 year old male c/o sore throat, cough, congestion x2weeks. He was seen by an oncologist recently and was told all was good.

## 2023-03-20 NOTE — HEALTH RISK ASSESSMENT
[No] : In the past 12 months have you used drugs other than those required for medical reasons? No [No falls in past year] : Patient reported no falls in the past year [0] : 2) Feeling down, depressed, or hopeless: Not at all (0) [Former] : Former [Audit-CScore] : 0 [de-identified] : lightly active [de-identified] : regular [[id number]] : 0

## 2023-03-20 NOTE — PLAN
[FreeTextEntry1] : Z-Benito as directed\par \par  Promethazine 1 tsp PO q6hrs prn cough \par \par Flonase 2 puffs to each nostril qd \par \par Blood tests and urine sent to the lab \par \par ENT re evaluation

## 2023-03-20 NOTE — END OF VISIT
[FreeTextEntry3] :  I, Daisha Lorenz, personally transcribed these services in the presence of Dr. Steven Diaz MD.\par I, Dr. Steven Diaz MD, personally performed the services described in this documentation as scribed by Daisha Lorenz in my presence and it is both accurate and complete.

## 2023-03-20 NOTE — PHYSICAL EXAM
[No Acute Distress] : no acute distress [Well Nourished] : well nourished [Well Developed] : well developed [Well-Appearing] : well-appearing [Normal Sclera/Conjunctiva] : normal sclera/conjunctiva [PERRL] : pupils equal round and reactive to light [EOMI] : extraocular movements intact [Normal Outer Ear/Nose] : the outer ears and nose were normal in appearance [Normal Oropharynx] : the oropharynx was normal [No JVD] : no jugular venous distention [No Lymphadenopathy] : no lymphadenopathy [Supple] : supple [Thyroid Normal, No Nodules] : the thyroid was normal and there were no nodules present [No Respiratory Distress] : no respiratory distress  [No Accessory Muscle Use] : no accessory muscle use [Clear to Auscultation] : lungs were clear to auscultation bilaterally [Normal Rate] : normal rate  [Regular Rhythm] : with a regular rhythm [Normal S1, S2] : normal S1 and S2 [No Murmur] : no murmur heard [No Carotid Bruits] : no carotid bruits [No Abdominal Bruit] : a ~M bruit was not heard ~T in the abdomen [No Varicosities] : no varicosities [Pedal Pulses Present] : the pedal pulses are present [No Edema] : there was no peripheral edema [No Palpable Aorta] : no palpable aorta [No Extremity Clubbing/Cyanosis] : no extremity clubbing/cyanosis [Soft] : abdomen soft [Non Tender] : non-tender [Non-distended] : non-distended [No Masses] : no abdominal mass palpated [No HSM] : no HSM [Normal Bowel Sounds] : normal bowel sounds [Normal Posterior Cervical Nodes] : no posterior cervical lymphadenopathy [Normal Anterior Cervical Nodes] : no anterior cervical lymphadenopathy [No CVA Tenderness] : no CVA  tenderness [No Spinal Tenderness] : no spinal tenderness [No Joint Swelling] : no joint swelling [Grossly Normal Strength/Tone] : grossly normal strength/tone [No Rash] : no rash [Coordination Grossly Intact] : coordination grossly intact [No Focal Deficits] : no focal deficits [Normal Gait] : normal gait [Deep Tendon Reflexes (DTR)] : deep tendon reflexes were 2+ and symmetric [Normal Affect] : the affect was normal [Normal Insight/Judgement] : insight and judgment were intact [de-identified] : moderately erythematous throat; nasal congestion [de-identified] : m [de-identified] : ventral hernia [FreeTextEntry1] : deferred

## 2023-04-13 ENCOUNTER — APPOINTMENT (OUTPATIENT)
Dept: OTOLARYNGOLOGY | Facility: CLINIC | Age: 73
End: 2023-04-13
Payer: MEDICARE

## 2023-04-13 VITALS
BODY MASS INDEX: 34.32 KG/M2 | HEART RATE: 74 BPM | SYSTOLIC BLOOD PRESSURE: 133 MMHG | DIASTOLIC BLOOD PRESSURE: 72 MMHG | WEIGHT: 206 LBS | HEIGHT: 65 IN

## 2023-04-13 PROCEDURE — 31579 LARYNGOSCOPY TELESCOPIC: CPT

## 2023-04-13 PROCEDURE — 99215 OFFICE O/P EST HI 40 MIN: CPT | Mod: 25

## 2023-04-13 NOTE — ASSESSMENT
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  This was first dx in 2011 and treated with XRT.    Since the last visit he was diagnosed with a left lung cancer which is already resected without need for chemo/XRT.  Patient has recovered well.  PET/CT was done at that time and patient have those results sent to me for review.\par \par Currently, overall patient is stable and doing well.  No new symptoms or concern for recurrence.  Exam is consistent with post XRT changes to the larynx,  today there was a raised white plaque left anterior vocal fold 2 mm.    At this time I am recommending follow-up in 2 months.  Should this persist we will plan for  operative biopsy he knows to follow-up sooner should symptoms worsen or change.  Evidence of supraglottic compression and speech evaluation offered.  Pt would like to observe for now.  Follow up 6 mo.  \par \par -   Follow-up 2 months for repeat evaluation, should there be persistent plaque-like lesion we will plan for OR for biopsy\par - consider speech therapy evaluation at next visit if there is persistent or worsening dysphonia

## 2023-04-13 NOTE — HISTORY OF PRESENT ILLNESS
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 This was first dx in 2011 and treated with XRT. In 2016 and again in 2017 he underwent rebiopsy for suspicious lesions concerning for recurrence, both times negative. Today he notes no issues or changes in terms of chewing, eating or swallowing. No voice changes and no breathing issues. He denies any issues with fevers, night sweats, or unplanned weight loss. No referred otalgia. No new neck masses, or lesions. He actually notes voice improvement. He did recently under go Left partial nephrectomy for clear cell tumor.\par \par Former smoker, quit several years ago.\par -\par Update 5/17/22\par Patient with patient is overall stable and well. No changes to his voice. No issues chewing, eating, or swallowing. No breathing issues. He has noted some laryngitis over the past few days as he has been raising his voice at home but otherwise he has been normal. He denies any fevers, night sweats, weight loss, referred otalgia, new neck masses. He denies any new ear, nose, throat symptoms otherwise.\par - \par Interval History: 11/15/22\par At the time of the last visit I requested a chest x-ray which was not completed. The following month he actually did have a CT chest and was ultimately diagnosed with lung cancer, left upper lobe. The patient underwent resection without need for chemotherapy or XRT. Per the patient he is now cancer free. During that work-up he did complete a PET/CT and this was negative for any disease in the neck.\par \par Patient with patient is overall stable and well. No changes to his voice. No issues chewing, eating, or swallowing. No breathing issues. He denies any fevers, night sweats, weight loss, referred otalgia, new neck masses. He denies any new ear, nose, throat symptoms otherwise. \par - [FreeTextEntry1] : 4/13/23\par Pt overall well and stable. For the past month, he reports sensation of something stuck on the right side of his throat. He feels this when he's swallowing. Denies difficulty chewing, eating or swallowing. No breathing issues. No voice changes. No fevers, night sweats, weight loss.

## 2023-04-13 NOTE — PROCEDURE
[de-identified] : -\par Procedure Note\par \par Pre-operative Diagnosis: hx of left vocal fold cancer\par Post-operative Diagnosis:  small 2 mm circular white plaque anterior left vocal fold, post XRT changes noted.\par Anesthesia: Topical - 1 % Lidocaine/Phenylephrine\par Procedure: Flexible Laryngoscopy with Stroboscopy\par  \par Procedure Details: \par The patient was placed in the sitting position. After decongestant and anesthesia were applied the laryngoscope was passed. The nasal cavities, nasopharynx, oropharynx, hypopharynx, and larynx were all examined. Vocal folds were examined during respiration and phonation. The following findings were noted:\par \par Findings: \par Nose: Septum is midline, turbinates are normal, nasal airways patent, mucosa normal\par Nasopharynx: Adenoids normal, no masses, eustachian tube normal\par Oropharynx: Pharyngeal walls symmetric and without lesion. Tonsils/fossae symmetric\par Hypopharynx: Hypopharynx and pyriform sinuses without lesion. No masses or asymmetry. No pooling of secretions.\par Larynx: Epiglottis and aryepiglottic folds were sharp and crisp bilaterally. Bilateral false vocal folds normal appearance. Airway was widely patent.\par \par Strobe Exam Ratings\par 		\par TVF Appearance: mild edema and scarring, worse on the right post VF,  small 2 mm circular white plaque anterior left vocal fold,\par TVF Mobility: reduced abduction, but airway patent\par Edema/hypertrophy: mild\par Mucus on TVF: normal\par Glottic Closure: adequate\par Mucosal Wave: reduced\par Amplitude of Vibration: reduced\par Phase: symmetric\par Supraglottic Hyperfunction: +supraglottic compression\par Other Findings: none\par \par Condition: Stable. Patient tolerated procedure well.\par \par Complications: None. \par

## 2023-05-23 ENCOUNTER — APPOINTMENT (OUTPATIENT)
Dept: OTOLARYNGOLOGY | Facility: CLINIC | Age: 73
End: 2023-05-23
Payer: MEDICARE

## 2023-05-23 VITALS
SYSTOLIC BLOOD PRESSURE: 140 MMHG | DIASTOLIC BLOOD PRESSURE: 79 MMHG | HEIGHT: 65 IN | TEMPERATURE: 97.2 F | WEIGHT: 206 LBS | HEART RATE: 69 BPM | BODY MASS INDEX: 34.32 KG/M2

## 2023-05-23 PROCEDURE — 31579 LARYNGOSCOPY TELESCOPIC: CPT

## 2023-05-23 PROCEDURE — 99215 OFFICE O/P EST HI 40 MIN: CPT | Mod: 25

## 2023-05-24 ENCOUNTER — LABORATORY RESULT (OUTPATIENT)
Age: 73
End: 2023-05-24

## 2023-05-24 ENCOUNTER — APPOINTMENT (OUTPATIENT)
Dept: INTERNAL MEDICINE | Facility: CLINIC | Age: 73
End: 2023-05-24
Payer: MEDICARE

## 2023-05-24 VITALS
WEIGHT: 201 LBS | BODY MASS INDEX: 33.49 KG/M2 | SYSTOLIC BLOOD PRESSURE: 129 MMHG | HEIGHT: 65 IN | TEMPERATURE: 97.8 F | DIASTOLIC BLOOD PRESSURE: 70 MMHG | RESPIRATION RATE: 14 BRPM | OXYGEN SATURATION: 98 % | HEART RATE: 81 BPM

## 2023-05-24 DIAGNOSIS — C32.0 MALIGNANT NEOPLASM OF GLOTTIS: ICD-10-CM

## 2023-05-24 PROCEDURE — 99214 OFFICE O/P EST MOD 30 MIN: CPT | Mod: 25

## 2023-05-24 PROCEDURE — 36415 COLL VENOUS BLD VENIPUNCTURE: CPT

## 2023-05-24 NOTE — HISTORY OF PRESENT ILLNESS
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 This was first dx in 2011 and treated with XRT. In 2016 and again in 2017 he underwent rebiopsy for suspicious lesions concerning for recurrence, both times negative. Today he notes no issues or changes in terms of chewing, eating or swallowing. No voice changes and no breathing issues. He denies any issues with fevers, night sweats, or unplanned weight loss. No referred otalgia. No new neck masses, or lesions. He actually notes voice improvement. He did recently under go Left partial nephrectomy for clear cell tumor.\par \par Former smoker, quit several years ago.\par -\par Update 5/17/22\par Patient with patient is overall stable and well. No changes to his voice. No issues chewing, eating, or swallowing. No breathing issues. He has noted some laryngitis over the past few days as he has been raising his voice at home but otherwise he has been normal. He denies any fevers, night sweats, weight loss, referred otalgia, new neck masses. He denies any new ear, nose, throat symptoms otherwise.\par - \par Interval History: 11/15/22\par At the time of the last visit I requested a chest x-ray which was not completed. The following month he actually did have a CT chest and was ultimately diagnosed with lung cancer, left upper lobe. The patient underwent resection without need for chemotherapy or XRT. Per the patient he is now cancer free. During that work-up he did complete a PET/CT and this was negative for any disease in the neck.\par \par Patient with patient is overall stable and well. No changes to his voice. No issues chewing, eating, or swallowing. No breathing issues. He denies any fevers, night sweats, weight loss, referred otalgia, new neck masses. He denies any new ear, nose, throat symptoms otherwise. \par -\par 4/13/23\par Pt overall well and stable. For the past month, he reports sensation of something stuck on the right side of his throat. He feels this when he's swallowing. Denies difficulty chewing, eating or swallowing. No breathing issues. No voice changes. No fevers, night sweats, weight loss. \par - [FreeTextEntry1] :  5/23/2023\par Previous visit a vocal lesion was noted and patient presents for surveillance.  No issues chewing, eating, swallowing.  No changes in voice or breathing issues.  No fevers night sweats weight loss or referred otalgia.  No new ear, nose, throat symptoms otherwise.\par

## 2023-05-24 NOTE — ASSESSMENT
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  This was first dx in 2011 and treated with XRT.    Since the last visit he was diagnosed with a left lung cancer which is already resected without need for chemo/XRT.  Patient has recovered well.  PET/CT was done at that time and patient have those results sent to me for review.\par \par Currently, overall patient is stable and doing well.  No new symptoms or concern for recurrence.  Exam is consistent with post XRT changes to the larynx,  today there was a raised white plaque left anterior vocal fold 2 mm.    At this time I am recommending follow-up in 2 months.  Should this persist we will plan for  operative biopsy he knows to follow-up sooner should symptoms worsen or change.  Evidence of supraglottic compression and speech evaluation offered.  Pt would like to observe for now.  Follow up 6 mo.  \par \par 5/23/23: Overall no significant changes in symptoms.  No new ear, nose, throat symptoms.  Laryngoscopy/stroboscopy  improved from previous  with some new signs of irregularity of the right mid fold point of contact as well.   at this time I think it would be prudent to set up for potential OR for surgical excision in September, including suspension [employer] laryngoscopy with excisional biopsy of lesions and KTP laser ablation.  That being said patient will have a preoperative visit in August and if symptoms improve surgery will be canceled.  Risk, benefits and alternatives of surgery were discussed including bleeding, infection, pain, risk of anesthesia, problems chewing or swallowing, changed or worsened voice, chipped teeth, tongue numbness, taste disturbance, referred ear pain, need for further procedures and possible time off of work.\par \par –Plan for OR in September for suspension [employer] laryngoscopy with excisional biopsy of bilateral vocal fold lesions and KTP laser ablation\par – Preoperative clearance\par – Preoperative visit with me in August and if symptoms are not improved or physical exam findings improved surgery will be canceled

## 2023-05-24 NOTE — PROCEDURE
[de-identified] :  -\par Procedure Note\par \par Pre-operative Diagnosis: hx of left vocal fold cancer\par Post-operative Diagnosis: small 2 mm circular white plaque anterior left vocal fold Improved from previous,  also noted some irregularity along the right vocal fold point of contact mid fold, post XRT changes noted.\par Anesthesia: Topical - 1 % Lidocaine/Phenylephrine\par Procedure: Flexible Laryngoscopy with Stroboscopy\par  \par Procedure Details: \par The patient was placed in the sitting position. After decongestant and anesthesia were applied the laryngoscope was passed. The nasal cavities, nasopharynx, oropharynx, hypopharynx, and larynx were all examined. Vocal folds were examined during respiration and phonation. The following findings were noted:\par \par Findings: \par Nose: Septum is midline, turbinates are normal, nasal airways patent, mucosa normal\par Nasopharynx: Adenoids normal, no masses, eustachian tube normal\par Oropharynx: Pharyngeal walls symmetric and without lesion. Tonsils/fossae symmetric\par Hypopharynx: Hypopharynx and pyriform sinuses without lesion. No masses or asymmetry. No pooling of secretions.\par Larynx: Epiglottis and aryepiglottic folds were sharp and crisp bilaterally. Bilateral false vocal folds normal appearance. Airway was widely patent.\par \par Strobe Exam Ratings\par 		\par TVF Appearance: mild edema and scarring, worse on the right post VF, small 2 mm circular white plaque anterior left vocal fold  improved from previous, evidence of some irregularity at the point of contact mid right vocal fold,\par TVF Mobility: reduced abduction, but airway patent\par Edema/hypertrophy: mild\par Mucus on TVF: normal\par Glottic Closure: adequate\par Mucosal Wave: reduced\par Amplitude of Vibration: reduced\par Phase: symmetric\par Supraglottic Hyperfunction: +supraglottic compression\par Other Findings: none\par \par Condition: Stable. Patient tolerated procedure well.\par \par Complications: None.

## 2023-05-28 NOTE — HEALTH RISK ASSESSMENT
[No] : In the past 12 months have you used drugs other than those required for medical reasons? No [No falls in past year] : Patient reported no falls in the past year [0] : 2) Feeling down, depressed, or hopeless: Not at all (0) [Former] : Former [Audit-CScore] : 0 [de-identified] : lightly active [de-identified] : regular [[id number]] : 0

## 2023-05-28 NOTE — HISTORY OF PRESENT ILLNESS
[FreeTextEntry1] : follow-up  [de-identified] : HERMANN JAVIER is a 73 year old male with a PMHx of anxiety, depression, BPH, CAD, kidney cancer s/p partial nephrectomy, CHF, dementia, DVT, DM, HLD, laryngeal cancer, HTN, vocal cord cancer, and thyroid disease who presents today for follow-up and renew his medication. He is feeling okay except for pain right knee. He was seen by orthopedic and was told he would need surgery.\par \par Med: trulicity

## 2023-06-07 ENCOUNTER — RX RENEWAL (OUTPATIENT)
Age: 73
End: 2023-06-07

## 2023-06-13 ENCOUNTER — APPOINTMENT (OUTPATIENT)
Dept: OTOLARYNGOLOGY | Facility: CLINIC | Age: 73
End: 2023-06-13

## 2023-06-16 ENCOUNTER — APPOINTMENT (OUTPATIENT)
Dept: UROLOGY | Facility: CLINIC | Age: 73
End: 2023-06-16
Payer: MEDICARE

## 2023-06-16 VITALS
DIASTOLIC BLOOD PRESSURE: 76 MMHG | TEMPERATURE: 97.3 F | HEART RATE: 74 BPM | SYSTOLIC BLOOD PRESSURE: 136 MMHG | OXYGEN SATURATION: 98 % | RESPIRATION RATE: 14 BRPM

## 2023-06-16 DIAGNOSIS — R97.20 ELEVATED PROSTATE, SPECIFIC ANTIGEN [PSA]: ICD-10-CM

## 2023-06-16 PROCEDURE — 99214 OFFICE O/P EST MOD 30 MIN: CPT

## 2023-06-18 PROBLEM — R97.20 ELEVATED PROSTATE SPECIFIC ANTIGEN (PSA): Status: ACTIVE | Noted: 2022-12-09

## 2023-06-18 NOTE — ASSESSMENT
[FreeTextEntry1] : 72 yo with renal cell cancer\par s/p robotic partial nephrectomy\par \par - elevation in PSA related to recent lung surgery\par with subsequent normalization \par - CT scan abdomen and pelvis\par - f/u in 6 months to re-assess

## 2023-06-18 NOTE — HISTORY OF PRESENT ILLNESS
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

## 2023-06-18 NOTE — PHYSICAL EXAM
[General Appearance - Well Developed] : well developed [General Appearance - Well Nourished] : well nourished [Normal Appearance] : normal appearance [Well Groomed] : well groomed [General Appearance - In No Acute Distress] : no acute distress [Abdomen Soft] : soft [Abdomen Tenderness] : non-tender [Costovertebral Angle Tenderness] : no ~M costovertebral angle tenderness [Edema] : no peripheral edema [] : no respiratory distress [Respiration, Rhythm And Depth] : normal respiratory rhythm and effort [Exaggerated Use Of Accessory Muscles For Inspiration] : no accessory muscle use [Affect] : the affect was normal [Oriented To Time, Place, And Person] : oriented to person, place, and time [Mood] : the mood was normal [Not Anxious] : not anxious [Normal Station and Gait] : the gait and station were normal for the patient's age [No Focal Deficits] : no focal deficits [No Palpable Adenopathy] : no palpable adenopathy

## 2023-06-29 ENCOUNTER — NON-APPOINTMENT (OUTPATIENT)
Age: 73
End: 2023-06-29

## 2023-06-29 ENCOUNTER — APPOINTMENT (OUTPATIENT)
Dept: PULMONOLOGY | Facility: CLINIC | Age: 73
End: 2023-06-29
Payer: MEDICARE

## 2023-06-29 VITALS
BODY MASS INDEX: 33.15 KG/M2 | RESPIRATION RATE: 14 BRPM | TEMPERATURE: 98.1 F | HEIGHT: 65 IN | OXYGEN SATURATION: 97 % | HEART RATE: 76 BPM | DIASTOLIC BLOOD PRESSURE: 65 MMHG | SYSTOLIC BLOOD PRESSURE: 119 MMHG | WEIGHT: 199 LBS

## 2023-06-29 PROCEDURE — 93000 ELECTROCARDIOGRAM COMPLETE: CPT | Mod: NC

## 2023-06-29 PROCEDURE — 99214 OFFICE O/P EST MOD 30 MIN: CPT | Mod: 25

## 2023-06-29 NOTE — REASON FOR VISIT
[Follow-Up] : a follow-up visit [Lung Cancer] : lung cancer [Spouse] : spouse [TextBox_44] : Pre op clearance

## 2023-06-29 NOTE — HISTORY OF PRESENT ILLNESS
[Former] : former [Never] : never [TextBox_4] : Patient is a 73-year-old male past medical history significant for anxiety depression BPH coronary artery disease renal cancer status post partial nephrectomy congestive heart failure history of DVT diabetes and laryngeal CA who presents today for preoperative clearance.  The patient is scheduled for hernia repair

## 2023-06-29 NOTE — ASSESSMENT
[FreeTextEntry1] : In summary the patient is a 73-year-old male past medical history significant for hypertension high cholesterol renal cell carcinoma status post resection laryngeal cell cancer who is now scheduled for herniorrhaphy.  Patient's physical exam is within normal limits.  He had a normal chest x-ray a few months ago but repeat is pending.  Electrocardiogram revealed normal acute ischemic changes.\par \par The patient is currently in his usual state of health and is medically cleared for this elective surgical procedure

## 2023-06-30 LAB
ALBUMIN SERPL ELPH-MCNC: 4.3 G/DL
ALP BLD-CCNC: 137 U/L
ALT SERPL-CCNC: 20 U/L
ANION GAP SERPL CALC-SCNC: 16 MMOL/L
APPEARANCE: CLEAR
AST SERPL-CCNC: 23 U/L
BACTERIA: NEGATIVE /HPF
BILIRUB SERPL-MCNC: 0.3 MG/DL
BILIRUBIN URINE: NEGATIVE
BLOOD URINE: NEGATIVE
BUN SERPL-MCNC: 49 MG/DL
CALCIUM SERPL-MCNC: 9.6 MG/DL
CAST: 2 /LPF
CHLORIDE SERPL-SCNC: 105 MMOL/L
CO2 SERPL-SCNC: 24 MMOL/L
COLOR: YELLOW
CREAT SERPL-MCNC: 1.96 MG/DL
EGFR: 35 ML/MIN/1.73M2
EPITHELIAL CELLS: 0 /HPF
ESTIMATED AVERAGE GLUCOSE: 148 MG/DL
GLUCOSE QUALITATIVE U: >=1000 MG/DL
GLUCOSE SERPL-MCNC: 136 MG/DL
HBA1C MFR BLD HPLC: 6.8 %
HCT VFR BLD CALC: 38.9 %
HGB BLD-MCNC: 12.3 G/DL
INR PPP: 1 RATIO
KETONES URINE: NEGATIVE MG/DL
LEUKOCYTE ESTERASE URINE: NEGATIVE
MCHC RBC-ENTMCNC: 28.6 PG
MCHC RBC-ENTMCNC: 31.6 GM/DL
MCV RBC AUTO: 90.5 FL
MICROSCOPIC-UA: NORMAL
NITRITE URINE: NEGATIVE
PH URINE: 5.5
PLATELET # BLD AUTO: 187 K/UL
POTASSIUM SERPL-SCNC: 4.8 MMOL/L
PROT SERPL-MCNC: 6.6 G/DL
PROTEIN URINE: NORMAL MG/DL
PSA SERPL-MCNC: 3.75 NG/ML
PT BLD: 11.7 SEC
RBC # BLD: 4.3 M/UL
RBC # FLD: 16 %
RED BLOOD CELLS URINE: 0 /HPF
SODIUM SERPL-SCNC: 145 MMOL/L
SPECIFIC GRAVITY URINE: 1.02
TSH SERPL-ACNC: 2.84 UIU/ML
UROBILINOGEN URINE: 0.2 MG/DL
WBC # FLD AUTO: 7.49 K/UL
WHITE BLOOD CELLS URINE: 2 /HPF

## 2023-07-10 ENCOUNTER — APPOINTMENT (OUTPATIENT)
Dept: INTERNAL MEDICINE | Facility: CLINIC | Age: 73
End: 2023-07-10

## 2023-07-18 ENCOUNTER — RX RENEWAL (OUTPATIENT)
Age: 73
End: 2023-07-18

## 2023-07-24 PROBLEM — K29.70 GASTRITIS, UNSPECIFIED, WITHOUT BLEEDING: Chronic | Status: ACTIVE | Noted: 2023-07-19

## 2023-07-24 PROBLEM — N28.89 OTHER SPECIFIED DISORDERS OF KIDNEY AND URETER: Chronic | Status: ACTIVE | Noted: 2023-07-19

## 2023-07-24 PROBLEM — M10.9 GOUT, UNSPECIFIED: Chronic | Status: ACTIVE | Noted: 2023-07-19

## 2023-07-25 LAB
ALBUMIN MFR SERPL ELPH: 59.7 %
ALBUMIN SERPL ELPH-MCNC: 4.2 G/DL
ALBUMIN SERPL-MCNC: 4.1 G/DL
ALBUMIN/GLOB SERPL: 1.5 RATIO
ALP BLD-CCNC: 117 U/L
ALPHA1 GLOB MFR SERPL ELPH: 4.2 %
ALPHA1 GLOB SERPL ELPH-MCNC: 0.3 G/DL
ALPHA2 GLOB MFR SERPL ELPH: 12 %
ALPHA2 GLOB SERPL ELPH-MCNC: 0.8 G/DL
ALT SERPL-CCNC: 14 U/L
ANION GAP SERPL CALC-SCNC: 16 MMOL/L
AST SERPL-CCNC: 19 U/L
B-GLOBULIN MFR SERPL ELPH: 10.9 %
B-GLOBULIN SERPL ELPH-MCNC: 0.7 G/DL
BILIRUB SERPL-MCNC: 0.4 MG/DL
BUN SERPL-MCNC: 47 MG/DL
CALCIUM SERPL-MCNC: 9 MG/DL
CHLORIDE SERPL-SCNC: 100 MMOL/L
CO2 SERPL-SCNC: 22 MMOL/L
CREAT SERPL-MCNC: 1.85 MG/DL
EGFR: 38 ML/MIN/1.73M2
FERRITIN SERPL-MCNC: 18 NG/ML
FOLATE SERPL-MCNC: 10.2 NG/ML
GAMMA GLOB FLD ELPH-MCNC: 0.9 G/DL
GAMMA GLOB MFR SERPL ELPH: 13.2 %
GLUCOSE SERPL-MCNC: 218 MG/DL
INTERPRETATION SERPL IEP-IMP: NORMAL
IRON SATN MFR SERPL: 20 %
IRON SERPL-MCNC: 64 UG/DL
M PROTEIN MFR SERPL ELPH: NORMAL
MONOCLON BAND OBS SERPL: NORMAL
POTASSIUM SERPL-SCNC: 4.7 MMOL/L
PROT SERPL-MCNC: 6.8 G/DL
SODIUM SERPL-SCNC: 139 MMOL/L
TIBC SERPL-MCNC: 321 UG/DL
UIBC SERPL-MCNC: 257 UG/DL
VIT B12 SERPL-MCNC: 481 PG/ML

## 2023-07-26 ENCOUNTER — TRANSCRIPTION ENCOUNTER (OUTPATIENT)
Age: 73
End: 2023-07-26

## 2023-07-27 ENCOUNTER — OUTPATIENT (OUTPATIENT)
Dept: OUTPATIENT SERVICES | Facility: HOSPITAL | Age: 73
LOS: 1 days | Discharge: HOME | End: 2023-07-27
Payer: MEDICARE

## 2023-07-27 ENCOUNTER — TRANSCRIPTION ENCOUNTER (OUTPATIENT)
Age: 73
End: 2023-07-27

## 2023-07-27 VITALS
HEART RATE: 68 BPM | DIASTOLIC BLOOD PRESSURE: 77 MMHG | SYSTOLIC BLOOD PRESSURE: 147 MMHG | HEIGHT: 62 IN | TEMPERATURE: 97 F | WEIGHT: 199.08 LBS | RESPIRATION RATE: 16 BRPM | OXYGEN SATURATION: 97 %

## 2023-07-27 DIAGNOSIS — Z96.652 PRESENCE OF LEFT ARTIFICIAL KNEE JOINT: Chronic | ICD-10-CM

## 2023-07-27 DIAGNOSIS — Z98.89 OTHER SPECIFIED POSTPROCEDURAL STATES: Chronic | ICD-10-CM

## 2023-07-27 DIAGNOSIS — Z98.890 OTHER SPECIFIED POSTPROCEDURAL STATES: Chronic | ICD-10-CM

## 2023-07-27 DIAGNOSIS — Z95.5 PRESENCE OF CORONARY ANGIOPLASTY IMPLANT AND GRAFT: Chronic | ICD-10-CM

## 2023-07-27 LAB
GLUCOSE BLDC GLUCOMTR-MCNC: 102 MG/DL — HIGH (ref 70–99)
GLUCOSE BLDC GLUCOMTR-MCNC: 121 MG/DL — HIGH (ref 70–99)
GLUCOSE BLDC GLUCOMTR-MCNC: 132 MG/DL — HIGH (ref 70–99)

## 2023-07-27 PROCEDURE — 88302 TISSUE EXAM BY PATHOLOGIST: CPT | Mod: 26

## 2023-07-27 DEVICE — MESH HERNIA VENTRALEX ST MEDIUM 2.5": Type: IMPLANTABLE DEVICE | Site: ABDOMEN | Status: FUNCTIONAL

## 2023-07-27 RX ORDER — DEXTROSE 50 % IN WATER 50 %
25 SYRINGE (ML) INTRAVENOUS ONCE
Refills: 0 | Status: DISCONTINUED | OUTPATIENT
Start: 2023-07-27 | End: 2023-07-28

## 2023-07-27 RX ORDER — TAMSULOSIN HYDROCHLORIDE 0.4 MG/1
0.4 CAPSULE ORAL AT BEDTIME
Refills: 0 | Status: DISCONTINUED | OUTPATIENT
Start: 2023-07-27 | End: 2023-07-28

## 2023-07-27 RX ORDER — BENZOCAINE AND MENTHOL 5; 1 G/100ML; G/100ML
1 LIQUID ORAL
Refills: 0 | Status: DISCONTINUED | OUTPATIENT
Start: 2023-07-27 | End: 2023-07-28

## 2023-07-27 RX ORDER — ONDANSETRON 8 MG/1
4 TABLET, FILM COATED ORAL EVERY 6 HOURS
Refills: 0 | Status: DISCONTINUED | OUTPATIENT
Start: 2023-07-27 | End: 2023-07-28

## 2023-07-27 RX ORDER — VENLAFAXINE HCL 75 MG
150 CAPSULE, EXT RELEASE 24 HR ORAL DAILY
Refills: 0 | Status: DISCONTINUED | OUTPATIENT
Start: 2023-07-28 | End: 2023-07-28

## 2023-07-27 RX ORDER — DEXTROSE 50 % IN WATER 50 %
12.5 SYRINGE (ML) INTRAVENOUS ONCE
Refills: 0 | Status: DISCONTINUED | OUTPATIENT
Start: 2023-07-27 | End: 2023-07-28

## 2023-07-27 RX ORDER — GLUCAGON INJECTION, SOLUTION 0.5 MG/.1ML
1 INJECTION, SOLUTION SUBCUTANEOUS ONCE
Refills: 0 | Status: DISCONTINUED | OUTPATIENT
Start: 2023-07-27 | End: 2023-07-28

## 2023-07-27 RX ORDER — HEPARIN SODIUM 5000 [USP'U]/ML
5000 INJECTION INTRAVENOUS; SUBCUTANEOUS EVERY 8 HOURS
Refills: 0 | Status: DISCONTINUED | OUTPATIENT
Start: 2023-07-27 | End: 2023-07-28

## 2023-07-27 RX ORDER — OXYCODONE AND ACETAMINOPHEN 5; 325 MG/1; MG/1
1 TABLET ORAL
Qty: 12 | Refills: 0
Start: 2023-07-27 | End: 2023-07-29

## 2023-07-27 RX ORDER — LANOLIN ALCOHOL/MO/W.PET/CERES
3 CREAM (GRAM) TOPICAL AT BEDTIME
Refills: 0 | Status: DISCONTINUED | OUTPATIENT
Start: 2023-07-27 | End: 2023-07-28

## 2023-07-27 RX ORDER — ATORVASTATIN CALCIUM 80 MG/1
80 TABLET, FILM COATED ORAL AT BEDTIME
Refills: 0 | Status: DISCONTINUED | OUTPATIENT
Start: 2023-07-27 | End: 2023-07-28

## 2023-07-27 RX ORDER — SODIUM CHLORIDE 9 MG/ML
1000 INJECTION, SOLUTION INTRAVENOUS
Refills: 0 | Status: DISCONTINUED | OUTPATIENT
Start: 2023-07-27 | End: 2023-07-28

## 2023-07-27 RX ORDER — AMLODIPINE BESYLATE 2.5 MG/1
10 TABLET ORAL DAILY
Refills: 0 | Status: DISCONTINUED | OUTPATIENT
Start: 2023-07-27 | End: 2023-07-28

## 2023-07-27 RX ORDER — DEXTROSE 50 % IN WATER 50 %
15 SYRINGE (ML) INTRAVENOUS ONCE
Refills: 0 | Status: DISCONTINUED | OUTPATIENT
Start: 2023-07-27 | End: 2023-07-28

## 2023-07-27 RX ORDER — DOCUSATE SODIUM 100 MG
1 CAPSULE ORAL
Qty: 21 | Refills: 0
Start: 2023-07-27 | End: 2023-08-02

## 2023-07-27 RX ORDER — INSULIN LISPRO 100/ML
VIAL (ML) SUBCUTANEOUS
Refills: 0 | Status: DISCONTINUED | OUTPATIENT
Start: 2023-07-27 | End: 2023-07-28

## 2023-07-27 RX ORDER — ACETAMINOPHEN 500 MG
650 TABLET ORAL EVERY 6 HOURS
Refills: 0 | Status: DISCONTINUED | OUTPATIENT
Start: 2023-07-27 | End: 2023-07-28

## 2023-07-27 RX ORDER — OXYCODONE AND ACETAMINOPHEN 5; 325 MG/1; MG/1
1 TABLET ORAL EVERY 6 HOURS
Refills: 0 | Status: DISCONTINUED | OUTPATIENT
Start: 2023-07-27 | End: 2023-07-28

## 2023-07-27 RX ORDER — PANTOPRAZOLE SODIUM 20 MG/1
40 TABLET, DELAYED RELEASE ORAL
Refills: 0 | Status: DISCONTINUED | OUTPATIENT
Start: 2023-07-27 | End: 2023-07-28

## 2023-07-27 RX ADMIN — TAMSULOSIN HYDROCHLORIDE 0.4 MILLIGRAM(S): 0.4 CAPSULE ORAL at 22:04

## 2023-07-27 RX ADMIN — BENZOCAINE AND MENTHOL 1 LOZENGE: 5; 1 LIQUID ORAL at 22:03

## 2023-07-27 RX ADMIN — SODIUM CHLORIDE 130 MILLILITER(S): 9 INJECTION, SOLUTION INTRAVENOUS at 19:46

## 2023-07-27 RX ADMIN — HEPARIN SODIUM 5000 UNIT(S): 5000 INJECTION INTRAVENOUS; SUBCUTANEOUS at 19:45

## 2023-07-27 RX ADMIN — ATORVASTATIN CALCIUM 80 MILLIGRAM(S): 80 TABLET, FILM COATED ORAL at 22:04

## 2023-07-27 NOTE — H&P ADULT - HISTORY OF PRESENT ILLNESS
73M PMHx CAD (2 stents), DM, dementia, depression, DVT, GERD, BPH, lung cancer s/p lung surgery presents for open repair of incarcerated ventral hernia with mesh, including bilateral component separation. Pt has no acute complaints today. Will proceed to OR as planned.  73M PMHx CAD (2 stents), DM, dementia, depression, DVT, GERD, BPH, renal cancer s/p partial nephrectomy, lung cancer s/p lung surgery presents for open repair of incarcerated ventral hernia with mesh, including bilateral component separation. Pt has no acute complaints today. Will proceed to OR as planned.

## 2023-07-27 NOTE — ASU PATIENT PROFILE, ADULT - FALL HARM RISK - HARM RISK INTERVENTIONS

## 2023-07-27 NOTE — ASU DISCHARGE PLAN (ADULT/PEDIATRIC) - NS MD DC FALL RISK RISK
For information on Fall & Injury Prevention, visit: [email or web address] OR  [email or web address] OR  https://www.cdc.gov/steadi/patient.html

## 2023-07-27 NOTE — CONSULT NOTE ADULT - ASSESSMENT
MrIona Brunson is a 73/M with HTN, DM type 2 not on insulin, HLD, Hyperthyroidism, BPH, chronic anemia, CAD,  history of lung CA, gout, gastritis/GERD, Anxiety/Depression who denies history of CHF or DVT with a 5-6 month history of ventral hernia.     BP and HR range:  HR: 54 (07-27-23 @ 16:32) (53 - 68)  BP: 138/64 (07-27-23 @ 16:32) (128/61 - 147/77)    Recommendations:    HTN - Resume Amlodipine, Olmesartan-HCTZ once cleared to resume post op by primary surgery team  HLD/CAD - Resume Zetia, Plavix and Atorvastatin once cleared to take PO and once cleared to resume Plavix by surgery   Mr. Gorge Brunson is a 73/M with HTN, DM type 2 not on insulin, HLD, Hyperthyroidism, BPH, chronic anemia, CAD,  history of lung CA, gout, gastritis/GERD, Anxiety/Depression who denies history of CHF or DVT with a 5-6 month history of ventral hernia.     BP and HR range:  HR: 54 (07-27-23 @ 16:32) (53 - 68)  BP: 138/64 (07-27-23 @ 16:32) (128/61 - 147/77)    Recommendations:    HTN - Resume Amlodipine, Olmesartan-HCTZ once cleared to resume post op by primary surgery team  HLD/CAD - Resume Zetia, Plavix and Atorvastatin once cleared to take PO and once cleared to resume Plavix by surgery  GERD/Gastritis - place on PPI, can use Pantoprazole in the hospital and switch back to Omeprazole on DC  Hyperthyroidism - on Methimazole  Anxiety/Depression - on Effexor  DM type 2 - place on correctional insulin and POC glucose monitoring with hypoglycemia protocol in the hospital and resume oral hypoglycemics and Trulicity on DC  Chronic anemia - post op labs per surgery  BPH - resume Flomax and watch out for urinary retention  Ventral hernia s/p ventral hernia repair - post op care per surgery, mobilize, provide adequate analgesia, incentive spirometry    Thank you for allowing us to participate in the care of your patient. Feel free to reach out for any questions.

## 2023-07-27 NOTE — PRE-OP CHECKLIST - IDENTIFICATION BAND VERIFIED
Anticoagulation Episode Summary     Current INR goal:   2.0-3.0   TTR:   87.6 % (2.1 y)   Next INR check:   1/16/2019   INR from last check:   2.1 (12/5/2018)   Weekly max warfarin dose:      Target end date:      INR check location:      Preferred lab:      Send INR reminders to:   Message [employer] (OPEN ENROLLMENT) [place] [place] [facility]    Indications    Long-term (current) use of anticoagulants [Z79.01]  Chronic atrial fibrillation (CMS/HCC) [I48.2]  Warfarin anticoagulation [Z79.01]  Atrial fibrillation  unspecified type (CMS/HCC) [I48.91]  Encounter for current long-term use of anticoagulants [Z79.01]           Comments:            Anticoagulation Care Providers     Provider Role Specialty Phone number    Laron Shipman MD Referring Internal Medicine [phone]          
done

## 2023-07-27 NOTE — ASU PATIENT PROFILE, ADULT - NSICDXPASTMEDICALHX_GEN_ALL_CORE_FT
PAST MEDICAL HISTORY:  Acute adjustment disorder with mixed anxiety and depressed mood     Anemia     Anxiety and depression     BPH (benign prostatic hypertrophy)     CAD (coronary artery disease)     Current use of long term anticoagulation     Dementia     Depression     DM (diabetes mellitus) type 2    DVT, lower extremity     Gastritis     GERD (gastroesophageal reflux disease)     Gout     H/O CHF     H/O: gout     History of BPH     History of throat cancer     HLD (hyperlipidemia)     HTN (hypertension)     Left kidney mass     Lower back pain radiating to right knee    Lung cancer     Neuropathy

## 2023-07-27 NOTE — ASU DISCHARGE PLAN (ADULT/PEDIATRIC) - CARE PROVIDER_API CALL
Tanja Vasquez [facility]  Surgery  [address] [place] [address], [address] [place] [address]  Phone: [phone]  Fax: [phone]  Follow Up Time: 1 week

## 2023-07-27 NOTE — H&P ADULT - NSHPPHYSICALEXAM_GEN_ALL_CORE
T(C): 36.2 (07-27-23 @ 12:41), Max: 36.2 (07-27-23 @ 11:54)  HR: 68 (07-27-23 @ 12:41) (68 - 68)  BP: 147/77 (07-27-23 @ 12:41) (147/77 - 147/77)  RR: 16 (07-27-23 @ 12:41) (16 - 16)  SpO2: 97% (07-27-23 @ 12:41) (97% - 97%)    GENERAL: NAD, Resting comfortably in bed, awake, opens eyes spontaneously  HEENT: NCAT, MMM, Normal conjunctiva, PERRL  RESP: Nonlabored breathing, No respiratory distress  CARD: Normal rate, Normal peripheral perfusion  GI: Soft, ND, NT, No guarding, No rebound tenderness  EXTREM: WWP, No edema, No gross deformity of extremities  SKIN: No rashes, no lesions  NEURO: AAOx3, No focal motor or sensory deficits  PSYCH: Affect and characteristics of appearance, verbalizations, and behaviors are appropriate T(C): 36.2 (07-27-23 @ 12:41), Max: 36.2 (07-27-23 @ 11:54)  HR: 68 (07-27-23 @ 12:41) (68 - 68)  BP: 147/77 (07-27-23 @ 12:41) (147/77 - 147/77)  RR: 16 (07-27-23 @ 12:41) (16 - 16)  SpO2: 97% (07-27-23 @ 12:41) (97% - 97%)    GENERAL: NAD, Resting comfortably in bed, awake, opens eyes spontaneously  HEENT: NCAT, MMM, Normal conjunctiva, PERRL  RESP: Nonlabored breathing, No respiratory distress  CARD: Normal rate, Normal peripheral perfusion  GI: Soft, ND, NT, No guarding, No rebound tenderness, 10 x 5 cm ventral hernia while standinig  EXTREM: WWP, No edema, No gross deformity of extremities  SKIN: No rashes, no lesions  NEURO: AAOx3, No focal motor or sensory deficits  PSYCH: Affect and characteristics of appearance, verbalizations, and behaviors are appropriate

## 2023-07-27 NOTE — BRIEF OPERATIVE NOTE - NSICDXBRIEFPROCEDURE_GEN_ALL_CORE_FT
PROCEDURES:  Repair of incisional or ventral hernia using component separation technique 27-Jul-2023 15:18:51  Noe Pires

## 2023-07-27 NOTE — CONSULT NOTE ADULT - CONSULT REQUESTED BY NAME
Hpi Title: Evaluation of Skin Lesions Have Your Spot(S) Been Treated In The Past?: has not been treated Dr. Tanja Vasquez

## 2023-07-27 NOTE — CONSULT NOTE ADULT - SUBJECTIVE AND OBJECTIVE BOX
GORGE JAVIER  73y/Male    Patient is a 73y old  Male who presents with a chief complaint of ventral hernia.    Mr. Gorge Javier is a 73/M with HTN, DM type 2 not on insulin, HLD, Hyperthyroidism, BPH, chronic anemia, CAD,  history of lung CA, gout, gastritis/GERD, Anxiety/Depression who denies history of CHF or DVT with a 5-6 month history of ventral hernia.     PAST MEDICAL & SURGICAL HISTORY:  HTN (hypertension)  DM (diabetes mellitus) type 2  Neuropathy  History of throat cancer  CAD (coronary artery disease)  HLD (hyperlipidemia)  Depression  GERD (gastroesophageal reflux disease)  BPH (benign prostatic hypertrophy)  Gout  Gastritis  Left kidney mass  Lung cancer  H/O: gout  BPH  Anemia  Anxiety and depression  H/O total knee replacement, left  S/P arthroscopy of knee 2016  H/O heart artery stent x2  History of laryngoscopy  History of lung surgery 2022    PERSONAL SOCIAL HISTORY:  Former smoker, stopped decades ago  Denies EtOH use  Walks unassisted    FAMILY HISTORY:  Denies premature atherosclerosis in the family    HOME MEDICATIONS:  Based on EHR list  amLODIPine 10 mg oral tablet	  Effexor  mg oral capsule, extended release  atorvastatin 80 mg oral tablet  Zetia 10 mg oral tablet	  Flomax 0.4 mg oral capsule	  Xanax 1 mg oral tablet	  omeprazole 40 mg oral delayed release capsule  olmesartan-hydrochlorothiazide 40 mg-12.5 mg oral tablet  Jardiance 25 mg oral tablet  Plavix 75 mg oral tablet  metFORMIN 500 mg oral tablet	  methIMAzole 5 mg oral tablet	  Trulicity Pen 1.5 mg/0.5 mL subcutaneous solution	    MEDICATIONS  (STANDING):  amLODIPine   Tablet 10 milliGRAM(s) Oral daily  atorvastatin 80 milliGRAM(s) Oral at bedtime  dextrose 5%. 1000 milliLiter(s) (50 mL/Hr) IV Continuous <Continuous>  dextrose 5%. 1000 milliLiter(s) (100 mL/Hr) IV Continuous <Continuous>  dextrose 50% Injectable 25 Gram(s) IV Push once  dextrose 50% Injectable 12.5 Gram(s) IV Push once  dextrose 50% Injectable 25 Gram(s) IV Push once  glucagon  Injectable 1 milliGRAM(s) IntraMuscular once  heparin   Injectable 5000 Unit(s) SubCutaneous every 8 hours  insulin lispro (ADMELOG) corrective regimen sliding scale   SubCutaneous Before meals and at bedtime  lactated ringers. 1000 milliLiter(s) (130 mL/Hr) IV Continuous <Continuous>  pantoprazole    Tablet 40 milliGRAM(s) Oral before breakfast  tamsulosin 0.4 milliGRAM(s) Oral at bedtime    MEDICATIONS  (PRN):  acetaminophen     Tablet .. 650 milliGRAM(s) Oral every 6 hours PRN Mild Pain (1 - 3)  dextrose Oral Gel 15 Gram(s) Oral once PRN Blood Glucose LESS THAN 70 milliGRAM(s)/deciliter  ondansetron Injectable 4 milliGRAM(s) IV Push every 6 hours PRN Nausea  oxycodone    5 mG/acetaminophen 325 mG 2 Tablet(s) Oral every 6 hours PRN Severe Pain (7 - 10)  oxycodone    5 mG/acetaminophen 325 mG 1 Tablet(s) Oral every 6 hours PRN Moderate Pain (4 - 6)    REVIEW OF SYSTEMS:  CONSTITUTIONAL: No fever, weight loss, or fatigue  EYES: No eye pain, visual disturbances, or discharge  ENMT:  No sinus or throat pain  NECK: No pain or stiffness  RESPIRATORY: No cough, wheezing, chills or hemoptysis; No shortness of breath  CARDIOVASCULAR: No chest pain, palpitations, dizziness, or leg swelling  GASTROINTESTINAL: mild abdominal discomfort from surgery  GENITOURINARY: No dysuria  NEUROLOGICAL: No headaches  SKIN: No itching, burning, rashes   LYMPH NODES: No enlarged glands  ENDOCRINE: No heat or cold intolerance  MUSCULOSKELETAL: No joint pain or swelling  PSYCHIATRIC: No depression  HEME/LYMPH: No easy bruising  ALLERY AND IMMUNOLOGIC: No hives     T(C): 35.8 (07-27-23 @ 15:32), Max: 36.2 (07-27-23 @ 11:54)  HR: 54 (07-27-23 @ 16:32) (53 - 68)  BP: 138/64 (07-27-23 @ 16:32) (128/61 - 147/77)  RR: 18 (07-27-23 @ 16:32) (10 - 20)  SpO2: 98% (07-27-23 @ 16:32) (97% - 100%)  Wt(kg): --Vital Signs Last 24 Hrs  T(C): 35.8 (27 Jul 2023 15:32), Max: 36.2 (27 Jul 2023 11:54)  T(F): 96.5 (27 Jul 2023 15:32), Max: 97.1 (27 Jul 2023 11:54)  HR: 54 (27 Jul 2023 16:32) (53 - 68)  BP: 138/64 (27 Jul 2023 16:32) (128/61 - 147/77)  BP(mean): 92 (27 Jul 2023 16:32) (88 - 94)  RR: 18 (27 Jul 2023 16:32) (10 - 20)  SpO2: 98% (27 Jul 2023 16:32) (97% - 100%)    Parameters below as of 27 Jul 2023 16:32  Patient On (Oxygen Delivery Method): nasal cannula  O2 Flow (L/min): 2    Oxygen Saturation Index= Unable to calculate   [Based on FiO2 = Unknown, SpO2 = 98(07/27/2023 16:32), MAP = Unknown]  Daily Height in cm: 157.48 (27 Jul 2023 12:41)    Daily     PHYSICAL EXAM:  GENERAL: NAD  HEAD:  Atraumatic, Normocephalic  EYES: EOMI, PERRLA, conjunctiva and sclera clear  ENMT: Moist mucous membranes  NECK: Supple, No JVD  NERVOUS SYSTEM:  Alert & Oriented X3, Good concentration; Nonfocal exam  CHEST/LUNG: Clear to percussion bilaterally  HEART: Regular rate and rhythm  ABDOMEN:  Vertical incision clean and dry; Soft, Nontender, Bowel sounds present  EXTREMITIES:  2+ Peripheral Pulses, No clubbing, cyanosis, or edema  SKIN: No rashes  PSYCH: Normal mood and affect    CAPILLARY BLOOD GLUCOSE    POCT Blood Glucose.: 121 mg/dL (27 Jul 2023 15:46)  POCT Blood Glucose.: 132 mg/dL (27 Jul 2023 12:17)    Consultant(s) Notes Reviewed:  [x ] YES  [ ] NO  Care Discussed with Consultants/Other Providers [ x] YES  [ ] NO    LABS:  No labs available to review

## 2023-07-27 NOTE — ASU DISCHARGE PLAN (ADULT/PEDIATRIC) - ASU DC SPECIAL INSTRUCTIONSFT
General Discharge Instructions:  Please resume all regular home medications unless specifically advised not to take a particular medication. Also, please take any new medications as prescribed.  You have been prescribed percocet. If you take it for pain, you must take the entire 7 day course of colace as well.   Please get plenty of rest, continue to ambulate several times per day, and drink adequate amounts of fluids. Avoid lifting weights greater than 5-10 lbs until you follow-up with your surgeon, who will instruct you further regarding activity restrictions.  Avoid driving or operating heavy machinery while taking pain medications.  Please follow-up with your surgeon and Primary Care Provider (PCP) as advised.    Incision Care:  *Please call your doctor or nurse practitioner if you have increased pain, swelling, redness, or drainage from the incision site.  *Avoid swimming and baths until your follow-up appointment.  *You may shower, and wash surgical incisions with a mild soap and warm water. Gently pat the area dry.    Warning Signs:  Please call your doctor or nurse practitioner if you experience the following:  *You experience new chest pain, pressure, squeezing or tightness.  *New or worsening cough, shortness of breath, or wheeze.  *If you are vomiting and cannot keep down fluids or your medications.  *You are getting dehydrated due to continued vomiting, diarrhea, or other reasons. Signs of dehydration include dry mouth, rapid heartbeat, or feeling dizzy or faint when standing.  *You see blood or dark/black material when you vomit or have a bowel movement.  *You experience burning when you urinate, have blood in your urine, or experience a discharge.  *Your pain is not improving within 8-12 hours or is not gone within 24 hours. Call or return immediately if your pain is getting worse, changes location, or moves to your chest or back.  *You have shaking chills, or fever greater than 101.5 degrees Fahrenheit or 38 degrees Celsius.  *Any change in your symptoms, or any new symptoms that concern you.

## 2023-07-27 NOTE — CHART NOTE - NSCHARTNOTEFT_GEN_A_CORE
POST-OPERATIVE NOTE    Procedure: Ventral Hernia repair, open    Diagnosis/Indication: Ventral hernia    Surgeon: Hanna Vasquez    S:   Pt seen and examined by resident  Has no complaints.   Denies CP, SOB, LORENZO, calf tenderness, nausea, vomiting.  Pain controlled with medication.    O:  T(C): 35.8 (07-27-23 @ 15:32), Max: 35.8 (07-27-23 @ 15:32)  T(F): 96.5 (07-27-23 @ 15:32), Max: 96.5 (07-27-23 @ 15:32)  HR: 54 (07-27-23 @ 16:32) (53 - 62)  BP: 138/64 (07-27-23 @ 16:32) (128/61 - 146/63)  RR: 18 (07-27-23 @ 16:32) (10 - 20)  SpO2: 98% (07-27-23 @ 16:32) (98% - 100%)  Wt(kg): --        Gen: NAD, resting comfortably in bed  C/V: NSR  Pulm: Nonlabored breathing, no respiratory distress  Abd: soft, non-tender, non-distended obese abdomen. MIdline vertical incision with dermabond CDI. Pain free on palpation of incision.   Extrem: WWP, no calf edema or tenderness, SCDs in place    Plan:  Reg Diet  Encourage PO   OOB/Ambulate  DC after 8pm

## 2023-07-27 NOTE — BRIEF OPERATIVE NOTE - OPERATION/FINDINGS
Procedure: Open ventral incisional hernia repair with mesh    Description: Left paramedian incision. 4cm fascial defect. Peritoneum opened to create pockets for preperitoneal mesh, peritoneum closed with 2-0 PDS. Medium ventralex st mesh placed. Fascia closed with interrupted 0-novofil. Skin closed primarily.

## 2023-07-27 NOTE — ASU PATIENT PROFILE, ADULT - NSICDXPASTSURGICALHX_GEN_ALL_CORE_FT
PAST SURGICAL HISTORY:  H/O heart artery stent x2    H/O total knee replacement, left     History of laryngoscopy     History of lung surgery 2022    S/P arthroscopy of knee 2016

## 2023-07-28 ENCOUNTER — TRANSCRIPTION ENCOUNTER (OUTPATIENT)
Age: 73
End: 2023-07-28

## 2023-07-28 VITALS
TEMPERATURE: 98 F | DIASTOLIC BLOOD PRESSURE: 72 MMHG | RESPIRATION RATE: 18 BRPM | SYSTOLIC BLOOD PRESSURE: 145 MMHG | HEART RATE: 95 BPM | OXYGEN SATURATION: 96 %

## 2023-07-28 LAB
A1C WITH ESTIMATED AVERAGE GLUCOSE RESULT: 6.7 % — HIGH (ref 4–5.6)
ESTIMATED AVERAGE GLUCOSE: 146 MG/DL — HIGH (ref 68–114)
GLUCOSE BLDC GLUCOMTR-MCNC: 110 MG/DL — HIGH (ref 70–99)
HCV AB S/CO SERPL IA: 0.04 S/CO — SIGNIFICANT CHANGE UP
HCV AB SERPL-IMP: SIGNIFICANT CHANGE UP

## 2023-07-28 PROCEDURE — 83036 HEMOGLOBIN GLYCOSYLATED A1C: CPT

## 2023-07-28 PROCEDURE — 86803 HEPATITIS C AB TEST: CPT

## 2023-07-28 PROCEDURE — C1781: CPT

## 2023-07-28 PROCEDURE — 36415 COLL VENOUS BLD VENIPUNCTURE: CPT

## 2023-07-28 PROCEDURE — 82962 GLUCOSE BLOOD TEST: CPT

## 2023-07-28 PROCEDURE — 49594 RPR AA HRN 1ST 3-10 NCR/STRN: CPT

## 2023-07-28 PROCEDURE — 88302 TISSUE EXAM BY PATHOLOGIST: CPT

## 2023-07-28 RX ADMIN — HEPARIN SODIUM 5000 UNIT(S): 5000 INJECTION INTRAVENOUS; SUBCUTANEOUS at 04:35

## 2023-07-28 RX ADMIN — PANTOPRAZOLE SODIUM 40 MILLIGRAM(S): 20 TABLET, DELAYED RELEASE ORAL at 07:03

## 2023-07-28 NOTE — DISCHARGE NOTE NURSING/CASE MANAGEMENT/SOCIAL WORK - NSDCPEFALRISK_GEN_ALL_CORE
For information on Fall & Injury Prevention, visit: [email or web address] OR  [email or web address] OR  https://www.cdc.gov/steadi/patient.html

## 2023-07-28 NOTE — PROGRESS NOTE ADULT - SUBJECTIVE AND OBJECTIVE BOX
Patient evaluated with chief resident during AM rounds    STATUS POST:  Repair of incisional or ventral hernia using component separation technique      POST OPERATIVE DAY #: 1    Overnight Events: NAEON  SUBJECTIVE: Seen and examined by resident. Patient comfortable this AM. Minimal pain. Tolerated clears yesterday, hungry. Ambulating without assistance. -F/-BM/-N/-V    Denies Chest Pain, Difficulty breathing, Calf Pain, Headache, Dizziness    OBJECTIVE:  Vital Signs Last 24 Hrs  T(C): 36.6 (28 Jul 2023 05:08), Max: 36.9 (27 Jul 2023 20:04)  T(F): 97.9 (28 Jul 2023 05:08), Max: 98.4 (27 Jul 2023 20:04)  HR: 95 (28 Jul 2023 05:08) (53 - 95)  BP: 145/72 (28 Jul 2023 05:08) (128/61 - 158/76)  BP(mean): 94 (27 Jul 2023 18:02) (88 - 99)  RR: 18 (28 Jul 2023 05:08) (10 - 20)  SpO2: 96% (28 Jul 2023 05:08) (96% - 100%)    Parameters below as of 28 Jul 2023 05:08  Patient On (Oxygen Delivery Method): room air        I&O's Summary    27 Jul 2023 07:01  -  28 Jul 2023 07:00  --------------------------------------------------------  IN: 1430 mL / OUT: 1100 mL / NET: 330 mL        Physical Exam:  General Appearance: Appears well, NAD  Pulmonary: Nonlabored breathing, no respiratory distress  Cardiovascular: NSR  Abdomen: Soft, nondistneded, appropriate incisional tenderness, dressings clean and dry and intact  Extremities: WWP, SCD's in place     LABS:

## 2023-07-28 NOTE — PROGRESS NOTE ADULT - ASSESSMENT
73M PMHx CAD (2 stents), DM, dementia, depression, DVT, GERD, BPH, renal cancer s/p partial nephrectomy, lung cancer s/p lung surgery presents for open repair of incarcerated ventral hernia with mesh, including bilateral component separation. s/p ventral hernia repair 7/27. Overnight stay patient preference. Cleared for discharge yesterday, pending ride from wife.     Plan:  Reg Diet  Encourage PO   OOB/Ambulate  DC Home today

## 2023-07-28 NOTE — DISCHARGE NOTE NURSING/CASE MANAGEMENT/SOCIAL WORK - PATIENT PORTAL LINK FT
You can access the FollowMyHealth Patient Portal offered by [facility] by registering at the following website: [email or web address]. By joining [employer]’s FollowMyHealth portal, you will also be able to view your health information using other applications (apps) compatible with our system.

## 2023-07-31 ENCOUNTER — RX RENEWAL (OUTPATIENT)
Age: 73
End: 2023-07-31

## 2023-08-01 ENCOUNTER — APPOINTMENT (OUTPATIENT)
Dept: OTOLARYNGOLOGY | Facility: CLINIC | Age: 73
End: 2023-08-01
Payer: MEDICARE

## 2023-08-01 VITALS
HEIGHT: 65 IN | TEMPERATURE: 97.9 F | WEIGHT: 199 LBS | SYSTOLIC BLOOD PRESSURE: 118 MMHG | BODY MASS INDEX: 33.15 KG/M2 | HEART RATE: 78 BPM | DIASTOLIC BLOOD PRESSURE: 67 MMHG

## 2023-08-01 PROCEDURE — 31579 LARYNGOSCOPY TELESCOPIC: CPT

## 2023-08-01 PROCEDURE — 99215 OFFICE O/P EST HI 40 MIN: CPT | Mod: 25

## 2023-08-01 NOTE — PHYSICAL EXAM
[Normal] : normal appearance, well groomed, well nourished, and in no acute distress [Laryngoscopy Performed] : laryngoscopy was performed, see procedure section for findings [FreeTextEntry1] : normal voice, mild hoarseness, mildly reduced range. adequate projection

## 2023-08-01 NOTE — ASSESSMENT
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 This was first dx in 2011 and treated with XRT. Since the last visit he was diagnosed with a left lung cancer which is already resected without need for chemo/XRT. Patient has recovered well. PET/CT was done at that time and patient have those results sent to me for review.  Currently, overall patient is stable and doing well. No new symptoms or concern for recurrence. Exam is consistent with post XRT changes to the larynx, today there was a raised white plaque left anterior vocal fold 2 mm. At this time I am recommending follow-up in 2 months. Should this persist we will plan for operative biopsy he knows to follow-up sooner should symptoms worsen or change. Evidence of supraglottic compression and speech evaluation offered. Pt would like to observe for now. Follow up 6 mo.  5/23/23: Overall no significant changes in symptoms. No new ear, nose, throat symptoms. Laryngoscopy/stroboscopy improved from previous with some new signs of irregularity of the right mid fold point of contact as well. at this time I think it would be prudent to set up for potential OR for surgical excision in September, including suspension [employer] laryngoscopy with excisional biopsy of lesions and KTP laser ablation. That being said patient will have a preoperative visit in August and if symptoms improve surgery will be canceled.   8/1/23: Overall well and stable. No significant changes in symptoms. Exam today improved from previous and stable when compared to previous exams (all exams reviewed from past year). At this time, I am recommending canceling surgery and follow up in 2 months for repeat evaluation.   - fu 2 months for repeat evaluation  - fu sooner if symptoms worsen or fail to improve

## 2023-08-01 NOTE — PROCEDURE
[de-identified] :  - Procedure Note Pre-operative Diagnosis: hx of left vocal fold cancer Post-operative Diagnosis: small 2 mm circular white plaque anterior left vocal fold Improved from previous, also noted some irregularity along the right vocal fold point of contact mid fold, post XRT changes noted. Anesthesia: Topical - 1 % Lidocaine/Phenylephrine  Procedure: Flexible Laryngoscopy with Stroboscopy  Procedure Details:  The patient was placed in the sitting position. After decongestant and anesthesia were applied the laryngoscope was passed. The nasal cavities, nasopharynx, oropharynx, hypopharynx, and larynx were all examined. Vocal folds were examined during respiration and phonation. The following findings were noted:    Findings: Nose: Septum is midline, turbinates are normal, nasal airways patent, mucosa normal Nasopharynx: Adenoids normal, no masses, eustachian tube normal Oropharynx: Pharyngeal walls symmetric and without lesion. Tonsils/fossae symmetric Hypopharynx: Hypopharynx and pyriform sinuses without lesion. No masses or asymmetry. No pooling of secretions. Larynx: Epiglottis and aryepiglottic folds were sharp and crisp bilaterally. Bilateral false vocal folds normal appearance. Airway was widely patent.   Strobe Exam Ratings  TVF Appearance: mild edema and scarring, worse on the right post VF, small 2 mm circular white plaque anterior left vocal fold improved from previous now red, evidence of some irregularity at the point of contact mid right vocal fold  TVF Mobility: reduced abduction, but airway patent Edema/hypertrophy: mild Mucus on TVF: normal Glottic Closure: adequate Mucosal Wave: reduced Amplitude of Vibration: reduced Phase: symmetric Supraglottic Hyperfunction: +supraglottic compression  Other Findings: none Condition: Stable. Patient tolerated procedure well. Complications: None.

## 2023-08-01 NOTE — HISTORY OF PRESENT ILLNESS
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 This was first dx in 2011 and treated with XRT. In 2016 and again in 2017 he underwent rebiopsy for suspicious lesions concerning for recurrence, both times negative. Today he notes no issues or changes in terms of chewing, eating or swallowing. No voice changes and no breathing issues. He denies any issues with fevers, night sweats, or unplanned weight loss. No referred otalgia. No new neck masses, or lesions. He actually notes voice improvement. 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    Former smoker, quit several years ago.  -  Update 5/17/22  Patient with patient is overall stable and well. No changes to his voice. No issues chewing, eating, or swallowing. No breathing issues. He has noted some laryngitis over the past few days as he has been raising his voice at home but otherwise he has been normal. He denies any fevers, night sweats, weight loss, referred otalgia, new neck masses. He denies any new ear, nose, throat symptoms otherwise.  -  Interval History: 11/15/22  At the time of the last visit I requested a chest x-ray which was not completed. The following month he actually did have a CT chest and was ultimately diagnosed with lung cancer, left upper lobe. The patient underwent resection without need for chemotherapy or XRT. Per the patient he is now cancer free. During that work-up he did complete a PET/CT and this was negative for any disease in the neck.    Patient with patient is overall stable and well. No changes to his voice. No issues chewing, eating, or swallowing. No breathing issues. He denies any fevers, night sweats, weight loss, referred otalgia, new neck masses. He denies any new ear, nose, throat symptoms otherwise.  -  4/13/23  Pt overall well and stable. For the past month, he reports sensation of something stuck on the right side of his throat. He feels this when he's swallowing. Denies difficulty chewing, eating or swallowing. No breathing issues. No voice changes. No fevers, night sweats, weight loss.  -   Interval History: 5/23/2023  Previous visit a vocal lesion was noted and patient presents for surveillance. No issues chewing, eating, swallowing. No changes in voice or breathing issues. No fevers night sweats weight loss or referred otalgia. No new ear, nose, throat symptoms otherwise. - [FreeTextEntry1] : 8/1/23 Pt presents today for follow up of vocal fold lesion. No issues chewing, eating, swallowing. No changes in voice or breathing issues. No fevers night sweats weight loss or referred otalgia. No new ear, nose, throat symptoms otherwise.

## 2023-08-02 ENCOUNTER — APPOINTMENT (OUTPATIENT)
Dept: FAMILY MEDICINE | Facility: CLINIC | Age: 73
End: 2023-08-02
Payer: MEDICARE

## 2023-08-02 VITALS
OXYGEN SATURATION: 98 % | DIASTOLIC BLOOD PRESSURE: 74 MMHG | RESPIRATION RATE: 12 BRPM | SYSTOLIC BLOOD PRESSURE: 133 MMHG | HEART RATE: 84 BPM | TEMPERATURE: 97.9 F | WEIGHT: 195 LBS | HEIGHT: 65 IN | BODY MASS INDEX: 32.49 KG/M2

## 2023-08-02 DIAGNOSIS — Z11.59 ENCOUNTER FOR SCREENING FOR OTHER VIRAL DISEASES: ICD-10-CM

## 2023-08-02 DIAGNOSIS — M75.51 BURSITIS OF RIGHT SHOULDER: ICD-10-CM

## 2023-08-02 DIAGNOSIS — Z23 ENCOUNTER FOR IMMUNIZATION: ICD-10-CM

## 2023-08-02 DIAGNOSIS — N40.1 BENIGN PROSTATIC HYPERPLASIA WITH LOWER URINARY TRACT SYMPMS: ICD-10-CM

## 2023-08-02 DIAGNOSIS — Z86.39 PERSONAL HISTORY OF OTHER ENDOCRINE, NUTRITIONAL AND METABOLIC DISEASE: ICD-10-CM

## 2023-08-02 DIAGNOSIS — R49.0 DYSPHONIA: ICD-10-CM

## 2023-08-02 DIAGNOSIS — M25.561 PAIN IN RIGHT KNEE: ICD-10-CM

## 2023-08-02 DIAGNOSIS — N13.8 BENIGN PROSTATIC HYPERPLASIA WITH LOWER URINARY TRACT SYMPMS: ICD-10-CM

## 2023-08-02 DIAGNOSIS — M54.50 LOW BACK PAIN, UNSPECIFIED: ICD-10-CM

## 2023-08-02 DIAGNOSIS — Z87.898 PERSONAL HISTORY OF OTHER SPECIFIED CONDITIONS: ICD-10-CM

## 2023-08-02 DIAGNOSIS — Z87.39 PERSONAL HISTORY OF OTHER DISEASES OF THE MUSCULOSKELETAL SYSTEM AND CONNECTIVE TISSUE: ICD-10-CM

## 2023-08-02 DIAGNOSIS — Z87.09 PERSONAL HISTORY OF OTHER DISEASES OF THE RESPIRATORY SYSTEM: ICD-10-CM

## 2023-08-02 DIAGNOSIS — Z01.818 ENCOUNTER FOR OTHER PREPROCEDURAL EXAMINATION: ICD-10-CM

## 2023-08-02 PROBLEM — F41.9 ANXIETY DISORDER, UNSPECIFIED: Chronic | Status: ACTIVE | Noted: 2023-07-26

## 2023-08-02 PROBLEM — D64.9 ANEMIA, UNSPECIFIED: Chronic | Status: ACTIVE | Noted: 2023-07-26

## 2023-08-02 PROCEDURE — G0439: CPT

## 2023-08-02 RX ORDER — FLUTICASONE PROPIONATE 50 UG/1
50 SPRAY, METERED NASAL DAILY
Qty: 48 | Refills: 0 | Status: DISCONTINUED | COMMUNITY
Start: 2022-12-01 | End: 2023-08-02

## 2023-08-02 RX ORDER — PROMETHAZINE HYDROCHLORIDE 6.25 MG/5ML
6.25 SOLUTION ORAL
Qty: 140 | Refills: 0 | Status: DISCONTINUED | COMMUNITY
Start: 2022-12-01 | End: 2023-08-02

## 2023-08-02 RX ORDER — PROMETHAZINE HYDROCHLORIDE 6.25 MG/5ML
6.25 SOLUTION ORAL
Qty: 140 | Refills: 2 | Status: DISCONTINUED | COMMUNITY
Start: 2023-03-15 | End: 2023-08-02

## 2023-08-02 RX ORDER — BENZONATATE 100 MG/1
100 CAPSULE ORAL TWICE DAILY
Qty: 30 | Refills: 0 | Status: DISCONTINUED | COMMUNITY
Start: 2022-12-19 | End: 2023-08-02

## 2023-08-02 RX ORDER — BLOOD SUGAR DIAGNOSTIC
STRIP MISCELLANEOUS 3 TIMES DAILY
Qty: 1 | Refills: 3 | Status: ACTIVE | COMMUNITY
Start: 2022-05-10 | End: 1900-01-01

## 2023-08-02 RX ORDER — LANCETS 30 GAUGE
EACH MISCELLANEOUS
Qty: 1 | Refills: 3 | Status: ACTIVE | COMMUNITY
Start: 2022-05-10 | End: 1900-01-01

## 2023-08-03 RX ORDER — AZITHROMYCIN 250 MG/1
250 TABLET, FILM COATED ORAL
Qty: 1 | Refills: 0 | Status: DISCONTINUED | COMMUNITY
Start: 2022-12-01 | End: 2023-08-03

## 2023-08-03 RX ORDER — AZITHROMYCIN 250 MG/1
250 TABLET, FILM COATED ORAL
Qty: 1 | Refills: 0 | Status: DISCONTINUED | COMMUNITY
Start: 2023-03-15 | End: 2023-08-03

## 2023-08-03 RX ORDER — AMLODIPINE BESYLATE 5 MG/1
5 TABLET ORAL DAILY
Refills: 0 | Status: DISCONTINUED | COMMUNITY
End: 2023-08-03

## 2023-08-03 RX ORDER — ATENOLOL 25 MG/1
25 TABLET ORAL TWICE DAILY
Qty: 180 | Refills: 0 | Status: DISCONTINUED | COMMUNITY
Start: 2021-02-25 | End: 2023-08-03

## 2023-08-03 NOTE — PHYSICAL EXAM
[Pedal Pulses Present] : the pedal pulses are present [No Edema] : there was no peripheral edema [No Extremity Clubbing/Cyanosis] : no extremity clubbing/cyanosis [Soft] : abdomen soft [Non-distended] : non-distended [No Masses] : no abdominal mass palpated [No CVA Tenderness] : no CVA  tenderness [Grossly Normal Strength/Tone] : grossly normal strength/tone [Normal] : affect was normal and insight and judgment were intact [de-identified] : midline surgical scar

## 2023-08-03 NOTE — PLAN
[FreeTextEntry1] : Patient is very compliant with care and follows recommendations and follows regularly with his physicans.  Maintain current care as directed.

## 2023-08-03 NOTE — HISTORY OF PRESENT ILLNESS
[de-identified] : 73 year old male with history of lung cancer s/p resection approx 1 year ago, kidney cancer, CAD s/p stents, DM, Htn, Hyperthyrodism, GERD presents for annual physical.  Patient is very compliant with his care and follows with physicians regularly including Cardio, nephrology, Endocrinology, Urology, GI.  He had ventral hernia surgery approx 5 days ago and wound is healing well.   He brought in his medications which includes: meformin ER 500mg daily, Atorvastain 80mg daily Amoldipine 2.5mg daily, carvedilol 25mg bid, zetia 10mg daily, Trulicity, Venlafaxine 150mg plavix 75mg Jardiance 25mg daily, Olmesartan /hctz 40/12.5, methazole 5mg, Tamsulosin .4mg daily,  Omeprazole 20mg

## 2023-08-03 NOTE — HEALTH RISK ASSESSMENT
[Good] : ~his/her~  mood as  good [No] : No [Never (0 pts)] : Never (0 points) [No falls in past year] : Patient reported no falls in the past year [0] : 2) Feeling down, depressed, or hopeless: Not at all (0) [PHQ-2 Negative - No further assessment needed] : PHQ-2 Negative - No further assessment needed [Patient reported colonoscopy was abnormal] : Patient reported colonoscopy was abnormal [None] : None [With Significant Other] : lives with significant other [] :  [Fully functional (bathing, dressing, toileting, transferring, walking, feeding)] : Fully functional (bathing, dressing, toileting, transferring, walking, feeding) [Fully functional (using the telephone, shopping, preparing meals, housekeeping, doing laundry, using] : Fully functional and needs no help or supervision to perform IADLs (using the telephone, shopping, preparing meals, housekeeping, doing laundry, using transportation, managing medications and managing finances) [Former] : Former [20 or more] : 20 or more [> 15 Years] : > 15 Years [Audit-CScore] : 0 [Change in mental status noted] : No change in mental status noted [Language] : denies difficulty with language [Behavior] : denies difficulty with behavior [Learning/Retaining New Information] : denies difficulty learning/retaining new information [Reasoning] : denies difficulty with reasoning [Spatial Ability and Orientation] : denies difficulty with spatial ability and orientation [Reports changes in hearing] : Reports no changes in hearing [Reports changes in vision] : Reports no changes in vision [Reports changes in dental health] : Reports no changes in dental health [ColonoscopyComments] : approximately 5 years, polyps [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

## 2023-08-03 NOTE — REVIEW OF SYSTEMS
[Joint Pain] : joint pain [Anxiety] : anxiety [Negative] : Heme/Lymph [Muscle Pain] : no muscle pain [Muscle Weakness] : no muscle weakness [Suicidal] : not suicidal [Insomnia] : no insomnia [Depression] : no depression [FreeTextEntry9] : knee pain

## 2023-08-07 DIAGNOSIS — R59.0 LOCALIZED ENLARGED LYMPH NODES: ICD-10-CM

## 2023-08-11 LAB — SURGICAL PATHOLOGY STUDY: SIGNIFICANT CHANGE UP

## 2023-09-01 ENCOUNTER — APPOINTMENT (OUTPATIENT)
Dept: OTOLARYNGOLOGY | Facility: CLINIC | Age: 73
End: 2023-09-01

## 2023-09-05 ENCOUNTER — RX RENEWAL (OUTPATIENT)
Age: 73
End: 2023-09-05

## 2023-09-06 ENCOUNTER — APPOINTMENT (OUTPATIENT)
Dept: OTOLARYNGOLOGY | Facility: AMBULATORY SURGERY CENTER | Age: 73
End: 2023-09-06

## 2023-09-14 ENCOUNTER — APPOINTMENT (OUTPATIENT)
Dept: OTOLARYNGOLOGY | Facility: CLINIC | Age: 73
End: 2023-09-14

## 2023-09-14 ENCOUNTER — APPOINTMENT (OUTPATIENT)
Dept: PULMONOLOGY | Facility: CLINIC | Age: 73
End: 2023-09-14
Payer: MEDICARE

## 2023-09-14 VITALS
DIASTOLIC BLOOD PRESSURE: 63 MMHG | SYSTOLIC BLOOD PRESSURE: 132 MMHG | RESPIRATION RATE: 14 BRPM | HEART RATE: 74 BPM | WEIGHT: 195 LBS | BODY MASS INDEX: 32.49 KG/M2 | OXYGEN SATURATION: 99 % | HEIGHT: 65 IN

## 2023-09-14 DIAGNOSIS — Z87.891 PERSONAL HISTORY OF NICOTINE DEPENDENCE: ICD-10-CM

## 2023-09-14 DIAGNOSIS — Z01.818 ENCOUNTER FOR OTHER PREPROCEDURAL EXAMINATION: ICD-10-CM

## 2023-09-14 DIAGNOSIS — C14.0 MALIGNANT NEOPLASM OF PHARYNX, UNSPECIFIED: ICD-10-CM

## 2023-09-14 DIAGNOSIS — K43.9 VENTRAL HERNIA W/OUT OBSTRUCTION OR GANGRENE: ICD-10-CM

## 2023-09-14 DIAGNOSIS — C34.90 MALIGNANT NEOPLASM OF UNSPECIFIED PART OF UNSPECIFIED BRONCHUS OR LUNG: ICD-10-CM

## 2023-09-14 DIAGNOSIS — R91.8 OTHER NONSPECIFIC ABNORMAL FINDING OF LUNG FIELD: ICD-10-CM

## 2023-09-14 DIAGNOSIS — G47.8 OTHER SLEEP DISORDERS: ICD-10-CM

## 2023-09-14 DIAGNOSIS — Z87.09 PERSONAL HISTORY OF OTHER DISEASES OF THE RESPIRATORY SYSTEM: ICD-10-CM

## 2023-09-14 DIAGNOSIS — Z80.9 FAMILY HISTORY OF MALIGNANT NEOPLASM, UNSPECIFIED: ICD-10-CM

## 2023-09-14 DIAGNOSIS — Z80.1 FAMILY HISTORY OF MALIGNANT NEOPLASM OF TRACHEA, BRONCHUS AND LUNG: ICD-10-CM

## 2023-09-14 PROCEDURE — 99214 OFFICE O/P EST MOD 30 MIN: CPT

## 2023-09-14 RX ORDER — MONTELUKAST 10 MG/1
10 TABLET, FILM COATED ORAL
Qty: 30 | Refills: 3 | Status: ACTIVE | COMMUNITY
Start: 2023-09-14 | End: 1900-01-01

## 2023-09-14 RX ORDER — BUDESONIDE, GLYCOPYRROLATE, AND FORMOTEROL FUMARATE 160; 9; 4.8 UG/1; UG/1; UG/1
160-9-4.8 AEROSOL, METERED RESPIRATORY (INHALATION) TWICE DAILY
Qty: 1 | Refills: 3 | Status: ACTIVE | COMMUNITY
Start: 2023-09-14 | End: 1900-01-01

## 2023-09-15 LAB
ALBUMIN SERPL ELPH-MCNC: 4.1 G/DL
ALP BLD-CCNC: 145 U/L
ALT SERPL-CCNC: 17 U/L
ANION GAP SERPL CALC-SCNC: 13 MMOL/L
AST SERPL-CCNC: 18 U/L
BILIRUB SERPL-MCNC: 0.2 MG/DL
BUN SERPL-MCNC: 36 MG/DL
CALCIUM SERPL-MCNC: 9.4 MG/DL
CHLORIDE SERPL-SCNC: 103 MMOL/L
CO2 SERPL-SCNC: 22 MMOL/L
CREAT SERPL-MCNC: 1.78 MG/DL
EGFR: 40 ML/MIN/1.73M2
GLUCOSE SERPL-MCNC: 197 MG/DL
HCT VFR BLD CALC: 39.4 %
HGB BLD-MCNC: 12.2 G/DL
INR PPP: 0.93 RATIO
MCHC RBC-ENTMCNC: 28.5 PG
MCHC RBC-ENTMCNC: 31 GM/DL
MCV RBC AUTO: 92.1 FL
PLATELET # BLD AUTO: 187 K/UL
POTASSIUM SERPL-SCNC: 4.3 MMOL/L
PROT SERPL-MCNC: 6.3 G/DL
PT BLD: 10.5 SEC
RBC # BLD: 4.28 M/UL
RBC # FLD: 15.5 %
SODIUM SERPL-SCNC: 138 MMOL/L
WBC # FLD AUTO: 7.27 K/UL

## 2023-10-03 ENCOUNTER — APPOINTMENT (OUTPATIENT)
Dept: OTOLARYNGOLOGY | Facility: CLINIC | Age: 73
End: 2023-10-03
Payer: MEDICARE

## 2023-10-03 VITALS
SYSTOLIC BLOOD PRESSURE: 134 MMHG | DIASTOLIC BLOOD PRESSURE: 69 MMHG | BODY MASS INDEX: 32.49 KG/M2 | WEIGHT: 195 LBS | HEART RATE: 77 BPM | HEIGHT: 65 IN | TEMPERATURE: 97.3 F | OXYGEN SATURATION: 98 %

## 2023-10-03 PROCEDURE — 31579 LARYNGOSCOPY TELESCOPIC: CPT

## 2023-10-03 PROCEDURE — 99215 OFFICE O/P EST HI 40 MIN: CPT | Mod: 25

## 2023-10-04 ENCOUNTER — TRANSCRIPTION ENCOUNTER (OUTPATIENT)
Age: 73
End: 2023-10-04

## 2023-10-04 NOTE — PATIENT PROFILE ADULT - FALL HARM RISK - HARM RISK INTERVENTIONS

## 2023-10-04 NOTE — PATIENT PROFILE ADULT - FUNCTIONAL ASSESSMENT - DAILY ACTIVITY 2.
[facility]    PATIENT'S NAME: Shwetha Busby PHYSICIAN: Dodie Lynn MD   PATIENT ACCOUNT#:   [id number]    LOCATION:  [address] [place] [address] RECORD #:   [id number]       YOB: 1994  ADMISSION DATE:       01/08/4 = No assist / stand by assistance

## 2023-10-04 NOTE — PATIENT PROFILE ADULT - NSPROMEDSADMININFO_GEN_A_NUR
no concerns Xelshawneez Pregnancy And Lactation Text: This medication is Pregnancy Category D and is not considered safe during pregnancy.  The risk during breast feeding is also uncertain.

## 2023-10-05 ENCOUNTER — TRANSCRIPTION ENCOUNTER (OUTPATIENT)
Age: 73
End: 2023-10-05

## 2023-10-05 ENCOUNTER — INPATIENT (INPATIENT)
Facility: HOSPITAL | Age: 73
LOS: 0 days | Discharge: HOME | DRG: 572 | End: 2023-10-06
Attending: SURGERY | Admitting: SURGERY
Payer: MEDICARE

## 2023-10-05 VITALS
SYSTOLIC BLOOD PRESSURE: 158 MMHG | DIASTOLIC BLOOD PRESSURE: 70 MMHG | WEIGHT: 196.43 LBS | HEIGHT: 63 IN | HEART RATE: 72 BPM | TEMPERATURE: 97 F | RESPIRATION RATE: 17 BRPM | OXYGEN SATURATION: 97 %
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
## 2023-10-05 DIAGNOSIS — Z98.890 OTHER SPECIFIED POSTPROCEDURAL STATES: Chronic | ICD-10-CM

## 2023-10-05 DIAGNOSIS — Z95.5 PRESENCE OF CORONARY ANGIOPLASTY IMPLANT AND GRAFT: Chronic | ICD-10-CM

## 2023-10-05 DIAGNOSIS — Z98.89 OTHER SPECIFIED POSTPROCEDURAL STATES: Chronic | ICD-10-CM
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
## 2023-10-05 DIAGNOSIS — Z96.652 PRESENCE OF LEFT ARTIFICIAL KNEE JOINT: Chronic | ICD-10-CM

## 2023-10-05 LAB
ANION GAP SERPL CALC-SCNC: 8 MMOL/L — SIGNIFICANT CHANGE UP (ref 5–17)
B-OH-BUTYR SERPL-SCNC: 0.2 MMOL/L — SIGNIFICANT CHANGE UP
BASE EXCESS BLDA CALC-SCNC: -0.8 MMOL/L — SIGNIFICANT CHANGE UP (ref -2–3)
BUN SERPL-MCNC: 36 MG/DL — HIGH (ref 7–23)
CALCIUM SERPL-MCNC: 8.8 MG/DL — SIGNIFICANT CHANGE UP (ref 8.4–10.5)
CHLORIDE SERPL-SCNC: 105 MMOL/L — SIGNIFICANT CHANGE UP (ref 96–108)
CO2 BLDA-SCNC: 26 MMOL/L — HIGH (ref 19–24)
CO2 SERPL-SCNC: 24 MMOL/L — SIGNIFICANT CHANGE UP (ref 22–31)
CREAT SERPL-MCNC: 1.74 MG/DL — HIGH (ref 0.5–1.3)
EGFR: 41 ML/MIN/1.73M2 — LOW
GLUCOSE BLDC GLUCOMTR-MCNC: 118 MG/DL — HIGH (ref 70–99)
GLUCOSE BLDC GLUCOMTR-MCNC: 120 MG/DL — HIGH (ref 70–99)
GLUCOSE BLDC GLUCOMTR-MCNC: 128 MG/DL — HIGH (ref 70–99)
GLUCOSE BLDC GLUCOMTR-MCNC: 135 MG/DL — HIGH (ref 70–99)
GLUCOSE SERPL-MCNC: 131 MG/DL — HIGH (ref 70–99)
HCO3 BLDA-SCNC: 24 MMOL/L — SIGNIFICANT CHANGE UP (ref 21–28)
HCT VFR BLD CALC: 33.7 % — LOW (ref 39–50)
HGB BLD-MCNC: 11.2 G/DL — LOW (ref 13–17)
MAGNESIUM SERPL-MCNC: 1.7 MG/DL — SIGNIFICANT CHANGE UP (ref 1.6–2.6)
MCHC RBC-ENTMCNC: 29.6 PG — SIGNIFICANT CHANGE UP (ref 27–34)
MCHC RBC-ENTMCNC: 33.2 GM/DL — SIGNIFICANT CHANGE UP (ref 32–36)
MCV RBC AUTO: 88.9 FL — SIGNIFICANT CHANGE UP (ref 80–100)
NRBC # BLD: 0 /100 WBCS — SIGNIFICANT CHANGE UP (ref 0–0)
PCO2 BLDA: 41 MMHG — SIGNIFICANT CHANGE UP (ref 35–48)
PH BLDA: 7.38 — SIGNIFICANT CHANGE UP (ref 7.35–7.45)
PHOSPHATE SERPL-MCNC: 4.5 MG/DL — SIGNIFICANT CHANGE UP (ref 2.5–4.5)
PLATELET # BLD AUTO: 173 K/UL — SIGNIFICANT CHANGE UP (ref 150–400)
PO2 BLDA: 112 MMHG — HIGH (ref 83–108)
POTASSIUM SERPL-MCNC: 4.2 MMOL/L — SIGNIFICANT CHANGE UP (ref 3.5–5.3)
POTASSIUM SERPL-SCNC: 4.2 MMOL/L — SIGNIFICANT CHANGE UP (ref 3.5–5.3)
RBC # BLD: 3.79 M/UL — LOW (ref 4.2–5.8)
RBC # FLD: 14.5 % — SIGNIFICANT CHANGE UP (ref 10.3–14.5)
SAO2 % BLDA: 99.3 % — HIGH (ref 94–98)
SODIUM SERPL-SCNC: 137 MMOL/L — SIGNIFICANT CHANGE UP (ref 135–145)
WBC # BLD: 7.28 K/UL — SIGNIFICANT CHANGE UP (ref 3.8–10.5)
WBC # FLD AUTO: 7.28 K/UL — SIGNIFICANT CHANGE UP (ref 3.8–10.5)

## 2023-10-05 PROCEDURE — 88305 TISSUE EXAM BY PATHOLOGIST: CPT | Mod: 26

## 2023-10-05 RX ORDER — CLOPIDOGREL BISULFATE 75 MG/1
1 TABLET, FILM COATED ORAL
Qty: 0 | Refills: 0 | DISCHARGE

## 2023-10-05 RX ORDER — DEXTROSE 50 % IN WATER 50 %
25 SYRINGE (ML) INTRAVENOUS ONCE
Refills: 0 | Status: DISCONTINUED | OUTPATIENT
Start: 2023-10-05 | End: 2023-10-06

## 2023-10-05 RX ORDER — TAMSULOSIN HYDROCHLORIDE 0.4 MG/1
0.4 CAPSULE ORAL AT BEDTIME
Refills: 0 | Status: DISCONTINUED | OUTPATIENT
Start: 2023-10-05 | End: 2023-10-06

## 2023-10-05 RX ORDER — AMLODIPINE BESYLATE 2.5 MG/1
10 TABLET ORAL DAILY
Refills: 0 | Status: DISCONTINUED | OUTPATIENT
Start: 2023-10-06 | End: 2023-10-06

## 2023-10-05 RX ORDER — ACETAMINOPHEN 500 MG
650 TABLET ORAL EVERY 6 HOURS
Refills: 0 | Status: DISCONTINUED | OUTPATIENT
Start: 2023-10-05 | End: 2023-10-06

## 2023-10-05 RX ORDER — DEXTROSE 50 % IN WATER 50 %
12.5 SYRINGE (ML) INTRAVENOUS ONCE
Refills: 0 | Status: DISCONTINUED | OUTPATIENT
Start: 2023-10-05 | End: 2023-10-06

## 2023-10-05 RX ORDER — ATORVASTATIN CALCIUM 80 MG/1
80 TABLET, FILM COATED ORAL AT BEDTIME
Refills: 0 | Status: DISCONTINUED | OUTPATIENT
Start: 2023-10-05 | End: 2023-10-06

## 2023-10-05 RX ORDER — SODIUM CHLORIDE 9 MG/ML
1000 INJECTION, SOLUTION INTRAVENOUS
Refills: 0 | Status: DISCONTINUED | OUTPATIENT
Start: 2023-10-05 | End: 2023-10-06

## 2023-10-05 RX ORDER — ALPRAZOLAM 0.25 MG
1 TABLET ORAL DAILY
Refills: 0 | Status: DISCONTINUED | OUTPATIENT
Start: 2023-10-06 | End: 2023-10-06

## 2023-10-05 RX ORDER — INSULIN LISPRO 100/ML
VIAL (ML) SUBCUTANEOUS
Refills: 0 | Status: DISCONTINUED | OUTPATIENT
Start: 2023-10-05 | End: 2023-10-06

## 2023-10-05 RX ORDER — GLUCAGON INJECTION, SOLUTION 0.5 MG/.1ML
1 INJECTION, SOLUTION SUBCUTANEOUS ONCE
Refills: 0 | Status: DISCONTINUED | OUTPATIENT
Start: 2023-10-05 | End: 2023-10-06

## 2023-10-05 RX ORDER — PANTOPRAZOLE SODIUM 20 MG/1
40 TABLET, DELAYED RELEASE ORAL
Refills: 0 | Status: DISCONTINUED | OUTPATIENT
Start: 2023-10-06 | End: 2023-10-06

## 2023-10-05 RX ORDER — VENLAFAXINE HCL 75 MG
150 CAPSULE, EXT RELEASE 24 HR ORAL DAILY
Refills: 0 | Status: DISCONTINUED | OUTPATIENT
Start: 2023-10-06 | End: 2023-10-06

## 2023-10-05 RX ORDER — SITAGLIPTIN AND METFORMIN HYDROCHLORIDE 500; 50 MG/1; MG/1
1 TABLET, FILM COATED ORAL
Qty: 0 | Refills: 0 | DISCHARGE

## 2023-10-05 RX ORDER — MONTELUKAST 4 MG/1
10 TABLET, CHEWABLE ORAL DAILY
Refills: 0 | Status: DISCONTINUED | OUTPATIENT
Start: 2023-10-06 | End: 2023-10-06

## 2023-10-05 RX ORDER — BENZOCAINE AND MENTHOL 5; 1 G/100ML; G/100ML
1 LIQUID ORAL EVERY 4 HOURS
Refills: 0 | Status: DISCONTINUED | OUTPATIENT
Start: 2023-10-05 | End: 2023-10-06

## 2023-10-05 RX ORDER — INSULIN LISPRO 100/ML
VIAL (ML) SUBCUTANEOUS AT BEDTIME
Refills: 0 | Status: DISCONTINUED | OUTPATIENT
Start: 2023-10-05 | End: 2023-10-06

## 2023-10-05 RX ORDER — CARVEDILOL PHOSPHATE 80 MG/1
25 CAPSULE, EXTENDED RELEASE ORAL EVERY 12 HOURS
Refills: 0 | Status: DISCONTINUED | OUTPATIENT
Start: 2023-10-05 | End: 2023-10-06

## 2023-10-05 RX ORDER — LIDOCAINE 4 G/100G
1 CREAM TOPICAL ONCE
Refills: 0 | Status: COMPLETED | OUTPATIENT
Start: 2023-10-05 | End: 2023-10-06

## 2023-10-05 RX ORDER — ONDANSETRON 8 MG/1
4 TABLET, FILM COATED ORAL EVERY 6 HOURS
Refills: 0 | Status: DISCONTINUED | OUTPATIENT
Start: 2023-10-05 | End: 2023-10-06

## 2023-10-05 RX ORDER — DEXTROSE 50 % IN WATER 50 %
15 SYRINGE (ML) INTRAVENOUS ONCE
Refills: 0 | Status: DISCONTINUED | OUTPATIENT
Start: 2023-10-05 | End: 2023-10-06

## 2023-10-05 RX ORDER — ASPIRIN/CALCIUM CARB/MAGNESIUM 324 MG
0 TABLET ORAL
Qty: 0 | Refills: 0 | DISCHARGE

## 2023-10-05 RX ORDER — HEPARIN SODIUM 5000 [USP'U]/ML
5000 INJECTION INTRAVENOUS; SUBCUTANEOUS EVERY 8 HOURS
Refills: 0 | Status: DISCONTINUED | OUTPATIENT
Start: 2023-10-05 | End: 2023-10-06

## 2023-10-05 RX ORDER — MAGNESIUM SULFATE 500 MG/ML
1 VIAL (ML) INJECTION ONCE
Refills: 0 | Status: COMPLETED | OUTPATIENT
Start: 2023-10-05 | End: 2023-10-05

## 2023-10-05 RX ADMIN — TAMSULOSIN HYDROCHLORIDE 0.4 MILLIGRAM(S): 0.4 CAPSULE ORAL at 21:31

## 2023-10-05 RX ADMIN — CARVEDILOL PHOSPHATE 25 MILLIGRAM(S): 80 CAPSULE, EXTENDED RELEASE ORAL at 21:31

## 2023-10-05 RX ADMIN — HEPARIN SODIUM 5000 UNIT(S): 5000 INJECTION INTRAVENOUS; SUBCUTANEOUS at 21:30

## 2023-10-05 RX ADMIN — Medication 100 GRAM(S): at 15:20

## 2023-10-05 RX ADMIN — ATORVASTATIN CALCIUM 80 MILLIGRAM(S): 80 TABLET, FILM COATED ORAL at 21:31

## 2023-10-05 RX ADMIN — BENZOCAINE AND MENTHOL 1 LOZENGE: 5; 1 LIQUID ORAL at 21:31

## 2023-10-05 RX ADMIN — Medication 650 MILLIGRAM(S): at 21:24

## 2023-10-05 RX ADMIN — Medication 650 MILLIGRAM(S): at 20:54

## 2023-10-05 NOTE — ASU DISCHARGE PLAN (ADULT/PEDIATRIC) - CARE PROVIDER_API CALL
Keven Ferreira [facility]  Surgery  [address] [place] [address], [address]  [place] [address]  Phone: [phone]  Fax: [phone]  Follow Up Time: 2 weeks

## 2023-10-05 NOTE — BRIEF OPERATIVE NOTE - NSICDXBRIEFPROCEDURE_GEN_ALL_CORE_FT
PROCEDURES:  Exploration, abdominal wall 05-Oct-2023 12:51:29  Leonor Katz  Plication of anterior rectus sheath 05-Oct-2023 12:51:45  Leonor Katz  Evacuation, hematoma, torso 05-Oct-2023 12:51:57  Leonor Katz

## 2023-10-05 NOTE — ASU DISCHARGE PLAN (ADULT/PEDIATRIC) - NS MD DC FALL RISK RISK
For information on Fall & Injury Prevention, visit: [email or web address] OR  [email or web address] OR  https://www.cdc.gov/steadi/patient.html

## 2023-10-05 NOTE — PRE-ANESTHESIA EVALUATION ADULT - NSDENTALSD_ENT_ALL_CORE
Missing rear molars, poor dentition with several cavities/chips/wear and ground down teeth./missing teeth

## 2023-10-05 NOTE — BRIEF OPERATIVE NOTE - TYPE OF ANESTHESIA
Pt notified that device is not in the office and she has to call and verify benefits and shipment   General

## 2023-10-05 NOTE — ASU DISCHARGE PLAN (ADULT/PEDIATRIC) - ASU DC SPECIAL INSTRUCTIONSFT
Follow up with Dr. Ferreira/Dr. Aly in 1-2 weeks. Call the office to make/confirm the appointment.     You may shower. Do not scrub incision. Pat dry. Do not submerge in water.     Wear abdominal binder at all times. No heavy lifting or exercise. No sports or gym.     Take tylenol as needed for pain. Do not exceed 4G tylenol daily.    Resume all home medications as indicated. Restart Plavix on the night of 10/6. Follow up with Dr. Ferreira/Dr. Aly in 1-2 weeks. Call the office to make/confirm the appointment.     You may shower. Do not scrub incision. Pat dry. Do not submerge in water.     Wear abdominal binder at all times. No heavy lifting or exercise. No sports or gym.     Take tylenol as needed for pain. Do not exceed 4G tylenol daily.    Resume all home medications as indicated. Restart Plavix on the night of 10/6. Restart Jardiance in 5 days on the morning of 10/11. Follow up with Dr. Ferreira/Dr. Aly in 1-2 weeks. Call the office to make/confirm the appointment.   You may shower. Do not scrub incision. Pat dry. Do not submerge in water.     Wear abdominal binder at all times. No heavy lifting or exercise. No sports or gym.     Take tylenol as needed for pain. Do not exceed 4G tylenol daily.    Resume all home medications as indicated. Restart Plavix on the night of 10/6. Restart Jardiance in 5 days on the morning of 10/11.

## 2023-10-05 NOTE — PRE-ANESTHESIA EVALUATION ADULT - NSANTHPMHFT_GEN_ALL_CORE
Cardiac: Positive for HTN, HLD, CHF, CAD s/p Cardiac Stent x 3. Denies history of MI/Angina, Arrhythmia, Murmur/Valvular Disorder. >4 METS  Pulmonary: Positive for prior smoking history, lung cancer s/p resection. Denies BRENDA.  Renal: Positive for BPH and CKD, creatinine 1.78 on most recent laboratories.  Hepatic: Denies liver dysfunction  Gastrointestinal: Positive for GERD. Denies IBS  Endocrine: Positive for DM type II c/b peripheral neuropathy. Denies thyroid dysfunction.  Neurologic: Positive for lower back pain and mild dementia. Denies stroke/seizure disorder  Psychiatric: Positive for anxiety/depression.  Hematologic: Positive for plavix use, stopped ten days ago. Prior history of DVT. Denies anemia, blood clotting disorder.  Oncologic: Positive for renal, lung, throat cancer    PSH: Laryngoscopy, lung resection, cardiac stents, knee arthroscopy, renal cyst resection, left knee replacement.

## 2023-10-05 NOTE — BRIEF OPERATIVE NOTE - OPERATION/FINDINGS
Prior hernia scar in L upper abdomen excised via elliptical incision. Subcutaneous tissue dissected circumferentially around hard cystic structure to fascia. Posterior border of cyst appeared to be contiguous with fascia. Cavity opened and mixed liquid and old hematoma evacuated. Posterior wall of cyst overlying prior sutures from hernia repair. Wound and cavity pulse irrigated. Fascia overlying sutures plicated using v-lock suture. Closed in layers using 2-0 and 3-0 Vicryl. Skin closed with 4-0 Monocryl and Dermabond.

## 2023-10-05 NOTE — PROGRESS NOTE ADULT - SUBJECTIVE AND OBJECTIVE BOX
POST OP CHECK    Procedure: Excision of hematoma capsule and evacuation of hematoma   Surgeon: Dr. Ferreira    S: Pt seen and examined at bedside. Reports no acute complaints. Post op labs appreciated. Denies F, N, V, CP, SOB, LORENZO, calf tenderness. HD stable.    O:  T(C): 36.3 (10-05-23 @ 15:15), Max: 36.3 (10-05-23 @ 15:15)  T(F): 97.3 (10-05-23 @ 15:15), Max: 97.3 (10-05-23 @ 15:15)  HR: 68 (10-05-23 @ 15:45) (67 - 78)  BP: 130/58 (10-05-23 @ 15:45) (90/55 - 130/58)  RR: 18 (10-05-23 @ 15:45) (14 - 27)  SpO2: 98% (10-05-23 @ 15:45) (94% - 99%)  Wt(kg): --                        11.2   7.28  )-----------( 173      ( 05 Oct 2023 12:57 )             33.7     10-05    137  |  105  |  36<H>  ----------------------------<  131<H>  4.2   |  24  |  1.74<H>    Ca    8.8      05 Oct 2023 12:57  Phos  4.5     10-05  Mg     1.7     10-05    Gen: NAD, resting comfortably in bed, A&O x3  C/V: NSR  Pulm: Nonlabored breathing, no respiratory distress  Abd: abdominal binder in place, abdomen soft, nd, appropriately ttp to surgical site. Dressings c/d/i.   Extrem: WWP, no calf tenderness or edema, SCDs in place    A/P: 73M with PMH/PSH CAD (3 stents, on plavix last dose 10d ago), T2DM, dementia, depression, DVT (not active, not on AC), GERD, BPH, renal cancer s/p partial nephrectomy with ?CKD (baseline Cr: 1.8), lung cancer s/p lung surgery, open repair of incarcerated ventral hernia with mesh, including bilateral component separation (7/2023) who presents for anterior abdominal wall hematoma now s/p excision of hematoma capsule and evacutation of hematoma 10/5.     Regular CC   No IVF   Pain control with RTC tylenol, no toradol, avoid narcotics  Home meds as appropriate   Abdominal binder + compression dressing for 24 hours   SCDs, IS, OROOSEVELT, SQH

## 2023-10-06 ENCOUNTER — TRANSCRIPTION ENCOUNTER (OUTPATIENT)
Age: 73
End: 2023-10-06

## 2023-10-06 VITALS — TEMPERATURE: 98 F

## 2023-10-06 LAB
GLUCOSE BLDC GLUCOMTR-MCNC: 135 MG/DL — HIGH (ref 70–99)
GLUCOSE BLDC GLUCOMTR-MCNC: 136 MG/DL — HIGH (ref 70–99)

## 2023-10-06 PROCEDURE — 88305 TISSUE EXAM BY PATHOLOGIST: CPT

## 2023-10-06 PROCEDURE — 82010 KETONE BODYS QUAN: CPT

## 2023-10-06 PROCEDURE — 84100 ASSAY OF PHOSPHORUS: CPT

## 2023-10-06 PROCEDURE — 85027 COMPLETE CBC AUTOMATED: CPT

## 2023-10-06 PROCEDURE — 36415 COLL VENOUS BLD VENIPUNCTURE: CPT

## 2023-10-06 PROCEDURE — 83735 ASSAY OF MAGNESIUM: CPT

## 2023-10-06 PROCEDURE — 82803 BLOOD GASES ANY COMBINATION: CPT

## 2023-10-06 PROCEDURE — 82962 GLUCOSE BLOOD TEST: CPT

## 2023-10-06 PROCEDURE — 80048 BASIC METABOLIC PNL TOTAL CA: CPT

## 2023-10-06 RX ORDER — OXYCODONE HYDROCHLORIDE 5 MG/1
5 TABLET ORAL ONCE
Refills: 0 | Status: DISCONTINUED | OUTPATIENT
Start: 2023-10-06 | End: 2023-10-06

## 2023-10-06 RX ADMIN — CARVEDILOL PHOSPHATE 25 MILLIGRAM(S): 80 CAPSULE, EXTENDED RELEASE ORAL at 10:00

## 2023-10-06 RX ADMIN — OXYCODONE HYDROCHLORIDE 5 MILLIGRAM(S): 5 TABLET ORAL at 01:27

## 2023-10-06 RX ADMIN — HEPARIN SODIUM 5000 UNIT(S): 5000 INJECTION INTRAVENOUS; SUBCUTANEOUS at 05:38

## 2023-10-06 RX ADMIN — PANTOPRAZOLE SODIUM 40 MILLIGRAM(S): 20 TABLET, DELAYED RELEASE ORAL at 06:03

## 2023-10-06 RX ADMIN — Medication 650 MILLIGRAM(S): at 09:08

## 2023-10-06 RX ADMIN — LIDOCAINE 1 PATCH: 4 CREAM TOPICAL at 07:27

## 2023-10-06 RX ADMIN — Medication 150 MILLIGRAM(S): at 09:59

## 2023-10-06 RX ADMIN — Medication 650 MILLIGRAM(S): at 07:54

## 2023-10-06 RX ADMIN — AMLODIPINE BESYLATE 10 MILLIGRAM(S): 2.5 TABLET ORAL at 05:01

## 2023-10-06 RX ADMIN — LIDOCAINE 1 PATCH: 4 CREAM TOPICAL at 05:39

## 2023-10-06 RX ADMIN — Medication 1 MILLIGRAM(S): at 00:03

## 2023-10-06 RX ADMIN — OXYCODONE HYDROCHLORIDE 5 MILLIGRAM(S): 5 TABLET ORAL at 01:49

## 2023-10-06 NOTE — DISCHARGE NOTE NURSING/CASE MANAGEMENT/SOCIAL WORK - PATIENT PORTAL LINK FT
You can access the FollowMyHealth Patient Portal offered by [facility] by registering at the following website: [email or web address]. By joining [facility]’s FollowMyHealth portal, you will also be able to view your health information using other applications (apps) compatible with our system.

## 2023-10-12 LAB — SURGICAL PATHOLOGY STUDY: SIGNIFICANT CHANGE UP

## 2023-11-08 ENCOUNTER — NON-APPOINTMENT (OUTPATIENT)
Age: 73
End: 2023-11-08

## 2023-11-08 DIAGNOSIS — Z86.39 PERSONAL HISTORY OF OTHER ENDOCRINE, NUTRITIONAL AND METABOLIC DISEASE: ICD-10-CM

## 2023-11-08 DIAGNOSIS — Z86.79 PERSONAL HISTORY OF OTHER DISEASES OF THE CIRCULATORY SYSTEM: ICD-10-CM

## 2023-11-09 ENCOUNTER — RX RENEWAL (OUTPATIENT)
Age: 73
End: 2023-11-09

## 2023-11-14 ENCOUNTER — APPOINTMENT (OUTPATIENT)
Dept: INTERNAL MEDICINE | Facility: CLINIC | Age: 73
End: 2023-11-14
Payer: MEDICARE

## 2023-11-14 VITALS
RESPIRATION RATE: 15 BRPM | HEART RATE: 64 BPM | OXYGEN SATURATION: 99 % | SYSTOLIC BLOOD PRESSURE: 130 MMHG | BODY MASS INDEX: 33.32 KG/M2 | DIASTOLIC BLOOD PRESSURE: 70 MMHG | WEIGHT: 200 LBS | HEIGHT: 65 IN

## 2023-11-14 PROCEDURE — 36415 COLL VENOUS BLD VENIPUNCTURE: CPT

## 2023-11-14 PROCEDURE — 90662 IIV NO PRSV INCREASED AG IM: CPT

## 2023-11-14 PROCEDURE — G0008: CPT | Mod: 59

## 2023-11-14 PROCEDURE — 99214 OFFICE O/P EST MOD 30 MIN: CPT | Mod: 25

## 2023-11-16 LAB
ALBUMIN SERPL ELPH-MCNC: 4.1 G/DL
ALP BLD-CCNC: 135 U/L
ALT SERPL-CCNC: 13 U/L
ANION GAP SERPL CALC-SCNC: 13 MMOL/L
APPEARANCE: CLEAR
AST SERPL-CCNC: 18 U/L
BASOPHILS # BLD AUTO: 0.06 K/UL
BASOPHILS NFR BLD AUTO: 0.8 %
BILIRUB SERPL-MCNC: 0.3 MG/DL
BILIRUBIN URINE: NEGATIVE
BLOOD URINE: NEGATIVE
BUN SERPL-MCNC: 35 MG/DL
CALCIUM SERPL-MCNC: 9.3 MG/DL
CHLORIDE SERPL-SCNC: 106 MMOL/L
CHOLEST SERPL-MCNC: 123 MG/DL
CO2 SERPL-SCNC: 25 MMOL/L
COLOR: YELLOW
CREAT SERPL-MCNC: 1.68 MG/DL
CREAT SPEC-SCNC: 87 MG/DL
EGFR: 43 ML/MIN/1.73M2
EOSINOPHIL # BLD AUTO: 0.24 K/UL
EOSINOPHIL NFR BLD AUTO: 3.2 %
ESTIMATED AVERAGE GLUCOSE: 137 MG/DL
GLUCOSE QUALITATIVE U: >=1000 MG/DL
GLUCOSE SERPL-MCNC: 97 MG/DL
HBA1C MFR BLD HPLC: 6.4 %
HCT VFR BLD CALC: 40.7 %
HDLC SERPL-MCNC: 46 MG/DL
HGB BLD-MCNC: 12.8 G/DL
IMM GRANULOCYTES NFR BLD AUTO: 0.3 %
KETONES URINE: NEGATIVE MG/DL
LDLC SERPL CALC-MCNC: 57 MG/DL
LEUKOCYTE ESTERASE URINE: NEGATIVE
LYMPHOCYTES # BLD AUTO: 1.47 K/UL
LYMPHOCYTES NFR BLD AUTO: 19.4 %
MAN DIFF?: NORMAL
MCHC RBC-ENTMCNC: 28.7 PG
MCHC RBC-ENTMCNC: 31.4 GM/DL
MCV RBC AUTO: 91.3 FL
MICROALBUMIN 24H UR DL<=1MG/L-MCNC: 8.8 MG/DL
MICROALBUMIN/CREAT 24H UR-RTO: 101 MG/G
MONOCYTES # BLD AUTO: 0.62 K/UL
MONOCYTES NFR BLD AUTO: 8.2 %
NEUTROPHILS # BLD AUTO: 5.16 K/UL
NEUTROPHILS NFR BLD AUTO: 68.1 %
NITRITE URINE: NEGATIVE
NONHDLC SERPL-MCNC: 76 MG/DL
PH URINE: 5.5
PLATELET # BLD AUTO: 192 K/UL
POTASSIUM SERPL-SCNC: 4.7 MMOL/L
PROT SERPL-MCNC: 6.4 G/DL
PROTEIN URINE: NORMAL MG/DL
RBC # BLD: 4.46 M/UL
RBC # FLD: 14.7 %
SODIUM SERPL-SCNC: 143 MMOL/L
SPECIFIC GRAVITY URINE: 1.03
TRIGL SERPL-MCNC: 106 MG/DL
UROBILINOGEN URINE: 0.2 MG/DL
WBC # FLD AUTO: 7.57 K/UL

## 2023-12-05 ENCOUNTER — APPOINTMENT (OUTPATIENT)
Dept: OTOLARYNGOLOGY | Facility: CLINIC | Age: 73
End: 2023-12-05
Payer: MEDICARE

## 2023-12-05 VITALS
SYSTOLIC BLOOD PRESSURE: 137 MMHG | BODY MASS INDEX: 33.32 KG/M2 | OXYGEN SATURATION: 96 % | HEART RATE: 69 BPM | WEIGHT: 200 LBS | DIASTOLIC BLOOD PRESSURE: 83 MMHG | HEIGHT: 65 IN | TEMPERATURE: 97.3 F

## 2023-12-05 PROCEDURE — 31579 LARYNGOSCOPY TELESCOPIC: CPT

## 2023-12-05 PROCEDURE — 99215 OFFICE O/P EST HI 40 MIN: CPT | Mod: 25

## 2024-02-05 ENCOUNTER — APPOINTMENT (OUTPATIENT)
Dept: INTERNAL MEDICINE | Facility: CLINIC | Age: 74
End: 2024-02-05
Payer: MEDICARE

## 2024-02-05 VITALS
HEART RATE: 83 BPM | DIASTOLIC BLOOD PRESSURE: 72 MMHG | BODY MASS INDEX: 32.99 KG/M2 | WEIGHT: 198 LBS | OXYGEN SATURATION: 99 % | RESPIRATION RATE: 14 BRPM | SYSTOLIC BLOOD PRESSURE: 131 MMHG | HEIGHT: 65 IN | TEMPERATURE: 98.1 F

## 2024-02-05 DIAGNOSIS — F03.90 UNSPECIFIED DEMENTIA W/OUT BEHAVIORAL DISTURBANCE: ICD-10-CM

## 2024-02-05 DIAGNOSIS — E11.9 TYPE 2 DIABETES MELLITUS W/OUT COMPLICATIONS: ICD-10-CM

## 2024-02-05 PROCEDURE — 99214 OFFICE O/P EST MOD 30 MIN: CPT

## 2024-02-05 PROCEDURE — 36415 COLL VENOUS BLD VENIPUNCTURE: CPT

## 2024-02-05 NOTE — ASSESSMENT
[FreeTextEntry1] : CT brain to evaluate volume loss  advised d/c alprazolam and possible consider edible THC for anxiety and sleep aid  f/u in 3 months.

## 2024-02-05 NOTE — HISTORY OF PRESENT ILLNESS
[FreeTextEntry1] :  Patient here for periodic assessment and blood work. [de-identified] :  Patient here for periodic assessment and blood work. also here c/o worse memory loss, mainly short term memory loss.

## 2024-02-06 ENCOUNTER — APPOINTMENT (OUTPATIENT)
Dept: OTOLARYNGOLOGY | Facility: CLINIC | Age: 74
End: 2024-02-06
Payer: MEDICARE

## 2024-02-06 VITALS
TEMPERATURE: 98.1 F | WEIGHT: 198 LBS | SYSTOLIC BLOOD PRESSURE: 143 MMHG | DIASTOLIC BLOOD PRESSURE: 71 MMHG | HEIGHT: 65 IN | BODY MASS INDEX: 32.99 KG/M2

## 2024-02-06 PROCEDURE — 31579 LARYNGOSCOPY TELESCOPIC: CPT

## 2024-02-06 PROCEDURE — 99215 OFFICE O/P EST HI 40 MIN: CPT | Mod: 25

## 2024-02-06 NOTE — ASSESSMENT
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 This was first dx in 2011 and treated with XRT. Since the last visit he was diagnosed with a left lung cancer which is already resected without need for chemo/XRT. Patient has recovered well. PET/CT was done at that time and patient have those results sent to me for review.  Currently, overall patient is stable and doing well. No new symptoms or concern for recurrence. Exam is consistent with post XRT changes to the larynx, today there was a raised white plaque left anterior vocal fold 2 mm. At this time I am recommending follow-up in 2 months. Should this persist we will plan for operative biopsy he knows to follow-up sooner should symptoms worsen or change. Evidence of supraglottic compression and speech evaluation offered. Pt would like to observe for now. Follow up 6 mo.  10/3/2023 patient overall stable. Exam today shows resolution of previous lesions noted on the vocal folds.. At this time, I am recommending canceling surgery and follow up in 2 months for repeat evaluation.  2/6/24: No changes in symptoms.  No new ear, nose, throat symptoms.  Exam from previous remains unchanged.  Vocal folds with postradiation changes but no new masses or lesions.  At this time I am recommending follow-up in 2 months, sooner should symptoms worsen or fail to improve.   - fu 2 months for repeat evaluation - fu sooner if symptoms worsen or fail to improve

## 2024-02-06 NOTE — HISTORY OF PRESENT ILLNESS
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 This was first dx in 2011 and treated with XRT. In 2016 and again in 2017 he underwent rebiopsy for suspicious lesions concerning for recurrence, both times negative. Today he notes no issues or changes in terms of chewing, eating or swallowing. No voice changes and no breathing issues. He denies any issues with fevers, night sweats, or unplanned weight loss. No referred otalgia. No new neck masses, or lesions. He actually notes voice improvement. 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  Former smoker, quit several years ago. - Update 5/17/22 Patient with patient is overall stable and well. No changes to his voice. No issues chewing, eating, or swallowing. No breathing issues. He has noted some laryngitis over the past few days as he has been raising his voice at home but otherwise he has been normal. He denies any fevers, night sweats, weight loss, referred otalgia, new neck masses. He denies any new ear, nose, throat symptoms otherwise. - Interval History: 11/15/22 At the time of the last visit I requested a chest x-ray which was not completed. The following month he actually did have a CT chest and was ultimately diagnosed with lung cancer, left upper lobe. The patient underwent resection without need for chemotherapy or XRT. Per the patient he is now cancer free. During that work-up he did complete a PET/CT and this was negative for any disease in the neck.  Patient with patient is overall stable and well. No changes to his voice. No issues chewing, eating, or swallowing. No breathing issues. He denies any fevers, night sweats, weight loss, referred otalgia, new neck masses. He denies any new ear, nose, throat symptoms otherwise. - 4/13/23 Pt overall well and stable. For the past month, he reports sensation of something stuck on the right side of his throat. He feels this when he's swallowing. Denies difficulty chewing, eating or swallowing. No breathing issues. No voice changes. No fevers, night sweats, weight loss. -   Interval History: 5/23/2023 Previous visit a vocal lesion was noted and patient presents for surveillance. No issues chewing, eating, swallowing. No changes in voice or breathing issues. No fevers night sweats weight loss or referred otalgia. No new ear, nose, throat symptoms otherwise. - 8/1/23 Pt presents today for follow up of vocal fold lesion. No issues chewing, eating, swallowing. No changes in voice or breathing issues. No fevers night sweats weight loss or referred otalgia. No new ear, nose, throat symptoms otherwise. - 10/3/23 Pt presents today for follow up of vocal fold lesion. No issues chewing, eating, swallowing. No changes in voice or breathing issues. No fevers night sweats weight loss or referred otalgia. No new ear, nose, throat symptoms otherwise.  Did have recent imaging of his lungs, but CT scan was not sent to me for review. - 12/5/23 Overall stable and well.  No new ear, nose, throat symptoms.  No fevers, night sweats, weight loss.  He is not smoking.  No new ear, nose, throat symptoms otherwise. - [FreeTextEntry1] : 2/6/24: Overall stable and well.  No new ear, nose, throat symptoms.  No fevers, night sweats, weight loss.  He is not smoking.  No new ear, nose, throat symptoms otherwise.

## 2024-02-06 NOTE — PHYSICAL EXAM
[Normal] : mucosa is normal [Midline] : trachea located in midline position [Laryngoscopy Performed] : laryngoscopy was performed, see procedure section for findings [FreeTextEntry1] : normal voice, mild hoarseness, mildly reduced range. adequate projection

## 2024-02-06 NOTE — PROCEDURE
[de-identified] :  - Procedure Note Pre-operative Diagnosis: hx of left vocal fold cancer Post-operative Diagnosis: Previously diagnosed lesions now resolved, post XRT changes noted. Anesthesia: Topical - 1 % Lidocaine/Phenylephrine  Procedure: Flexible Laryngoscopy with Stroboscopy  Procedure Details:  The patient was placed in the sitting position. After decongestant and anesthesia were applied the laryngoscope was passed. The nasal cavities, nasopharynx, oropharynx, hypopharynx, and larynx were all examined. Vocal folds were examined during respiration and phonation. The following findings were noted:    Findings: Nose: Septum is midline, turbinates are normal, nasal airways patent, mucosa normal Nasopharynx: Adenoids normal, no masses, eustachian tube normal Oropharynx: Pharyngeal walls symmetric and without lesion. Tonsils/fossae symmetric Hypopharynx: Hypopharynx and pyriform sinuses without lesion. No masses or asymmetry. No pooling of secretions. Larynx: Epiglottis and aryepiglottic folds were sharp and crisp bilaterally. Bilateral false vocal folds normal appearance. Airway was widely patent.   Strobe Exam Ratings  TVF Appearance: mild edema and scarring, worse on the right post VF, post XRT changes TVF Mobility: reduced abduction, but airway patent Edema/hypertrophy: mild Mucus on TVF: normal Glottic Closure: adequate Mucosal Wave: reduced Amplitude of Vibration: reduced Phase: symmetric Supraglottic Hyperfunction: +supraglottic compression  Other Findings: none Condition: Stable. Patient tolerated procedure well. Complications: None.

## 2024-02-12 LAB
ALBUMIN SERPL ELPH-MCNC: 4.3 G/DL
ALP BLD-CCNC: 126 U/L
ALT SERPL-CCNC: 18 U/L
ANION GAP SERPL CALC-SCNC: 13 MMOL/L
APPEARANCE: CLEAR
AST SERPL-CCNC: 20 U/L
BACTERIA: NEGATIVE /HPF
BASOPHILS # BLD AUTO: 0.07 K/UL
BASOPHILS NFR BLD AUTO: 0.9 %
BILIRUB SERPL-MCNC: 0.4 MG/DL
BILIRUBIN URINE: NEGATIVE
BLOOD URINE: NEGATIVE
BUN SERPL-MCNC: 36 MG/DL
CALCIUM SERPL-MCNC: 9.2 MG/DL
CAST: 0 /LPF
CHLORIDE SERPL-SCNC: 102 MMOL/L
CHOLEST SERPL-MCNC: 132 MG/DL
CO2 SERPL-SCNC: 25 MMOL/L
COLOR: YELLOW
CREAT SERPL-MCNC: 1.8 MG/DL
CREAT SPEC-SCNC: 100 MG/DL
EGFR: 39 ML/MIN/1.73M2
EOSINOPHIL # BLD AUTO: 0.24 K/UL
EOSINOPHIL NFR BLD AUTO: 3.1 %
EPITHELIAL CELLS: 1 /HPF
ESTIMATED AVERAGE GLUCOSE: 131 MG/DL
GLUCOSE QUALITATIVE U: >=1000 MG/DL
GLUCOSE SERPL-MCNC: 119 MG/DL
HBA1C MFR BLD HPLC: 6.2 %
HCT VFR BLD CALC: 40.5 %
HDLC SERPL-MCNC: 51 MG/DL
HGB BLD-MCNC: 13.1 G/DL
IMM GRANULOCYTES NFR BLD AUTO: 0.4 %
KETONES URINE: NEGATIVE MG/DL
LDLC SERPL CALC-MCNC: 60 MG/DL
LEUKOCYTE ESTERASE URINE: NEGATIVE
LYMPHOCYTES # BLD AUTO: 1.93 K/UL
LYMPHOCYTES NFR BLD AUTO: 25.1 %
MAN DIFF?: NORMAL
MCHC RBC-ENTMCNC: 29.8 PG
MCHC RBC-ENTMCNC: 32.3 GM/DL
MCV RBC AUTO: 92.3 FL
MICROALBUMIN 24H UR DL<=1MG/L-MCNC: 9.1 MG/DL
MICROALBUMIN/CREAT 24H UR-RTO: 91 MG/G
MICROSCOPIC-UA: NORMAL
MONOCYTES # BLD AUTO: 0.64 K/UL
MONOCYTES NFR BLD AUTO: 8.3 %
NEUTROPHILS # BLD AUTO: 4.77 K/UL
NEUTROPHILS NFR BLD AUTO: 62.2 %
NITRITE URINE: NEGATIVE
NONHDLC SERPL-MCNC: 81 MG/DL
PH URINE: 6
PLATELET # BLD AUTO: 190 K/UL
POTASSIUM SERPL-SCNC: 5 MMOL/L
PROT SERPL-MCNC: 6.6 G/DL
PROTEIN URINE: NORMAL MG/DL
RBC # BLD: 4.39 M/UL
RBC # FLD: 14.8 %
RED BLOOD CELLS URINE: 0 /HPF
SODIUM SERPL-SCNC: 139 MMOL/L
SPECIFIC GRAVITY URINE: 1.02
TRIGL SERPL-MCNC: 112 MG/DL
UROBILINOGEN URINE: 0.2 MG/DL
WBC # FLD AUTO: 7.68 K/UL
WHITE BLOOD CELLS URINE: 2 /HPF

## 2024-02-15 ENCOUNTER — APPOINTMENT (OUTPATIENT)
Dept: ORTHOPEDIC SURGERY | Facility: CLINIC | Age: 74
End: 2024-02-15
Payer: MEDICARE

## 2024-02-15 VITALS — WEIGHT: 198 LBS | BODY MASS INDEX: 32.99 KG/M2 | HEIGHT: 65 IN

## 2024-02-15 PROCEDURE — 73564 X-RAY EXAM KNEE 4 OR MORE: CPT | Mod: RT

## 2024-02-15 PROCEDURE — 99205 OFFICE O/P NEW HI 60 MIN: CPT

## 2024-02-15 NOTE — HISTORY OF PRESENT ILLNESS
[de-identified] : This is very nice 73-year-old gentleman experiencing right knee pain, which is severe in intensity. The pain substantially limits activities of daily living. Walking tolerance is reduced. Medication including NSAIDs, PRP injections, hyaluronic acid injections, steroid injections and activity modification have been minimally effective for a period lasting greater than three months in duration. Assistive devices and external support were not deemed by the patient to be helpful in improving their function. Due to the severity of osteoarthritis and level of pain, physical therapy is contraindicated. Pain and restriction of function are intolerable at this time. The patient denies any radiation of the pain to the feet and it is not associated with numbness, tingling, or weakness.

## 2024-02-15 NOTE — DISCUSSION/SUMMARY
[de-identified] : This patient has severe right knee osteoarthritis.  He has failed a course of conservative management and would like to proceed with a right total knee arthroplasty using robotic navigation for assistance.  The patient is an appropriate candidate for consideration of right total knee replacement. An extensive discussion was conducted of the natural history of the disease and the variety of surgical and non-surgical treatment options available to the patient. A risk/benefit analysis was discussed with the patient reviewing the advantages and disadvantages of surgical intervention at this time. Both the level and length of the patient's pain have made additional conservative treatment measures consisting of NSAIDs, physical therapy, corticosteroids, and/or viscosupplementation contraindicated. A full explanation was given of the nature and the purpose of the procedure and anesthesia, its benefits, possible alternative methods of diagnosis or treatment, the risks involved, the possibility of complications, the foreseeable consequences of the procedure and the possible results of the non-treatment. I reviewed the plan of care as well as used a model of a total knee implant equivalent to the one that will be used for their total knee joint replacement. The ability to secure the implant utilizing cement or cementless (press-fit) was discussed with the patient. The patient agrees with the plan of care, as well as the use of implants for their total knee replacement.   We also discussed that if robotic/computer navigation is utilized, then additional incisions may need to be made to accommodate the computer navigation arrays, which will be placed in the femur and tibia.   The patient has been counseled regarding the elevated blood sugar levels as measured by random blood glucose levels >150 and/or HgA1c >7.0.  I explained to the patient the risk of uncontrolled or poorly controlled diabetes, which is well documented in the orthopedic literature as increasing risks of wound breakdown (dehiscence), drainage, periprosthetic joint infection, dissatisfaction, and impaired overall outcome to the patient. The patient demonstrates a profound understanding of the increased risk. Endocrinology referral, methods of monitoring nutrition intake and monitoring blood sugar levels, and need for taking diabetes medications were discussed with the patient. After a lengthy discussion, the patient agreed to make a coordinated effort at maintaining and improving diabetes control. The patient understands our diabetes policy and if they are planning surgical intervention, then they will need to maintain their HgA1c below 7.5 in order to proceed and they are agreeable to this.  No guarantee or assurance was made as to the results that may be obtained. Specifically, the risks were identified to include, but are not limited to the following: Infection, phlebitis, pulmonary embolism, death, paralysis, dislocation, pain, stiffness, instability, limp, weakness, breakage, leg-length inequality, uncontrolled bleeding, nerve injury, blood vessel injury, pressure sores, anesthetic risks, delayed healing of wound and bone, and wear and loosening. Further discussion was undertaken with the patient about the details of surgical preparation, treatment, and postoperative rehabilitation including medical clearance, autotransfusion, the hospital course, and the postoperative rehabilitation involved. All in all, I feel that this patient is a good candidate for surgical reconstruction.

## 2024-02-16 ENCOUNTER — APPOINTMENT (OUTPATIENT)
Dept: UROLOGY | Facility: CLINIC | Age: 74
End: 2024-02-16
Payer: MEDICARE

## 2024-02-16 VITALS
TEMPERATURE: 98.2 F | HEART RATE: 69 BPM | SYSTOLIC BLOOD PRESSURE: 142 MMHG | RESPIRATION RATE: 14 BRPM | DIASTOLIC BLOOD PRESSURE: 78 MMHG | OXYGEN SATURATION: 98 %

## 2024-02-16 PROCEDURE — 99214 OFFICE O/P EST MOD 30 MIN: CPT

## 2024-02-16 PROCEDURE — 51798 US URINE CAPACITY MEASURE: CPT

## 2024-02-19 ENCOUNTER — NON-APPOINTMENT (OUTPATIENT)
Age: 74
End: 2024-02-19

## 2024-02-19 NOTE — ASSESSMENT
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

## 2024-02-19 NOTE — HISTORY OF PRESENT ILLNESS
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

## 2024-02-20 RX ORDER — VENLAFAXINE HYDROCHLORIDE 150 MG/1
150 CAPSULE, EXTENDED RELEASE ORAL DAILY
Qty: 30 | Refills: 3 | Status: ACTIVE | COMMUNITY
Start: 2023-08-03 | End: 1900-01-01

## 2024-02-21 LAB
PSA FREE FLD-MCNC: 50 %
PSA FREE SERPL-MCNC: 0.99 NG/ML
PSA SERPL-MCNC: 1.96 NG/ML

## 2024-02-21 RX ORDER — DULAGLUTIDE 4.5 MG/.5ML
4.5 INJECTION, SOLUTION SUBCUTANEOUS
Qty: 3 | Refills: 1 | Status: ACTIVE | COMMUNITY
Start: 2024-02-20 | End: 1900-01-01

## 2024-02-21 RX ORDER — CARVEDILOL 6.25 MG/1
6.25 TABLET, FILM COATED ORAL TWICE DAILY
Qty: 180 | Refills: 1 | Status: ACTIVE | COMMUNITY
Start: 2023-08-03

## 2024-02-26 ENCOUNTER — NON-APPOINTMENT (OUTPATIENT)
Age: 74
End: 2024-02-26

## 2024-03-04 ENCOUNTER — APPOINTMENT (OUTPATIENT)
Dept: INTERNAL MEDICINE | Facility: CLINIC | Age: 74
End: 2024-03-04
Payer: MEDICARE

## 2024-03-04 VITALS
SYSTOLIC BLOOD PRESSURE: 138 MMHG | TEMPERATURE: 97.3 F | RESPIRATION RATE: 14 BRPM | HEART RATE: 80 BPM | BODY MASS INDEX: 33.32 KG/M2 | HEIGHT: 65 IN | DIASTOLIC BLOOD PRESSURE: 80 MMHG | OXYGEN SATURATION: 97 % | WEIGHT: 200 LBS

## 2024-03-04 DIAGNOSIS — J06.9 ACUTE UPPER RESPIRATORY INFECTION, UNSPECIFIED: ICD-10-CM

## 2024-03-04 PROCEDURE — 99213 OFFICE O/P EST LOW 20 MIN: CPT

## 2024-03-08 ENCOUNTER — APPOINTMENT (OUTPATIENT)
Dept: UROLOGY | Facility: CLINIC | Age: 74
End: 2024-03-08
Payer: MEDICARE

## 2024-03-08 VITALS
HEART RATE: 73 BPM | DIASTOLIC BLOOD PRESSURE: 74 MMHG | OXYGEN SATURATION: 98 % | SYSTOLIC BLOOD PRESSURE: 133 MMHG | TEMPERATURE: 98.3 F | RESPIRATION RATE: 14 BRPM

## 2024-03-08 DIAGNOSIS — C64.9 MALIGNANT NEOPLASM OF UNSPECIFIED KIDNEY, EXCEPT RENAL PELVIS: ICD-10-CM

## 2024-03-08 DIAGNOSIS — N40.1 BENIGN PROSTATIC HYPERPLASIA WITH LOWER URINARY TRACT SYMPMS: ICD-10-CM

## 2024-03-08 DIAGNOSIS — N13.8 BENIGN PROSTATIC HYPERPLASIA WITH LOWER URINARY TRACT SYMPMS: ICD-10-CM

## 2024-03-08 PROCEDURE — 99214 OFFICE O/P EST MOD 30 MIN: CPT

## 2024-03-08 NOTE — ASSESSMENT
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  Renal and Bladder US 2/26/24 bilateral renal cysts increased renal echogenicity - medical renal disease thick walled bladder enlarged prostate - 49 gram prostate  3 - Lung Cancer 2022 (s/p upper lobe pneumonectomy)  the patient is doing well offers no complaints  last CT - 7/2023 - post surgical changes 07/2023 last MRI - patient remains SAMIRA - 10/31/2022  PSA - 1.96 ng/ml 02/2024 PSA - 3.32 ng/ml 03/2023 PSA - 4.03 ng/ml 12/1/2022 PSA - 7.70 ng/ml 09/22/2022 PSA - 3.79 ng/ml 11/23/2021 PSA - 3.87 ng/ml 08/12/2021 PSA 3.76 ng/ml 03/23/2021 PSA 4.15 ng/ml 02/23/2021  Plan 74 yo with BPH and prior renal cell cancer - s/p partial nephrectomy doing well offers no new complaints reviewed the US in detail and medication review performed and plan of care confirmed  - renal and bladder US  - f/u in 6 months to re-assess

## 2024-03-08 NOTE — HISTORY OF PRESENT ILLNESS
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

## 2024-03-08 NOTE — PHYSICAL EXAM
[General Appearance - Well Developed] : well developed [General Appearance - Well Nourished] : well nourished [Well Groomed] : well groomed [Normal Appearance] : normal appearance [General Appearance - In No Acute Distress] : no acute distress [Abdomen Soft] : soft [Costovertebral Angle Tenderness] : no ~M costovertebral angle tenderness [Abdomen Tenderness] : non-tender [Edema] : no peripheral edema [] : no respiratory distress [Exaggerated Use Of Accessory Muscles For Inspiration] : no accessory muscle use [Respiration, Rhythm And Depth] : normal respiratory rhythm and effort [Oriented To Time, Place, And Person] : oriented to person, place, and time [Affect] : the affect was normal [Not Anxious] : not anxious [Mood] : the mood was normal [Normal Station and Gait] : the gait and station were normal for the patient's age [No Focal Deficits] : no focal deficits [No Palpable Adenopathy] : no palpable adenopathy

## 2024-03-11 ENCOUNTER — APPOINTMENT (OUTPATIENT)
Dept: INTERNAL MEDICINE | Facility: CLINIC | Age: 74
End: 2024-03-11

## 2024-03-12 ENCOUNTER — APPOINTMENT (OUTPATIENT)
Dept: HEART AND VASCULAR | Facility: CLINIC | Age: 74
End: 2024-03-12
Payer: MEDICARE

## 2024-03-12 DIAGNOSIS — Z01.812 ENCOUNTER FOR PREPROCEDURAL LABORATORY EXAMINATION: ICD-10-CM

## 2024-03-12 DIAGNOSIS — Z11.52 ENCOUNTER FOR PREPROCEDURAL LABORATORY EXAMINATION: ICD-10-CM

## 2024-03-12 PROCEDURE — 36415 COLL VENOUS BLD VENIPUNCTURE: CPT

## 2024-03-14 ENCOUNTER — OUTPATIENT (OUTPATIENT)
Dept: OUTPATIENT SERVICES | Facility: HOSPITAL | Age: 74
LOS: 1 days | End: 2024-03-14
Payer: MEDICARE

## 2024-03-14 VITALS
SYSTOLIC BLOOD PRESSURE: 127 MMHG | RESPIRATION RATE: 18 BRPM | TEMPERATURE: 98 F | WEIGHT: 201.06 LBS | DIASTOLIC BLOOD PRESSURE: 79 MMHG | HEIGHT: 64 IN | OXYGEN SATURATION: 98 % | HEART RATE: 70 BPM

## 2024-03-14 DIAGNOSIS — Z29.9 ENCOUNTER FOR PROPHYLACTIC MEASURES, UNSPECIFIED: ICD-10-CM

## 2024-03-14 DIAGNOSIS — K42.9 UMBILICAL HERNIA WITHOUT OBSTRUCTION OR GANGRENE: Chronic | ICD-10-CM

## 2024-03-14 DIAGNOSIS — I25.10 ATHEROSCLEROTIC HEART DISEASE OF NATIVE CORONARY ARTERY WITHOUT ANGINA PECTORIS: ICD-10-CM

## 2024-03-14 DIAGNOSIS — Z98.890 OTHER SPECIFIED POSTPROCEDURAL STATES: Chronic | ICD-10-CM

## 2024-03-14 DIAGNOSIS — M17.11 UNILATERAL PRIMARY OSTEOARTHRITIS, RIGHT KNEE: ICD-10-CM

## 2024-03-14 DIAGNOSIS — M19.90 UNSPECIFIED OSTEOARTHRITIS, UNSPECIFIED SITE: ICD-10-CM

## 2024-03-14 DIAGNOSIS — Z01.818 ENCOUNTER FOR OTHER PREPROCEDURAL EXAMINATION: ICD-10-CM

## 2024-03-14 DIAGNOSIS — E11.9 TYPE 2 DIABETES MELLITUS WITHOUT COMPLICATIONS: ICD-10-CM

## 2024-03-14 DIAGNOSIS — Z98.89 OTHER SPECIFIED POSTPROCEDURAL STATES: Chronic | ICD-10-CM

## 2024-03-14 DIAGNOSIS — Z90.49 ACQUIRED ABSENCE OF OTHER SPECIFIED PARTS OF DIGESTIVE TRACT: Chronic | ICD-10-CM

## 2024-03-14 DIAGNOSIS — Z96.652 PRESENCE OF LEFT ARTIFICIAL KNEE JOINT: Chronic | ICD-10-CM

## 2024-03-14 DIAGNOSIS — Z95.5 PRESENCE OF CORONARY ANGIOPLASTY IMPLANT AND GRAFT: Chronic | ICD-10-CM

## 2024-03-14 LAB
A1C WITH ESTIMATED AVERAGE GLUCOSE RESULT: 6.3 % — HIGH (ref 4–5.6)
ANION GAP SERPL CALC-SCNC: 13 MMOL/L — SIGNIFICANT CHANGE UP (ref 5–17)
BUN SERPL-MCNC: 46 MG/DL — HIGH (ref 7–23)
CALCIUM SERPL-MCNC: 9.3 MG/DL — SIGNIFICANT CHANGE UP (ref 8.4–10.5)
CHLORIDE SERPL-SCNC: 104 MMOL/L — SIGNIFICANT CHANGE UP (ref 96–108)
CO2 SERPL-SCNC: 21 MMOL/L — LOW (ref 22–31)
CREAT SERPL-MCNC: 1.78 MG/DL — HIGH (ref 0.5–1.3)
EGFR: 40 ML/MIN/1.73M2 — LOW
ESTIMATED AVERAGE GLUCOSE: 134 MG/DL — HIGH (ref 68–114)
GLUCOSE SERPL-MCNC: 117 MG/DL — HIGH (ref 70–99)
HCT VFR BLD CALC: 39.7 % — SIGNIFICANT CHANGE UP (ref 39–50)
HGB BLD-MCNC: 13.1 G/DL — SIGNIFICANT CHANGE UP (ref 13–17)
MCHC RBC-ENTMCNC: 29.2 PG — SIGNIFICANT CHANGE UP (ref 27–34)
MCHC RBC-ENTMCNC: 33 GM/DL — SIGNIFICANT CHANGE UP (ref 32–36)
MCV RBC AUTO: 88.6 FL — SIGNIFICANT CHANGE UP (ref 80–100)
NRBC # BLD: 0 /100 WBCS — SIGNIFICANT CHANGE UP (ref 0–0)
PLATELET # BLD AUTO: 187 K/UL — SIGNIFICANT CHANGE UP (ref 150–400)
POTASSIUM SERPL-MCNC: 4.2 MMOL/L — SIGNIFICANT CHANGE UP (ref 3.5–5.3)
POTASSIUM SERPL-SCNC: 4.2 MMOL/L — SIGNIFICANT CHANGE UP (ref 3.5–5.3)
RBC # BLD: 4.48 M/UL — SIGNIFICANT CHANGE UP (ref 4.2–5.8)
RBC # FLD: 14.1 % — SIGNIFICANT CHANGE UP (ref 10.3–14.5)
SODIUM SERPL-SCNC: 138 MMOL/L — SIGNIFICANT CHANGE UP (ref 135–145)
WBC # BLD: 7 K/UL — SIGNIFICANT CHANGE UP (ref 3.8–10.5)
WBC # FLD AUTO: 7 K/UL — SIGNIFICANT CHANGE UP (ref 3.8–10.5)

## 2024-03-14 PROCEDURE — 73700 CT LOWER EXTREMITY W/O DYE: CPT | Mod: 26,RT,MC

## 2024-03-14 RX ORDER — SODIUM CHLORIDE 9 MG/ML
3 INJECTION INTRAMUSCULAR; INTRAVENOUS; SUBCUTANEOUS EVERY 8 HOURS
Refills: 0 | Status: DISCONTINUED | OUTPATIENT
Start: 2024-04-04 | End: 2024-04-04

## 2024-03-14 RX ORDER — LIDOCAINE HCL 20 MG/ML
0.2 VIAL (ML) INJECTION ONCE
Refills: 0 | Status: DISCONTINUED | OUTPATIENT
Start: 2024-04-04 | End: 2024-04-04

## 2024-03-14 RX ORDER — VENLAFAXINE HCL 75 MG
1 CAPSULE, EXT RELEASE 24 HR ORAL
Refills: 0 | DISCHARGE

## 2024-03-14 RX ORDER — PANTOPRAZOLE SODIUM 20 MG/1
40 TABLET, DELAYED RELEASE ORAL ONCE
Refills: 0 | Status: COMPLETED | OUTPATIENT
Start: 2024-04-04 | End: 2024-04-04

## 2024-03-14 RX ORDER — CHLORHEXIDINE GLUCONATE 213 G/1000ML
1 SOLUTION TOPICAL DAILY
Refills: 0 | Status: DISCONTINUED | OUTPATIENT
Start: 2024-04-04 | End: 2024-04-04

## 2024-03-14 RX ORDER — MONTELUKAST 4 MG/1
1 TABLET, CHEWABLE ORAL
Qty: 0 | Refills: 0 | DISCHARGE

## 2024-03-14 RX ORDER — CEFAZOLIN SODIUM 1 G
2000 VIAL (EA) INJECTION ONCE
Refills: 0 | Status: COMPLETED | OUTPATIENT
Start: 2024-04-04 | End: 2024-04-04

## 2024-03-14 RX ORDER — CLOPIDOGREL BISULFATE 75 MG/1
1 TABLET, FILM COATED ORAL
Refills: 0 | DISCHARGE

## 2024-03-14 RX ORDER — TRAMADOL HYDROCHLORIDE 50 MG/1
50 TABLET ORAL ONCE
Refills: 0 | Status: DISCONTINUED | OUTPATIENT
Start: 2024-04-04 | End: 2024-04-04

## 2024-03-14 NOTE — H&P PST ADULT - NSICDXPASTMEDICALHX_GEN_ALL_CORE_FT
PAST MEDICAL HISTORY:  Acute adjustment disorder with mixed anxiety and depressed mood     Anemia     Anxiety and depression     BPH (benign prostatic hypertrophy)     CAD (coronary artery disease)     Current use of long term anticoagulation     Dementia     Depression     DM (diabetes mellitus) type 2    DVT, lower extremity     Gastritis     GERD (gastroesophageal reflux disease)     Gout     H/O CHF     History of BPH     History of throat cancer     HLD (hyperlipidemia)     HTN (hypertension)     Left kidney mass     Lower back pain radiating to right knee    Lung cancer     Neuropathy     Osteoarthritis      PAST MEDICAL HISTORY:  Anemia     Anxiety and depression     BPH (benign prostatic hypertrophy)     CAD (coronary artery disease)     Current use of long term anticoagulation     Dementia     Depression     DM (diabetes mellitus) type 2    DVT, lower extremity     Gastritis     GERD (gastroesophageal reflux disease)     Gout     H/O CHF     History of throat cancer     HLD (hyperlipidemia)     HTN (hypertension)     Left kidney mass     Lower back pain radiating to right knee    Lung cancer     Neuropathy     Osteoarthritis

## 2024-03-14 NOTE — H&P PST ADULT - PRO TOBACCO TYPE
A CT simulation was performed today for radiation therapy planning. See [facility]osaic for additional details.     Yuniel Escobar M.D.  Department of Radiation Oncology  [facility]     
cigarettes

## 2024-03-14 NOTE — H&P PST ADULT - ADDITIONAL PE
DASI score: 9.89  DASI activity: walks, exercises daily- denies cardiac symptoms   Loose teeth or denture: denies

## 2024-03-14 NOTE — H&P PST ADULT - NSICDXPASTSURGICALHX_GEN_ALL_CORE_FT
PAST SURGICAL HISTORY:  H/O heart artery stent x2    H/O total knee replacement, left     History of laryngoscopy     History of lung surgery 2022    S/P arthroscopy of knee 2016     PAST SURGICAL HISTORY:  H/O colonoscopy     H/O heart artery stent x2    H/O total knee replacement, left     History of back surgery     History of cholecystectomy     History of laryngoscopy     History of lung surgery 2022    S/P arthroscopy of knee 2016    Umbilical hernia

## 2024-03-14 NOTE — H&P PST ADULT - BP NONINVASIVE DIASTOLIC (MM HG)
[id number] mg daily.          
79

## 2024-03-14 NOTE — H&P PST ADULT - HISTORY OF PRESENT ILLNESS
Patient comes to PST for right total knee arthroplasty with maria luisa robot IVAC application with MD Dubois on 4/4/24. Patient has a pmhx of   patient reports   Patient comes to PST for right total knee arthroplasty with maria luisa robot IVAC application with MD Dubois on 4/4/24. Patient has a pmhx of DM, CAD x2 stents 2009, osteoarthritis, depression, anxiety, BPH, GERD, lung CA ( has follow up Ct scan 3/15/24 for 6 month follow up), kidney mass, HTN, HLD, throat Ca (last follow up 2/2024- stable)  patient reports for a few years was having bilateral knee pain. patient had left knee surgery done 12 years ago and no complaints since. patient reports over the last 5-6 months has been having increase to right knee pain. Patient did PRP 01/22/2024 with no relief. Patient has not done physical therapy for right knee. Reports taking tylenol with some relief. Reports walking around makes the pain worse. Reports when laying down pain subsides. Patient reports for work standing on feet for long period of time and has increase in pain. Patient denies any numbness or tingling to right leg. Patient denies any other complaints.

## 2024-03-14 NOTE — H&P PST ADULT - NSANTHTIREDRD_ENT_A_CORE
Patient ID: Daphney Muller is a 76 y o  male  Assessment/Plan:    Mixed sensory-motor polyneuropathy  Will increase Lyrica to 75 mg tid,, he is willing to try despite prior side effects    Asked pt to use Aspercreme to more frequently     May be beneficial for pruritus      f/u in 6 mths       Diagnoses and all orders for this visit:    Mixed sensory-motor polyneuropathy    Diabetic polyneuropathy associated with diabetes mellitus due to underlying condition (HCC)  -     pregabalin (LYRICA) 75 mg capsule; Take 1 capsule (75 mg total) by mouth 3 (three) times a day    Other orders  -     insulin glargine (LANTUS SOLOSTAR) 100 units/mL injection pen; Inject 92 Units under the skin  -     cyanocobalamin (VITAMIN B-12) 1,000 mcg tablet; Take 500 mcg by mouth  -     traMADol (ULTRAM) 50 mg tablet; Take 60 mg by mouth every 6 (six) hours as needed  -     Ascorbic Acid (VITAMIN C) 1000 MG tablet; Take 1,000 mg by mouth daily  -     pyridoxine (VITAMIN B6) 100 mg tablet; Take 100 mg by mouth daily  -     Probiotic Product (PROBIOTIC-10 ULTIMATE PO); Take by mouth         Subjective: This is a 68 rh male with a history of diabetes and psoriatic arthritis presents in a follow up visit for numbness and pain in hands and feet  The symptoms began 4 to 5 years ago in the hands at the same time he was diagnosed with diabetes  It involves the feet to the ankle bilaterally and all of the fingers on the right ( except for the thumb) and the second digit on the left hand   three years ago he underwent right carpal tunnel release and cubital tunnel release which reduced the numbness in the palm  No wrist , elbow or ankle pain   he was placed on Lyrica 75 mg tid but he developed side effects ( weight gain, increase in blood sugars and incontinence) and he reduced the dose to 75 mg daily   gabapentin was tried but it caused side effects   no falls or problems with balance   He underwent emg of the le and ue   They confirmed the presence of a polyneuropathy   Lyrica was increased to 75 mg at bid    Overall the sympotms are better   He is using otc Lidoderm cream every 3 to 4 days with benefit   However, he c/o of a itchiness in the arms and ankles  At night    He has been using otc cream for pruritus every day   Lidoderm patches were ineffective  Kenalog was ineffective         he was noted to have elevation of bun/cr and multiple med's have been adjusted   He is now on insulin     He recently was treated with a b12 injection                    The following portions of the patient's history were reviewed and updated as appropriate:   He  has a past medical history of CAD (coronary artery disease); Chronic kidney disease; History of prostate cancer; HTN (hypertension); Hyperlipidemia; Leukocytosis; Psoriatic arthritis ([facility] [address] ); SOB (shortness of breath); Syncope; Type 2 diabetes mellitus without complication, without long-term current use of insulin ([facility] [address] ); and Weakness generalized  He  has a past surgical history that includes Carpal tunnel release (Bilateral, 2015); Elbow surgery (Right, 2015); Hernia repair; Shoulder arthroscopy; and Skin cancer excision (11/2017)  His family history includes Cancer in his mother; Heart disease in his father  He  reports that he has never smoked  He has never used smokeless tobacco  He reports that he does not drink alcohol or use drugs    Current Outpatient Prescriptions   Medication Sig Dispense Refill    amLODIPine-benazepril (LOTREL 5-10) 5-10 MG per capsule Take 1 capsule by mouth daily      Ascorbic Acid (VITAMIN C) 1000 MG tablet Take 1,000 mg by mouth daily      ASPIRIN 81 PO Take 81 mg by mouth daily      cyanocobalamin (VITAMIN B-12) 1,000 mcg tablet Take 500 mcg by mouth      folic acid (FOLVITE) 1 mg tablet Take 1 mg by mouth daily      hydrochlorothiazide (HYDRODIURIL) 25 mg tablet Take 25 mg by mouth daily      insulin glargine (LANTUS SOLOSTAR) 100 units/mL injection pen Inject 92 Units under the skin      pregabalin (LYRICA) 75 mg capsule Take 1 capsule (75 mg total) by mouth 3 (three) times a day 90 capsule 5    Probiotic Product (PROBIOTIC-10 ULTIMATE PO) Take by mouth      pyridoxine (VITAMIN B6) 100 mg tablet Take 100 mg by mouth daily      simvastatin (ZOCOR) 40 mg tablet Take 40 mg by mouth daily      traMADol (ULTRAM) 50 mg tablet Take 60 mg by mouth every 6 (six) hours as needed      furosemide (LASIX) 40 mg tablet Take 40 mg by mouth as needed       metFORMIN (GLUCOPHAGE) 1000 MG tablet Take 1,000 mg by mouth 2 (two) times a day      nabumetone (RELAFEN) 750 mg tablet Take 750 mg by mouth 2 (two) times a day       No current facility-administered medications for this visit  He is allergic to clindamycin; doxycycline; penicillins; fish oil; methotrexate; and sulfamethoxazole-trimethoprim            Objective:    Blood pressure 132/84, pulse 94, resp  rate 12, height 5' 8" (1 727 m), weight 109 kg (241 lb 1 6 oz)  Physical Exam   Constitutional: He appears well-developed  HENT:   Head: Normocephalic  Eyes: EOM are normal  Pupils are equal, round, and reactive to light  Neck: Normal range of motion  Cardiovascular: Normal rate  Neurological: He has normal strength  Psychiatric: His speech is normal        Neurological Exam    Mental Status  The patient is alert and oriented to person, place, time, and situation  His speech is normal  He has normal attention span and concentration  He has a normal fund of knowledge  Cranial Nerves    CN II: The patient's visual acuity and visual fields are normal   CN III, IV, VI: The patient's pupils are equally round and reactive to light and ocular movements are normal   CN V: The patient has normal facial sensation  CN VII:  The patient has symmetric facial movement  CN VIII:  The patient's hearing is normal   CN IX, X: The patient has symmetric palate movement and normal gag reflex    CN XI: The patient's shoulder shrug strength is normal   CN XII: The patient's tongue is midline without atrophy or fasciculations  Motor   His strength is 5/5 throughout all four extremities  But limited by pain     Sensory    10 sec in hands, vibration to knees , temp to  Calf ,       , absent vibration  in toes, ankles , jps intact in  feet,     Reflexes  2 in kj , aj 1 , toes downgoing     Gait and Coordination   He has a wide stance  He has normal right finger to nose and normal left finger to nose coordination  ROS:    Review of Systems   Constitutional: Negative  HENT: Negative  Eyes: Negative  Respiratory: Negative  Cardiovascular: Negative  Gastrointestinal: Negative  Endocrine: Negative  Genitourinary: Negative  Musculoskeletal: Negative  Skin: Negative  Allergic/Immunologic: Negative  Neurological: Negative  Hematological: Negative  Psychiatric/Behavioral: Positive for sleep disturbance          Night Itches No

## 2024-03-14 NOTE — H&P PST ADULT - PROBLEM SELECTOR PLAN 4
educated on instructions  chlorhexidine wash given to patient and instructions  labs in PST: CBC BMP T&S A1C MRSA CT knee   day of: FS ABO confirm    medical clearance pending  dental clearance pending

## 2024-03-15 LAB
ALBUMIN SERPL ELPH-MCNC: 4 G/DL
ALP BLD-CCNC: 114 U/L
ALT SERPL-CCNC: 17 U/L
ANION GAP SERPL CALC-SCNC: 16 MMOL/L
APPEARANCE: CLEAR
APTT BLD: 29.1 SEC
AST SERPL-CCNC: 23 U/L
BACTERIA UR CULT: NORMAL
BACTERIA: NEGATIVE /HPF
BASOPHILS # BLD AUTO: 0.07 K/UL
BASOPHILS NFR BLD AUTO: 0.9 %
BILIRUB SERPL-MCNC: 0.4 MG/DL
BILIRUBIN URINE: NEGATIVE
BLOOD URINE: NEGATIVE
BUN SERPL-MCNC: 46 MG/DL
CALCIUM SERPL-MCNC: 9.2 MG/DL
CAST: 0 /LPF
CHLORIDE SERPL-SCNC: 104 MMOL/L
CO2 SERPL-SCNC: 21 MMOL/L
COLOR: YELLOW
CREAT SERPL-MCNC: 1.96 MG/DL
EGFR: 35 ML/MIN/1.73M2
EOSINOPHIL # BLD AUTO: 0.17 K/UL
EOSINOPHIL NFR BLD AUTO: 2.2 %
EPITHELIAL CELLS: 1 /HPF
GLUCOSE QUALITATIVE U: >=1000 MG/DL
GLUCOSE SERPL-MCNC: 124 MG/DL
HCT VFR BLD CALC: 40.5 %
HGB BLD-MCNC: 13 G/DL
IMM GRANULOCYTES NFR BLD AUTO: 0.3 %
INR PPP: 0.97 RATIO
KETONES URINE: NEGATIVE MG/DL
LEUKOCYTE ESTERASE URINE: NEGATIVE
LYMPHOCYTES # BLD AUTO: 1.79 K/UL
LYMPHOCYTES NFR BLD AUTO: 22.7 %
MAN DIFF?: NORMAL
MCHC RBC-ENTMCNC: 29.1 PG
MCHC RBC-ENTMCNC: 32.1 GM/DL
MCV RBC AUTO: 90.8 FL
MICROSCOPIC-UA: NORMAL
MONOCYTES # BLD AUTO: 0.64 K/UL
MONOCYTES NFR BLD AUTO: 8.1 %
MRSA PCR RESULT.: SIGNIFICANT CHANGE UP
NEUTROPHILS # BLD AUTO: 5.21 K/UL
NEUTROPHILS NFR BLD AUTO: 65.8 %
NITRITE URINE: NEGATIVE
PH URINE: 6
PLATELET # BLD AUTO: 192 K/UL
POTASSIUM SERPL-SCNC: 4.5 MMOL/L
PROT SERPL-MCNC: 6.7 G/DL
PROTEIN URINE: NORMAL MG/DL
PT BLD: 11 SEC
RBC # BLD: 4.46 M/UL
RBC # FLD: 14.3 %
RED BLOOD CELLS URINE: 0 /HPF
S AUREUS DNA NOSE QL NAA+PROBE: SIGNIFICANT CHANGE UP
SODIUM SERPL-SCNC: 141 MMOL/L
SPECIFIC GRAVITY URINE: 1.02
UROBILINOGEN URINE: 0.2 MG/DL
WBC # FLD AUTO: 7.9 K/UL
WHITE BLOOD CELLS URINE: 1 /HPF

## 2024-03-21 NOTE — PHYSICAL EXAM
[Normal] : affect was normal and insight and judgment were intact [No Acute Distress] : no acute distress [Well Nourished] : well nourished [Well Developed] : well developed [Well-Appearing] : well-appearing [Normal Sclera/Conjunctiva] : normal sclera/conjunctiva [PERRL] : pupils equal round and reactive to light [EOMI] : extraocular movements intact [Normal Outer Ear/Nose] : the outer ears and nose were normal in appearance [Normal Oropharynx] : the oropharynx was normal [No JVD] : no jugular venous distention [Supple] : supple [No Lymphadenopathy] : no lymphadenopathy [No Respiratory Distress] : no respiratory distress  [Thyroid Normal, No Nodules] : the thyroid was normal and there were no nodules present [Clear to Auscultation] : lungs were clear to auscultation bilaterally [No Accessory Muscle Use] : no accessory muscle use [Normal Rate] : normal rate  [Regular Rhythm] : with a regular rhythm [Normal S1, S2] : normal S1 and S2 [No Murmur] : no murmur heard [No Carotid Bruits] : no carotid bruits [No Abdominal Bruit] : a ~M bruit was not heard ~T in the abdomen [No Varicosities] : no varicosities [Pedal Pulses Present] : the pedal pulses are present [No Edema] : there was no peripheral edema [No Palpable Aorta] : no palpable aorta [No Extremity Clubbing/Cyanosis] : no extremity clubbing/cyanosis [Soft] : abdomen soft [Non Tender] : non-tender [Non-distended] : non-distended [No Masses] : no abdominal mass palpated [No HSM] : no HSM [Normal Bowel Sounds] : normal bowel sounds [Normal Posterior Cervical Nodes] : no posterior cervical lymphadenopathy [No CVA Tenderness] : no CVA  tenderness [Normal Anterior Cervical Nodes] : no anterior cervical lymphadenopathy [No Spinal Tenderness] : no spinal tenderness [Grossly Normal Strength/Tone] : grossly normal strength/tone [No Joint Swelling] : no joint swelling [No Rash] : no rash [Coordination Grossly Intact] : coordination grossly intact [Normal Gait] : normal gait [No Focal Deficits] : no focal deficits [Deep Tendon Reflexes (DTR)] : deep tendon reflexes were 2+ and symmetric [Normal Affect] : the affect was normal [Normal Insight/Judgement] : insight and judgment were intact [de-identified] : b/l krystal

## 2024-03-21 NOTE — HISTORY OF PRESENT ILLNESS
[FreeTextEntry1] : coughing [de-identified] : patient presents c/o coughing prod brown sputum x 2 weeks. also c/o runny nose. denies sore throat, fever or nasal congestion. patient also here for Pre-op clearance regarding his knee.

## 2024-03-21 NOTE — ASSESSMENT
[FreeTextEntry1] : CXR r/o PNA  Addendum 3/21/24: after review of recent blood work and cardiovascular testing the patient has an intermediate Risk for vernon-operative and post-operative CV complications. Cleared for Knee surgery. please see attached cardiac consult and work up.

## 2024-03-27 RX ORDER — VANCOMYCIN HCL 1 G
1500 VIAL (EA) INTRAVENOUS ONCE
Refills: 0 | Status: COMPLETED | OUTPATIENT
Start: 2024-04-04 | End: 2024-04-04

## 2024-04-02 DIAGNOSIS — Z23 ENCOUNTER FOR IMMUNIZATION: ICD-10-CM

## 2024-04-02 RX ORDER — AMOXICILLIN AND CLAVULANATE POTASSIUM 875; 125 MG/1; MG/1
875-125 TABLET, COATED ORAL TWICE DAILY
Qty: 14 | Refills: 0 | Status: COMPLETED | COMMUNITY
Start: 2024-03-04 | End: 2024-04-02

## 2024-04-02 RX ORDER — FLUTICASONE PROPIONATE 50 UG/1
50 SPRAY, METERED NASAL DAILY
Qty: 48 | Refills: 0 | Status: COMPLETED | COMMUNITY
Start: 2023-03-15 | End: 2024-04-02

## 2024-04-02 RX ORDER — DOXYCYCLINE HYCLATE 100 MG/1
100 TABLET ORAL TWICE DAILY
Qty: 14 | Refills: 0 | Status: COMPLETED | COMMUNITY
Start: 2024-03-04 | End: 2024-04-02

## 2024-04-03 ENCOUNTER — TRANSCRIPTION ENCOUNTER (OUTPATIENT)
Age: 74
End: 2024-04-03

## 2024-04-04 ENCOUNTER — INPATIENT (INPATIENT)
Facility: HOSPITAL | Age: 74
LOS: 0 days | Discharge: HOME | DRG: 470 | End: 2024-04-05
Attending: ORTHOPAEDIC SURGERY | Admitting: ORTHOPAEDIC SURGERY
Payer: MEDICARE

## 2024-04-04 ENCOUNTER — APPOINTMENT (OUTPATIENT)
Dept: ORTHOPEDIC SURGERY | Facility: HOSPITAL | Age: 74
End: 2024-04-04

## 2024-04-04 VITALS
RESPIRATION RATE: 18 BRPM | HEIGHT: 64.02 IN | TEMPERATURE: 98 F | OXYGEN SATURATION: 98 % | HEART RATE: 71 BPM | WEIGHT: 201.06 LBS | SYSTOLIC BLOOD PRESSURE: 152 MMHG | DIASTOLIC BLOOD PRESSURE: 68 MMHG

## 2024-04-04 DIAGNOSIS — Z98.89 OTHER SPECIFIED POSTPROCEDURAL STATES: Chronic | ICD-10-CM

## 2024-04-04 DIAGNOSIS — Z96.652 PRESENCE OF LEFT ARTIFICIAL KNEE JOINT: Chronic | ICD-10-CM

## 2024-04-04 DIAGNOSIS — Z90.49 ACQUIRED ABSENCE OF OTHER SPECIFIED PARTS OF DIGESTIVE TRACT: Chronic | ICD-10-CM

## 2024-04-04 DIAGNOSIS — Z95.5 PRESENCE OF CORONARY ANGIOPLASTY IMPLANT AND GRAFT: Chronic | ICD-10-CM

## 2024-04-04 DIAGNOSIS — Z98.890 OTHER SPECIFIED POSTPROCEDURAL STATES: Chronic | ICD-10-CM

## 2024-04-04 DIAGNOSIS — M17.11 UNILATERAL PRIMARY OSTEOARTHRITIS, RIGHT KNEE: ICD-10-CM

## 2024-04-04 DIAGNOSIS — K42.9 UMBILICAL HERNIA WITHOUT OBSTRUCTION OR GANGRENE: Chronic | ICD-10-CM

## 2024-04-04 PROCEDURE — 97607 NEG PRS WND THR NDME<=50SQCM: CPT

## 2024-04-04 PROCEDURE — 73560 X-RAY EXAM OF KNEE 1 OR 2: CPT | Mod: 26,RT

## 2024-04-04 PROCEDURE — 27447 TOTAL KNEE ARTHROPLASTY: CPT | Mod: RT

## 2024-04-04 PROCEDURE — 20985 CPTR-ASST DIR MS PX: CPT

## 2024-04-04 DEVICE — IMP PATELLA SYMMETRIC 31X9MM: Type: IMPLANTABLE DEVICE | Site: RIGHT | Status: FUNCTIONAL

## 2024-04-04 DEVICE — TIBIAL COMPONENT: Type: IMPLANTABLE DEVICE | Site: RIGHT | Status: FUNCTIONAL

## 2024-04-04 DEVICE — INSERT TIB BEARING CS X3 SZ 5 9MM: Type: IMPLANTABLE DEVICE | Site: RIGHT | Status: FUNCTIONAL

## 2024-04-04 DEVICE — COMP FEM CRUCIATE RETAINING: Type: IMPLANTABLE DEVICE | Site: RIGHT | Status: FUNCTIONAL

## 2024-04-04 DEVICE — MAKO BONE PIN 4MM X 140MM: Type: IMPLANTABLE DEVICE | Site: RIGHT | Status: FUNCTIONAL

## 2024-04-04 DEVICE — MAKO BONE PIN 4MM X 110MM: Type: IMPLANTABLE DEVICE | Site: RIGHT | Status: FUNCTIONAL

## 2024-04-04 RX ORDER — SENNA PLUS 8.6 MG/1
2 TABLET ORAL AT BEDTIME
Refills: 0 | Status: DISCONTINUED | OUTPATIENT
Start: 2024-04-04 | End: 2024-04-05

## 2024-04-04 RX ORDER — AMLODIPINE BESYLATE 2.5 MG/1
10 TABLET ORAL DAILY
Refills: 0 | Status: DISCONTINUED | OUTPATIENT
Start: 2024-04-04 | End: 2024-04-05

## 2024-04-04 RX ORDER — DULAGLUTIDE 4.5 MG/.5ML
1.5 INJECTION, SOLUTION SUBCUTANEOUS
Refills: 0 | DISCHARGE

## 2024-04-04 RX ORDER — CARVEDILOL PHOSPHATE 80 MG/1
25 CAPSULE, EXTENDED RELEASE ORAL EVERY 12 HOURS
Refills: 0 | Status: DISCONTINUED | OUTPATIENT
Start: 2024-04-04 | End: 2024-04-05

## 2024-04-04 RX ORDER — DEXTROSE 50 % IN WATER 50 %
12.5 SYRINGE (ML) INTRAVENOUS ONCE
Refills: 0 | Status: DISCONTINUED | OUTPATIENT
Start: 2024-04-04 | End: 2024-04-05

## 2024-04-04 RX ORDER — CEFAZOLIN SODIUM 1 G
2000 VIAL (EA) INJECTION EVERY 8 HOURS
Refills: 0 | Status: COMPLETED | OUTPATIENT
Start: 2024-04-04 | End: 2024-04-05

## 2024-04-04 RX ORDER — GLUCAGON INJECTION, SOLUTION 0.5 MG/.1ML
1 INJECTION, SOLUTION SUBCUTANEOUS ONCE
Refills: 0 | Status: DISCONTINUED | OUTPATIENT
Start: 2024-04-04 | End: 2024-04-05

## 2024-04-04 RX ORDER — OXYCODONE HYDROCHLORIDE 5 MG/1
5 TABLET ORAL EVERY 4 HOURS
Refills: 0 | Status: DISCONTINUED | OUTPATIENT
Start: 2024-04-04 | End: 2024-04-04

## 2024-04-04 RX ORDER — DEXTROSE 50 % IN WATER 50 %
25 SYRINGE (ML) INTRAVENOUS ONCE
Refills: 0 | Status: DISCONTINUED | OUTPATIENT
Start: 2024-04-04 | End: 2024-04-05

## 2024-04-04 RX ORDER — ACETAMINOPHEN 500 MG
1000 TABLET ORAL EVERY 8 HOURS
Refills: 0 | Status: DISCONTINUED | OUTPATIENT
Start: 2024-04-06 | End: 2024-04-05

## 2024-04-04 RX ORDER — ACETAMINOPHEN 500 MG
1000 TABLET ORAL ONCE
Refills: 0 | Status: COMPLETED | OUTPATIENT
Start: 2024-04-05 | End: 2024-04-05

## 2024-04-04 RX ORDER — TRAMADOL HYDROCHLORIDE 50 MG/1
50 TABLET ORAL EVERY 6 HOURS
Refills: 0 | Status: DISCONTINUED | OUTPATIENT
Start: 2024-04-04 | End: 2024-04-05

## 2024-04-04 RX ORDER — INSULIN LISPRO 100/ML
VIAL (ML) SUBCUTANEOUS
Refills: 0 | Status: DISCONTINUED | OUTPATIENT
Start: 2024-04-04 | End: 2024-04-05

## 2024-04-04 RX ORDER — LOSARTAN POTASSIUM 100 MG/1
100 TABLET, FILM COATED ORAL DAILY
Refills: 0 | Status: DISCONTINUED | OUTPATIENT
Start: 2024-04-05 | End: 2024-04-05

## 2024-04-04 RX ORDER — MAGNESIUM HYDROXIDE 400 MG/1
30 TABLET, CHEWABLE ORAL DAILY
Refills: 0 | Status: DISCONTINUED | OUTPATIENT
Start: 2024-04-04 | End: 2024-04-05

## 2024-04-04 RX ORDER — PANTOPRAZOLE SODIUM 20 MG/1
40 TABLET, DELAYED RELEASE ORAL
Refills: 0 | Status: DISCONTINUED | OUTPATIENT
Start: 2024-04-04 | End: 2024-04-05

## 2024-04-04 RX ORDER — OXYCODONE HYDROCHLORIDE 5 MG/1
10 TABLET ORAL EVERY 4 HOURS
Refills: 0 | Status: DISCONTINUED | OUTPATIENT
Start: 2024-04-04 | End: 2024-04-04

## 2024-04-04 RX ORDER — CARVEDILOL PHOSPHATE 80 MG/1
1 CAPSULE, EXTENDED RELEASE ORAL
Refills: 0 | DISCHARGE

## 2024-04-04 RX ORDER — METFORMIN HYDROCHLORIDE 850 MG/1
500 TABLET ORAL
Refills: 0 | Status: DISCONTINUED | OUTPATIENT
Start: 2024-04-04 | End: 2024-04-05

## 2024-04-04 RX ORDER — ATORVASTATIN CALCIUM 80 MG/1
80 TABLET, FILM COATED ORAL AT BEDTIME
Refills: 0 | Status: DISCONTINUED | OUTPATIENT
Start: 2024-04-04 | End: 2024-04-05

## 2024-04-04 RX ORDER — HYDROMORPHONE HYDROCHLORIDE 2 MG/ML
0.5 INJECTION INTRAMUSCULAR; INTRAVENOUS; SUBCUTANEOUS
Refills: 0 | Status: DISCONTINUED | OUTPATIENT
Start: 2024-04-04 | End: 2024-04-04

## 2024-04-04 RX ORDER — DEXTROSE 50 % IN WATER 50 %
15 SYRINGE (ML) INTRAVENOUS ONCE
Refills: 0 | Status: DISCONTINUED | OUTPATIENT
Start: 2024-04-04 | End: 2024-04-05

## 2024-04-04 RX ORDER — ALPRAZOLAM 0.25 MG
1 TABLET ORAL DAILY
Refills: 0 | Status: DISCONTINUED | OUTPATIENT
Start: 2024-04-04 | End: 2024-04-05

## 2024-04-04 RX ORDER — VENLAFAXINE HCL 75 MG
150 CAPSULE, EXT RELEASE 24 HR ORAL DAILY
Refills: 0 | Status: DISCONTINUED | OUTPATIENT
Start: 2024-04-04 | End: 2024-04-05

## 2024-04-04 RX ORDER — ONDANSETRON 8 MG/1
4 TABLET, FILM COATED ORAL EVERY 6 HOURS
Refills: 0 | Status: DISCONTINUED | OUTPATIENT
Start: 2024-04-04 | End: 2024-04-05

## 2024-04-04 RX ORDER — METHIMAZOLE 10 MG/1
1 TABLET ORAL
Refills: 0 | DISCHARGE

## 2024-04-04 RX ORDER — OMEPRAZOLE 10 MG/1
1 CAPSULE, DELAYED RELEASE ORAL
Qty: 0 | Refills: 0 | DISCHARGE

## 2024-04-04 RX ORDER — OXYCODONE HYDROCHLORIDE 5 MG/1
5 TABLET ORAL EVERY 4 HOURS
Refills: 0 | Status: DISCONTINUED | OUTPATIENT
Start: 2024-04-04 | End: 2024-04-05

## 2024-04-04 RX ORDER — OLMESARTAN MEDOXOMIL-HYDROCHLOROTHIAZIDE 25; 40 MG/1; MG/1
1 TABLET, FILM COATED ORAL
Qty: 0 | Refills: 0 | DISCHARGE

## 2024-04-04 RX ORDER — OXYCODONE HYDROCHLORIDE 5 MG/1
2.5 TABLET ORAL EVERY 4 HOURS
Refills: 0 | Status: DISCONTINUED | OUTPATIENT
Start: 2024-04-04 | End: 2024-04-05

## 2024-04-04 RX ORDER — SODIUM CHLORIDE 9 MG/ML
1000 INJECTION, SOLUTION INTRAVENOUS
Refills: 0 | Status: DISCONTINUED | OUTPATIENT
Start: 2024-04-04 | End: 2024-04-05

## 2024-04-04 RX ORDER — SODIUM CHLORIDE 9 MG/ML
500 INJECTION INTRAMUSCULAR; INTRAVENOUS; SUBCUTANEOUS ONCE
Refills: 0 | Status: COMPLETED | OUTPATIENT
Start: 2024-04-04 | End: 2024-04-05

## 2024-04-04 RX ORDER — ASPIRIN/CALCIUM CARB/MAGNESIUM 324 MG
81 TABLET ORAL DAILY
Refills: 0 | Status: DISCONTINUED | OUTPATIENT
Start: 2024-04-05 | End: 2024-04-05

## 2024-04-04 RX ORDER — ONDANSETRON 8 MG/1
4 TABLET, FILM COATED ORAL ONCE
Refills: 0 | Status: DISCONTINUED | OUTPATIENT
Start: 2024-04-04 | End: 2024-04-04

## 2024-04-04 RX ORDER — SODIUM CHLORIDE 9 MG/ML
500 INJECTION INTRAMUSCULAR; INTRAVENOUS; SUBCUTANEOUS ONCE
Refills: 0 | Status: COMPLETED | OUTPATIENT
Start: 2024-04-04 | End: 2024-04-04

## 2024-04-04 RX ORDER — APIXABAN 2.5 MG/1
2.5 TABLET, FILM COATED ORAL
Refills: 0 | Status: DISCONTINUED | OUTPATIENT
Start: 2024-04-05 | End: 2024-04-05

## 2024-04-04 RX ORDER — TAMSULOSIN HYDROCHLORIDE 0.4 MG/1
0.4 CAPSULE ORAL AT BEDTIME
Refills: 0 | Status: DISCONTINUED | OUTPATIENT
Start: 2024-04-04 | End: 2024-04-05

## 2024-04-04 RX ORDER — DEXTROSE 10 % IN WATER 10 %
125 INTRAVENOUS SOLUTION INTRAVENOUS ONCE
Refills: 0 | Status: DISCONTINUED | OUTPATIENT
Start: 2024-04-04 | End: 2024-04-05

## 2024-04-04 RX ORDER — POLYETHYLENE GLYCOL 3350 17 G/17G
17 POWDER, FOR SOLUTION ORAL AT BEDTIME
Refills: 0 | Status: DISCONTINUED | OUTPATIENT
Start: 2024-04-04 | End: 2024-04-05

## 2024-04-04 RX ORDER — EMPAGLIFLOZIN 10 MG/1
1 TABLET, FILM COATED ORAL
Qty: 0 | Refills: 0 | DISCHARGE

## 2024-04-04 RX ORDER — METFORMIN HYDROCHLORIDE 850 MG/1
1 TABLET ORAL
Refills: 0 | DISCHARGE

## 2024-04-04 RX ADMIN — PANTOPRAZOLE SODIUM 40 MILLIGRAM(S): 20 TABLET, DELAYED RELEASE ORAL at 14:37

## 2024-04-04 RX ADMIN — SODIUM CHLORIDE 500 MILLILITER(S): 9 INJECTION INTRAMUSCULAR; INTRAVENOUS; SUBCUTANEOUS at 22:48

## 2024-04-04 RX ADMIN — SENNA PLUS 2 TABLET(S): 8.6 TABLET ORAL at 21:26

## 2024-04-04 RX ADMIN — TRAMADOL HYDROCHLORIDE 50 MILLIGRAM(S): 50 TABLET ORAL at 14:38

## 2024-04-04 RX ADMIN — ATORVASTATIN CALCIUM 80 MILLIGRAM(S): 80 TABLET, FILM COATED ORAL at 21:27

## 2024-04-04 RX ADMIN — SODIUM CHLORIDE 500 MILLILITER(S): 9 INJECTION INTRAMUSCULAR; INTRAVENOUS; SUBCUTANEOUS at 19:16

## 2024-04-04 RX ADMIN — TAMSULOSIN HYDROCHLORIDE 0.4 MILLIGRAM(S): 0.4 CAPSULE ORAL at 21:27

## 2024-04-04 RX ADMIN — Medication 100 MILLIGRAM(S): at 23:48

## 2024-04-04 RX ADMIN — Medication 300 MILLIGRAM(S): at 14:37

## 2024-04-04 NOTE — PATIENT PROFILE ADULT - NSPRESCRUSEDDRG_GEN_A_NUR
At this time writer spoke with patient and conveyed PA team/insurance coverage response in regards to Zepbound prescription. Patient verbalized understanding and has no further questions or concerns. Medication listed updated.    No

## 2024-04-04 NOTE — CHART NOTE - NSCHARTNOTEFT_GEN_A_CORE
Patient seen and evaluated in the recovery unit. Resting without complaints. Pt states pain is well tolerated. No Chest Pain, SOB, N/V/D/HA.    T(C): 36.4 (04-04-24 @ 18:40), Max: 36.8 (04-04-24 @ 14:15)  HR: 62 (04-04-24 @ 20:00) (54 - 71)  BP: 131/68 (04-04-24 @ 20:00) (129/65 - 152/68)  RR: 16 (04-04-24 @ 20:00) (14 - 18)  SpO2: 96% (04-04-24 @ 20:00) (92% - 100%)  Wt(kg): --    Exam:  Alert and Oriented, No Acute Distress.   Card: +S1/S2, RRR  Pulm: CTAB  Laterality: R Lower extremity   Prevena dressing, holding suction, on R Knee clean, dry and intact.   Sensory: Sensation intact to light touch above the R knee, decreased below the knee 2/2 spinal block still being in effect   Motor: unable to assess PF/DF/EHL/FHL at this time 2/2 spinal block still being in effect   Calves soft, non-tender bilaterally   2+ DP/PT pulse  Lower extremities warm and well-perfused, cap refill < 3 sec.     Xray:   IMPRESSION: s/p R Total Knee Arthroplasty  < from: Xray Knee 1 or 2 Views, Right (04.04.24 @ 18:31) >    Unconstrained right total knee prosthesis implanted.  Intact and aligned hardware and no periprosthetic fractures.  Postoperative soft tissue changes.  Surgical skin staples overlie operative site.  Correlate with intraoperative findings.    Labs:     A/P: 73yMale S/p R Total Knee Arthroplasty & Intra-articular injection. VSS. NAD.  -PT/OT- WBAT on RLE/OOB   -IS bedside   -DVT PPx: Eliquis 2.5 mg BID, Aspirin 81 mg QD, SCD, Early OOB and Amb  -GI PPx: Protonix 40 mg QD  -no NSAIDs   -Prevena & Staples   -Pain Control  -Continue Current Tx  -f/u AM labs.   -appreciate Hospitalist recommendations  -Dispo planning: PACU to Floor, pending PT/OT eval.     Brett Arauz PA-C  Orthopedic Surgery Team  Team Pager #[phone] Patient seen and evaluated in his room on [address] [place]. Resting without complaints. Pt states pain is well tolerated. No Chest Pain, SOB, N/V/D/HA.    T(C): 36.4 (04-04-24 @ 18:40), Max: 36.8 (04-04-24 @ 14:15)  HR: 62 (04-04-24 @ 20:00) (54 - 71)  BP: 131/68 (04-04-24 @ 20:00) (129/65 - 152/68)  RR: 16 (04-04-24 @ 20:00) (14 - 18)  SpO2: 96% (04-04-24 @ 20:00) (92% - 100%)  Wt(kg): --    Exam:  Alert and Oriented, No Acute Distress.   Card: +S1/S2, RRR  Pulm: CTAB  Laterality: R Lower extremity   Prevena dressing, holding suction, on R Knee clean, dry and intact.   Sensory: Sensation intact to light touch   Motor: 5/5 (+) PF/DF/EHL/FHL   Calves soft, non-tender bilaterally   2+ DP/PT pulse  Lower extremities warm and well-perfused, cap refill < 3 sec.     Xray:   IMPRESSION: s/p R Total Knee Arthroplasty  < from: Xray Knee 1 or 2 Views, Right (04.04.24 @ 18:31) >    Unconstrained right total knee prosthesis implanted.  Intact and aligned hardware and no periprosthetic fractures.  Postoperative soft tissue changes.  Surgical skin staples overlie operative site.  Correlate with intraoperative findings.    Labs:     A/P: 73yMale S/p R Total Knee Arthroplasty & Intra-articular injection. VSS. NAD.  -PT/OT- WBAT on RLE/OOB   -IS bedside   -DVT PPx: Eliquis 2.5 mg BID, Aspirin 81 mg QD, SCD, Early OOB and Amb  -GI PPx: Protonix 40 mg QD  -no NSAIDs   -Prevena & Staples   -Pain Control  -Continue Current Tx  -f/u AM labs.   -appreciate Hospitalist recommendations  -Dispo planning: TBD, pending PT/OT eval.     Brett Arauz PA-C  Orthopedic Surgery Team  Team Pager #[phone]

## 2024-04-04 NOTE — PRE-ANESTHESIA EVALUATION ADULT - NSANTHPMHFT_GEN_ALL_CORE
pmhx of DM, CAD x2 stents 2009, osteoarthritis, depression, anxiety, BPH, GERD, lung CA ( has follow up Ct scan 3/15/24 for 6 month follow up), kidney mass, HTN, HLD, throat Ca (last follow up 2/2024- stable)  patient reports for a few years was having bilateral knee pain. patient had left knee surgery done 12 years ago and no complaints since. patient reports over the last 5-6 months has been having increase to right knee pain coming in for R TKA

## 2024-04-05 ENCOUNTER — TRANSCRIPTION ENCOUNTER (OUTPATIENT)
Age: 74
End: 2024-04-05

## 2024-04-05 VITALS — HEART RATE: 73 BPM | OXYGEN SATURATION: 96 % | SYSTOLIC BLOOD PRESSURE: 121 MMHG | DIASTOLIC BLOOD PRESSURE: 66 MMHG

## 2024-04-05 DIAGNOSIS — N18.30 CHRONIC KIDNEY DISEASE, STAGE 3 UNSPECIFIED: ICD-10-CM

## 2024-04-05 DIAGNOSIS — K21.9 GASTRO-ESOPHAGEAL REFLUX DISEASE WITHOUT ESOPHAGITIS: ICD-10-CM

## 2024-04-05 DIAGNOSIS — Z29.9 ENCOUNTER FOR PROPHYLACTIC MEASURES, UNSPECIFIED: ICD-10-CM

## 2024-04-05 DIAGNOSIS — Z96.651 PRESENCE OF RIGHT ARTIFICIAL KNEE JOINT: ICD-10-CM

## 2024-04-05 DIAGNOSIS — N40.0 BENIGN PROSTATIC HYPERPLASIA WITHOUT LOWER URINARY TRACT SYMPTOMS: ICD-10-CM

## 2024-04-05 DIAGNOSIS — I10 ESSENTIAL (PRIMARY) HYPERTENSION: ICD-10-CM

## 2024-04-05 LAB
ANION GAP SERPL CALC-SCNC: 11 MMOL/L — SIGNIFICANT CHANGE UP (ref 5–17)
BUN SERPL-MCNC: 44 MG/DL — HIGH (ref 7–23)
CALCIUM SERPL-MCNC: 8.8 MG/DL — SIGNIFICANT CHANGE UP (ref 8.4–10.5)
CHLORIDE SERPL-SCNC: 106 MMOL/L — SIGNIFICANT CHANGE UP (ref 96–108)
CO2 SERPL-SCNC: 24 MMOL/L — SIGNIFICANT CHANGE UP (ref 22–31)
CREAT SERPL-MCNC: 1.88 MG/DL — HIGH (ref 0.5–1.3)
EGFR: 37 ML/MIN/1.73M2 — LOW
GLUCOSE SERPL-MCNC: 167 MG/DL — HIGH (ref 70–99)
HCT VFR BLD CALC: 37.6 % — LOW (ref 39–50)
HGB BLD-MCNC: 12 G/DL — LOW (ref 13–17)
MCHC RBC-ENTMCNC: 29.4 PG — SIGNIFICANT CHANGE UP (ref 27–34)
MCHC RBC-ENTMCNC: 31.9 GM/DL — LOW (ref 32–36)
MCV RBC AUTO: 92.2 FL — SIGNIFICANT CHANGE UP (ref 80–100)
NRBC # BLD: 0 /100 WBCS — SIGNIFICANT CHANGE UP (ref 0–0)
PLATELET # BLD AUTO: 175 K/UL — SIGNIFICANT CHANGE UP (ref 150–400)
POTASSIUM SERPL-MCNC: 5.2 MMOL/L — SIGNIFICANT CHANGE UP (ref 3.5–5.3)
POTASSIUM SERPL-SCNC: 5.2 MMOL/L — SIGNIFICANT CHANGE UP (ref 3.5–5.3)
RBC # BLD: 4.08 M/UL — LOW (ref 4.2–5.8)
RBC # FLD: 14.2 % — SIGNIFICANT CHANGE UP (ref 10.3–14.5)
SODIUM SERPL-SCNC: 141 MMOL/L — SIGNIFICANT CHANGE UP (ref 135–145)
WBC # BLD: 12.61 K/UL — HIGH (ref 3.8–10.5)
WBC # FLD AUTO: 12.61 K/UL — HIGH (ref 3.8–10.5)

## 2024-04-05 PROCEDURE — 99223 1ST HOSP IP/OBS HIGH 75: CPT

## 2024-04-05 RX ORDER — POLYETHYLENE GLYCOL 3350 17 G/17G
17 POWDER, FOR SOLUTION ORAL
Qty: 0 | Refills: 0 | DISCHARGE
Start: 2024-04-05

## 2024-04-05 RX ORDER — ACETAMINOPHEN 500 MG
2 TABLET ORAL
Qty: 84 | Refills: 0
Start: 2024-04-05 | End: 2024-04-18

## 2024-04-05 RX ORDER — DIPHENHYDRAMINE HCL 50 MG
25 CAPSULE ORAL EVERY 8 HOURS
Refills: 0 | Status: DISCONTINUED | OUTPATIENT
Start: 2024-04-05 | End: 2024-04-05

## 2024-04-05 RX ORDER — OXYCODONE HYDROCHLORIDE 5 MG/1
1 TABLET ORAL
Qty: 42 | Refills: 0
Start: 2024-04-05 | End: 2024-04-11

## 2024-04-05 RX ORDER — TRAMADOL HYDROCHLORIDE 50 MG/1
1 TABLET ORAL
Qty: 28 | Refills: 0
Start: 2024-04-05 | End: 2024-04-11

## 2024-04-05 RX ORDER — ACETAMINOPHEN 500 MG
2 TABLET ORAL
Qty: 42 | Refills: 0
Start: 2024-04-05 | End: 2024-04-11

## 2024-04-05 RX ORDER — SENNA PLUS 8.6 MG/1
2 TABLET ORAL
Qty: 14 | Refills: 0
Start: 2024-04-05 | End: 2024-04-11

## 2024-04-05 RX ORDER — NALOXONE HYDROCHLORIDE 4 MG/.1ML
1 SPRAY NASAL
Qty: 1 | Refills: 0
Start: 2024-04-05 | End: 2024-04-11

## 2024-04-05 RX ORDER — ASPIRIN/CALCIUM CARB/MAGNESIUM 324 MG
1 TABLET ORAL
Qty: 0 | Refills: 0 | DISCHARGE

## 2024-04-05 RX ORDER — APIXABAN 2.5 MG/1
1 TABLET, FILM COATED ORAL
Qty: 28 | Refills: 0
Start: 2024-04-05 | End: 2024-04-18

## 2024-04-05 RX ORDER — HYDROCORTISONE 1 %
1 OINTMENT (GRAM) TOPICAL
Refills: 0 | Status: DISCONTINUED | OUTPATIENT
Start: 2024-04-05 | End: 2024-04-05

## 2024-04-05 RX ADMIN — Medication 400 MILLIGRAM(S): at 15:59

## 2024-04-05 RX ADMIN — Medication 81 MILLIGRAM(S): at 13:19

## 2024-04-05 RX ADMIN — Medication 1000 MILLIGRAM(S): at 01:52

## 2024-04-05 RX ADMIN — Medication 400 MILLIGRAM(S): at 09:35

## 2024-04-05 RX ADMIN — CARVEDILOL PHOSPHATE 25 MILLIGRAM(S): 80 CAPSULE, EXTENDED RELEASE ORAL at 17:52

## 2024-04-05 RX ADMIN — METFORMIN HYDROCHLORIDE 500 MILLIGRAM(S): 850 TABLET ORAL at 17:53

## 2024-04-05 RX ADMIN — Medication 1 TABLET(S): at 13:19

## 2024-04-05 RX ADMIN — Medication 1 MILLIGRAM(S): at 02:34

## 2024-04-05 RX ADMIN — OXYCODONE HYDROCHLORIDE 5 MILLIGRAM(S): 5 TABLET ORAL at 13:24

## 2024-04-05 RX ADMIN — CARVEDILOL PHOSPHATE 25 MILLIGRAM(S): 80 CAPSULE, EXTENDED RELEASE ORAL at 06:20

## 2024-04-05 RX ADMIN — APIXABAN 2.5 MILLIGRAM(S): 2.5 TABLET, FILM COATED ORAL at 17:52

## 2024-04-05 RX ADMIN — METFORMIN HYDROCHLORIDE 500 MILLIGRAM(S): 850 TABLET ORAL at 06:20

## 2024-04-05 RX ADMIN — OXYCODONE HYDROCHLORIDE 5 MILLIGRAM(S): 5 TABLET ORAL at 04:26

## 2024-04-05 RX ADMIN — Medication 100 MILLIGRAM(S): at 08:42

## 2024-04-05 RX ADMIN — Medication 1000 MILLIGRAM(S): at 17:50

## 2024-04-05 RX ADMIN — AMLODIPINE BESYLATE 10 MILLIGRAM(S): 2.5 TABLET ORAL at 06:20

## 2024-04-05 RX ADMIN — OXYCODONE HYDROCHLORIDE 5 MILLIGRAM(S): 5 TABLET ORAL at 14:05

## 2024-04-05 RX ADMIN — Medication 150 MILLIGRAM(S): at 13:18

## 2024-04-05 RX ADMIN — APIXABAN 2.5 MILLIGRAM(S): 2.5 TABLET, FILM COATED ORAL at 06:20

## 2024-04-05 RX ADMIN — OXYCODONE HYDROCHLORIDE 5 MILLIGRAM(S): 5 TABLET ORAL at 05:26

## 2024-04-05 RX ADMIN — Medication 400 MILLIGRAM(S): at 00:52

## 2024-04-05 RX ADMIN — Medication 25 MILLIGRAM(S): at 13:31

## 2024-04-05 RX ADMIN — PANTOPRAZOLE SODIUM 40 MILLIGRAM(S): 20 TABLET, DELAYED RELEASE ORAL at 06:20

## 2024-04-05 RX ADMIN — LOSARTAN POTASSIUM 100 MILLIGRAM(S): 100 TABLET, FILM COATED ORAL at 06:20

## 2024-04-05 RX ADMIN — SODIUM CHLORIDE 500 MILLILITER(S): 9 INJECTION INTRAMUSCULAR; INTRAVENOUS; SUBCUTANEOUS at 06:20

## 2024-04-05 RX ADMIN — Medication 1000 MILLIGRAM(S): at 10:06

## 2024-04-05 NOTE — CONSULT NOTE ADULT - SUBJECTIVE AND OBJECTIVE BOX
HPI:  Patient comes to PST for right total knee arthroplasty with maria luisa robot IVAC application with MD Dubois on 4/4/24. Patient has a pmhx of DM, CAD x2 stents 2009, osteoarthritis, depression, anxiety, BPH, GERD, lung CA ( has follow up Ct scan 3/15/24 for 6 month follow up), kidney mass, HTN, HLD, throat Ca (last follow up 2/2024- stable)  patient reports for a few years was having bilateral knee pain. patient had left knee surgery done 12 years ago and no complaints since. patient reports over the last 5-6 months has been having increase to right knee pain. Patient did PRP 01/22/2024 with no relief. Patient has not done physical therapy for right knee. Reports taking tylenol with some relief. Reports walking around makes the pain worse. Reports when laying down pain subsides. Patient reports for work standing on feet for long period of time and has increase in pain. Patient denies any numbness or tingling to right leg. Patient denies any other complaints.    (14 Mar 2024 14:56)      PAST MEDICAL & SURGICAL HISTORY:  Lower back pain  radiating to right knee      HTN (hypertension)      DM (diabetes mellitus)  type 2      History of throat cancer  Surgery with radiation in 2012, in remission.      CAD (coronary artery disease)      HLD (hyperlipidemia)      Current use of long term anticoagulation      GERD (gastroesophageal reflux disease)      BPH (benign prostatic hypertrophy)      Gout      Gastritis      Left kidney mass      Lung cancer  Stage 0, 2023, surgically emoved no Chemo or radiation needed.      Anemia      Anxiety and depression      Osteoarthritis      H/O total knee replacement, left      S/P arthroscopy of knee  2016      H/O heart artery stent  x2      History of laryngoscopy      History of lung surgery  2023      History of back surgery      H/O colonoscopy      Umbilical hernia  repair in 2023          Review of Systems:   CONSTITUTIONAL: No fever  EYES: No eye pain  ENMT:  No difficulty hearing  NECK: No pain or stiffness  RESPIRATORY: No cough, wheezing, chills or hemoptysis; No shortness of breath  CARDIOVASCULAR: No chest pain, palpitations, dizziness, or leg swelling  GASTROINTESTINAL: No abdominal or epigastric pain. No nausea, vomiting, or hematemesis; No diarrhea or constipation. No melena or hematochezia.  GENITOURINARY: No dysuria  NEUROLOGICAL: No headaches, memory loss, loss of strength, numbness, or tremors  ENDOCRINE: No heat or cold intolerance; No hair loss  MUSCULOSKELETAL: +right knee pain   PSYCHIATRIC: No depression    Allergies    No Known Allergies    Intolerances        Social History:     FAMILY HISTORY:  No pertinent family history in first degree relatives        MEDICATIONS  (STANDING):  acetaminophen   IVPB .. 1000 milliGRAM(s) IV Intermittent once  amLODIPine   Tablet 10 milliGRAM(s) Oral daily  apixaban 2.5 milliGRAM(s) Oral two times a day  aspirin enteric coated 81 milliGRAM(s) Oral daily  atorvastatin 80 milliGRAM(s) Oral at bedtime  carvedilol 25 milliGRAM(s) Oral every 12 hours  dextrose 10% Bolus 125 milliLiter(s) IV Bolus once  dextrose 5%. 1000 milliLiter(s) (100 mL/Hr) IV Continuous <Continuous>  dextrose 5%. 1000 milliLiter(s) (50 mL/Hr) IV Continuous <Continuous>  dextrose 50% Injectable 25 Gram(s) IV Push once  dextrose 50% Injectable 12.5 Gram(s) IV Push once  glucagon  Injectable 1 milliGRAM(s) IntraMuscular once  hydrochlorothiazide 12.5 milliGRAM(s) Oral daily  insulin lispro (ADMELOG) corrective regimen sliding scale   SubCutaneous three times a day before meals  insulin lispro (ADMELOG) corrective regimen sliding scale   SubCutaneous three times a day before meals  losartan 100 milliGRAM(s) Oral daily  metFORMIN 500 milliGRAM(s) Oral two times a day  methimazole 5 milliGRAM(s) Oral daily  multivitamin 1 Tablet(s) Oral daily  pantoprazole    Tablet 40 milliGRAM(s) Oral before breakfast  polyethylene glycol 3350 17 Gram(s) Oral at bedtime  senna 2 Tablet(s) Oral at bedtime  tamsulosin 0.4 milliGRAM(s) Oral at bedtime  venlafaxine XR. 150 milliGRAM(s) Oral daily    MEDICATIONS  (PRN):  ALPRAZolam 1 milliGRAM(s) Oral daily PRN anxiety  dextrose Oral Gel 15 Gram(s) Oral once PRN Blood Glucose LESS THAN 70 milliGRAM(s)/deciliter  magnesium hydroxide Suspension 30 milliLiter(s) Oral daily PRN Constipation  ondansetron Injectable 4 milliGRAM(s) IV Push every 6 hours PRN Nausea and/or Vomiting  oxyCODONE    IR 2.5 milliGRAM(s) Oral every 4 hours PRN Moderate Pain (4 - 6)  oxyCODONE    IR 5 milliGRAM(s) Oral every 4 hours PRN Severe Pain (7 - 10)  traMADol 50 milliGRAM(s) Oral every 6 hours PRN Mild Pain (1 - 3)        CAPILLARY BLOOD GLUCOSE      POCT Blood Glucose.: 157 mg/dL (05 Apr 2024 09:38)  POCT Blood Glucose.: 140 mg/dL (04 Apr 2024 21:29)  POCT Blood Glucose.: 116 mg/dL (04 Apr 2024 18:52)  POCT Blood Glucose.: 129 mg/dL (04 Apr 2024 13:37)    I&O's Summary    04 Apr 2024 07:01  -  05 Apr 2024 07:00  --------------------------------------------------------  IN: 1040 mL / OUT: 600 mL / NET: 440 mL        PHYSICAL EXAM:  GENERAL: NAD, well-developed  HEAD:  Atraumatic, Normocephalic  EYES: conjunctiva and sclera clear  NECK: No JVD  CHEST/LUNG: Clear to auscultation bilaterally; No wheeze  HEART: Regular rate and rhythm; S1S2  ABDOMEN: Soft, Nontender, Nondistended; Bowel sounds present  EXTREMITIES:  +right knee dressing   PSYCH: AAOx3      LABS:                        12.0   12.61 )-----------( 175      ( 05 Apr 2024 06:36 )             37.6     04-05    141  |  106  |  44<H>  ----------------------------<  167<H>  5.2   |  24  |  1.88<H>    Ca    8.8      05 Apr 2024 06:36            Urinalysis Basic - ( 05 Apr 2024 06:36 )    Color: x / Appearance: x / SG: x / pH: x  Gluc: 167 mg/dL / Ketone: x  / Bili: x / Urobili: x   Blood: x / Protein: x / Nitrite: x   Leuk Esterase: x / RBC: x / WBC x   Sq Epi: x / Non Sq Epi: x / Bacteria: x        RADIOLOGY & ADDITIONAL TESTS:    Imaging Personally Reviewed:    Consultant(s) Notes Reviewed:      Care Discussed with Consultants/Other Providers: ortho

## 2024-04-05 NOTE — DISCHARGE NOTE PROVIDER - NSDCFUADDAPPT_GEN_ALL_CORE_FT
Please follow up with Dr. Dubois on your scheduled appointment on 4/19/24  Please call to confirm appointment time.    Please follow up with your primary care provider within 4 weeks of hospital discharge to discuss your recent surgery and medication adjustment as needed.

## 2024-04-05 NOTE — CONSULT NOTE ADULT - PROBLEM SELECTOR RECOMMENDATION 9
Pt S/p R Total Knee Arthroplasty   WBAT on RLE/OOB   Pt-Home PT   Multimodal pain control per surgery  DVT PPx per surgery: Eliquis 2.5 mg BID, Aspirin 81 mg QD

## 2024-04-05 NOTE — CONSULT NOTE ADULT - PROBLEM SELECTOR RECOMMENDATION 2
C/w asprin 81mg qd  C/w norvasc 10mg qd  C/w coreg 25mg bid   C/w losartan 100mg equivalent for olmesartan 40mg   C/w lipitor 80mg

## 2024-04-05 NOTE — DISCHARGE NOTE PROVIDER - NSDCFUSCHEDAPPT_GEN_ALL_CORE_FT
[facility]  ORTHOSURG [address] [facility]  Scheduled Appointment: 04/19/2024    Eladio Dhaliwal  [facility]  OTOLARYNG [address]  Scheduled Appointment: 04/29/2024    Evan Izquierdo  [facility]  INTMED Do [address]  Scheduled Appointment: 05/06/2024

## 2024-04-05 NOTE — PROGRESS NOTE ADULT - ASSESSMENT
79 y/o M s/p right total knee arthroplasty (MANNY) POD#1, PT/OT, d/c planning  Maritza Santana PA-C  Orthopaedic Surgery  Team pager [phone]  [facility] [phone]  [phone]

## 2024-04-05 NOTE — CONSULT NOTE ADULT - PROBLEM SELECTOR PROBLEM 5
BPH (benign prostatic hypertrophy) Patient returned to room. Post angiogram recovery recovery initiated. Patient awake and alert. Left groin with band aid intact. No hematoma or drainage noted. Pedal pulses palpable. Family at bedside, call light with in reach. Side rails up times 2.

## 2024-04-05 NOTE — PHYSICAL THERAPY INITIAL EVALUATION ADULT - ADDITIONAL COMMENTS
Referring Physician: Dave Donaldson NP                             Date: 6/1/20    Reason for Referral: Screening colonoscopy      Family History of:   Colon polyp: No  Relationship/Age of Onset:       Colon cancer: No  Relationship/Age of Onset:       Patient with:   Hemoccults Done:       Iron deficient: No        On Blood Thinner: No      Valvular heart disease/valve replacement: NO      Anemia Present: No      On NSAID: No      Lung disease: No      Kidney disease:No       Hx of polyps:       Hx of colon cancer:       Previous colon evalations: First colonoscopy  When:   Where:   Pertinent symptoms:           Review of patient's allergies indicates: NKDA        Patient was scheduled for colonoscopy on 7/14/20       with Dr. Sales at [facility].       instructions were reviewed with patient.        Prep sent to [facility] in [place]        GOLYTELY/ COLYTE/ NULYTELY Instructions    You are scheduled for a colonoscopy with Dr. Sales on 7/14/20 at [facility]  To ensure that your test is accurate and complete, you MUST follow these instructions listed below.  If you have any questions, please call our office at [phone].  Plan on being at the hospital for your procedure for 3-4 hours.    1.  Follow a CLEAR LIQUID DIET for the entire day before your scheduled colonoscopy.  This means no solid food the entire day starting when you wake.  You may have as much of the clear liquids as you want throughout the day.   CLEAR LIQUID DIET:   - Avoid Red, Orange, Purple, and/or Blue food coloring   - NO DAIRY   - You can have:  Coffee with sugar (no creamer), tea, water, soda, apple or white grape juice, chicken or beef broth/bouillon (no meat, noodles, or veggies), green/yellow popsicles, green/yellow Jell-O, lemonade.    2.  MIX GOLYTELY/COLYTE/NULYTELY (all names for same product) WITH ONE (1) GALLON OF WATER.  YOU MAY ADD A FLAVOR PACKET OR YELLOW/GREEN POWDER DRINK MIX TO THIS.  PUT IN  REFRIGERATOR.  This is easier to drink if this solution is cold, so you can mix the solution one day ahead of time and place in the refrigerator prior to drinking.  You have to drink the solution within 24-36 hours of mixing it.  Do NOT put this solution over ice.  It IS ok to drink with a straw.    3. AT 5 PM THE DAY BEFORE YOUR COLONOSCOPY, DRINK ONE (1) 8 OUNCE GLASS OF MIXTURE EVERY 10 MINUTES UNTIL HALF OF THE GALLON IS CONSUMED.  Keep this mixture cold and in refrigerator as much as you can while drinking it.  Place the remaining half of mixture in the refrigerator when you finish the first half.    4.  The endoscopy department will call you 2 days before your colonoscopy to tell you the exact time to arrive, AND to tell you the exact time to drink the 2nd portion of your prep (which will be FIVE HOURS BEFORE YOUR ARRIVAL TIME).  At this time given to you, DRINK ONE (1) 8 OUNCE GLASS OF MIXTURE EVERY 10 MINUTES UNTIL THE OTHER HALF IS CONSUMED. Keep the mixture cold while you are drinking it. Once this is complete, you may not have ANYTHING else by mouth!      5.  You must have someone with you to DRIVE YOU HOME since you will be receiving IV sedation for the colonoscopy.    6.  It is ok to take your heart, blood pressure, and seizure medications in the morning of your test with a SIP of water.  Hold other medications until after your procedure.  Do NOT have anything else to eat or drink the morning of your colonoscopy.  It is ok to brush your teeth.    7.  If you are on blood thinners THAT YOU HAVE BEEN INSTRUCTED TO HOLD BY YOUR DOCTOR FOR THIS PROCEDURE, then do NOT take this the morning of your colonoscopy.  Do NOT stop these medications on your own, they must be approved to be held by your doctor.  Your colonoscopy can NOT be done if you are on these medications.  Examples of blood thinners include: Coumadin, Aggrenox, Plavix, Pradaxa, Reapro, Pletal, Xarelto, Ticagrelor, Brilinta, Eliquis, and high dose  aspirin (325 mg).  You do not have to stop baby aspirin 81 mg.    8.  IF YOU ARE DIABETIC:  NO INSULIN OR ORAL MEDICATIONS THE MORNING OF THE COLONOSCOPY.  TAKE ONLY HALF THE DOSE OF YOUR INSULIN THE DAY BEFORE THE COLONOSCOPY.  DO NOT TAKE ANY ORAL DIABETIC MEDICATIONS THE DAY BEFORE THE COLONOSCOPY.  IF YOU ARE AN INSULIN DEPENDENT DIABETIC WITH UNSTABLE BLOOD SUGARS, NOTIFY YOUR PRIMARY CARE PHYSICIAN FOR INSTRUCTIONS.    Pt lives in a private house with spouse with one step to enter and no steps inside to negotiate. Pt was Ind with all ADLs and amb without AD. Pt owns RW

## 2024-04-05 NOTE — OCCUPATIONAL THERAPY INITIAL EVALUATION ADULT - PERTINENT HX OF CURRENT PROBLEM, REHAB EVAL
Patient comes to PST for right total knee arthroplasty with maria luisa robot IVAC application with MD Dubois on 4/4/24. Patient has a pmhx of DM, CAD x2 stents 2009, osteoarthritis, depression, anxiety, BPH, GERD, lung CA ( has follow up Ct scan 3/15/24 for 6 month follow up), kidney mass, HTN, HLD, throat Ca (last follow up 2/2024- stable) patient reports for a few years was having bilateral knee pain. patient had left knee surgery done 12 years ago and no complaints since. patient reports over the last 5-6 months has been having increase to right knee pain. Patient did PRP 01/22/2024 with no relief. Patient has not done physical therapy for right knee. Reports taking tylenol with some relief. Reports walking around makes the pain worse. Reports when laying down pain subsides. Patient reports for work standing on feet for long period of time and has increase in pain. Patient denies any numbness or tingling to right leg. Patient denies any other complaints.  S/p R TKA  Xray knee 4/4- Unconstrained right total knee prosthesis implanted. Intact and aligned hardware and no periprosthetic fractures. Postoperative soft tissue changes. Surgical skin staples overlie operative site. Correlate with intraoperative findings.

## 2024-04-05 NOTE — DISCHARGE NOTE PROVIDER - NSDCFUADDINST_GEN_ALL_CORE_FT
Continue weight bearing as tolerated ambulation,  keep surgical incision Pravena dressing clean and dry, have Pravena dressing removed and Aquacel Bandage applied POD#6(4/10/2024) Continue weight bearing as tolerated ambulation,  keep Prevena incisional vac clean and dry, have Prevena dressing removed and Aquacel Bandage applied on 4/10/2024 by home physical therapy team.    Weight bearing as tolerated to right leg

## 2024-04-05 NOTE — DISCHARGE NOTE NURSING/CASE MANAGEMENT/SOCIAL WORK - PATIENT PORTAL LINK FT
You can access the FollowMyHealth Patient Portal offered by [facility] by registering at the following website: [email or web address]. By joining [employer]’s FollowMyHealth portal, you will also be able to view your health information using other applications (apps) compatible with our system.

## 2024-04-05 NOTE — PROGRESS NOTE ADULT - SUBJECTIVE AND OBJECTIVE BOX
Patient is a 73y old  Male who presents with a chief complaint of " pain to right knee"  Goal: be pain free and walk around (14 Mar 2024 14:56)  Patient s/p Right total knee arthroplasty(MANNY), POD#1  Patient comfortable  No complaints    T(C): 36.9 (04-05-24 @ 04:35), Max: 36.9 (04-04-24 @ 23:50)  HR: 72 (04-05-24 @ 04:35) (54 - 91)  BP: 143/65 (04-05-24 @ 04:35) (127/63 - 168/70)  RR: 18 (04-05-24 @ 04:35) (14 - 18)  SpO2: 95% (04-05-24 @ 04:35) (92% - 100%)    PHYSICAL EXAM:  NAD, Alert  [Right  ] Knee: Parvena Dressing C/D/I; sensation grossly intact to light touch; (+) DF/PF; (+) Distal Pulses; No Calf tenderness B/L, PAS     LABS:                      12.0   12.61 )-----------( 175      ( 05 Apr 2024 06:36 )             37.6   04-05  141  |  106  |  44<H>  ----------------------------<  167<H>  5.2   |  24  |  1.88<H>  Ca    8.8      05 Apr 2024 06:36

## 2024-04-05 NOTE — PHYSICAL THERAPY INITIAL EVALUATION ADULT - PERTINENT HX OF CURRENT PROBLEM, REHAB EVAL
Pt is a 74 y/o male admitted to [facility] on 4/4/24  pmhx of DM, CAD x2 stents 2009, osteoarthritis, depression, anxiety, BPH, GERD, lung CA ( has follow up Ct scan 3/15/24 for 6 month follow up), kidney mass, HTN, HLD, throat Ca (last follow up 2/2024- stable). Patient reports for a few years was having bilateral knee pain. patient had left knee surgery done 12 years ago and no complaints since. Patient reports over the last 5-6 months has been having increase to right knee pain. Patient did PRP 01/22/2024 with no relief. Patient has not done physical therapy for right knee. Reports walking around makes the pain worse. Reports when laying down pain subsides. Patient reports for work standing on feet for long period of time and has increase in pain.

## 2024-04-05 NOTE — CONSULT NOTE ADULT - NSPROBSELRECBLANK_5_GEN
[facility] 
 
 
 [address] [place] [address] 
[phone] Patient: Frank Lau MRN: [id number] About your hospitalization You were admitted on:  September 7, 2018 You last received care in the:  [facility] OP PACU You were discharged on:  September 7, 2018 Why you were hospitalized Your primary diagnosis was:  Not on File Follow-up Information Follow up With Details Comments Contact Info Mary aMin MD   [address]  [address] 
[phone] Discharge Orders None A check katerin indicates which time of day the medication should be taken. My Medications START taking these medications Instructions Each Dose to Equal  
 Morning Noon Evening Bedtime HYDROcodone-acetaminophen 5-325 mg per tablet Commonly known as:  Allyson Macario Your last dose was: Your next dose is: Take 1 Tab by mouth every four (4) hours as needed for Pain. Max Daily Amount: 6 Tabs. 1 Tab CONTINUE taking these medications Instructions Each Dose to Equal  
 Morning Noon Evening Bedtime  
 aspirin 81 mg chewable tablet Your last dose was: Your next dose is: Take 81 mg by mouth daily as needed for Pain. Takes for headaches when needed. 81 mg Where to Get Your Medications Information on where to get these meds will be given to you by the nurse or doctor. ! Ask your nurse or doctor about these medications HYDROcodone-acetaminophen 5-325 mg per tablet Opioid Education Prescription Opioids: What You Need to Know: 
 
Prescription opioids can be used to help relieve moderate-to-severe pain and are often prescribed following a surgery or injury, or for certain health conditions.   These medications can be an important part of treatment but also come with serious risks. Opioids are strong pain medicines. Examples include hydrocodone, oxycodone, fentanyl, and morphine. Heroin is an example of an illegal opioid. It is important to work with your health care provider to make sure you are getting the safest, most effective care. WHAT ARE THE RISKS AND SIDE EFFECTS OF OPIOID USE? Prescription opioids carry serious risks of addiction and overdose, especially with prolonged use. An opioid overdose, often marked by slow breathing, can cause sudden death. The use of prescription opioids can have a number of side effects as well, even when taken as directed. · Tolerance-meaning you might need to take more of a medication for the same pain relief · Physical dependence-meaning you have symptoms of withdrawal when the medication is stopped. Withdrawal symptoms can include nausea, sweating, chills, diarrhea, stomach cramps, and muscle aches. Withdrawal can last up to several weeks, depending on which drug you took and how long you took it. · Increased sensitivity to pain · Constipation · Nausea, vomiting, and dry mouth · Sleepiness and dizziness · Confusion · Depression · Low levels of testosterone that can result in lower sex drive, energy, and strength · Itching and sweating RISKS ARE GREATER WITH:      
· History of drug misuse, substance use disorder, or overdose · Mental health conditions (such as depression or anxiety) · Sleep apnea · Older age (72 years or older) · Pregnancy Avoid alcohol while taking prescription opioids. Also, unless specifically advised by your health care provider, medications to avoid include: · Benzodiazepines (such as Xanax or Valium) · Muscle relaxants (such as Soma or Flexeril) · Hypnotics (such as Ambien or Lunesta) · Other prescription opioids KNOW YOUR OPTIONS Talk to your health care provider about ways to manage your pain that don't involve prescription opioids. Some of these options may actually work better and have fewer risks and side effects. Options may include: 
· Pain relievers such as acetaminophen, ibuprofen, and naproxen · Some medications that are also used for depression or seizures · Physical therapy and exercise · Counseling to help patients learn how to cope better with triggers of pain and stress. · Application of heat or cold compress · Massage therapy · Relaxation techniques Be Informed Make sure you know the name of your medication, how much and how often to take it, and its potential risks & side effects. IF YOU ARE PRESCRIBED OPIOIDS FOR PAIN: 
· Never take opioids in greater amounts or more often than prescribed. Remember the goal is not to be pain-free but to manage your pain at a tolerable level. · Follow up with your primary care provider to: · Work together to create a plan on how to manage your pain. · Talk about ways to help manage your pain that don't involve prescription opioids. · Talk about any and all concerns and side effects. · Help prevent misuse and abuse. · Never sell or share prescription opioids · Help prevent misuse and abuse. · Store prescription opioids in a secure place and out of reach of others (this may include visitors, children, friends, and family). · Safely dispose of unused/unwanted prescription opioids: Find your community drug take-back program or your pharmacy mail-back program, or flush them down the toilet, following guidance from the Food and Drug Administration (www.fda.gov/Drugs/ResourcesForYou). · Visit www.cdc.gov/drugoverdose to learn about the risks of opioid abuse and overdose. · If you believe you may be struggling with addiction, tell your health care provider and ask for guidance or call [address] [place], [place] [address] at 4-504-491-QCID. Discharge Instructions Post-Operative Instructions For  [facility] K[facility] Arthroscopy Phone:  [phone] 1. Unless otherwise instructed, you may place as much weight on your leg as you wish. 2. For the first 48-72 hours, following surgery, use ice on the knee every two hours (while awake) for 20-30 minutes at a time to help prevent swelling and lessen pain. Elevate the leg. 3. Perform at least 30 to 50 leg-raising exercises twice a day. These are in the arthroscopy booklet that you received. Begin exercise as soon as pain allows. Also perform gentle active motion of the knee as soon as pain allows. 4. On the second  day after surgery, remove the dressing. Leave steri-strips on, they eventually will peel off. Use waterproof band aids to cover when you shower. 5. Use  pain medication as instructed on the bottle. If you already have pain medication from another physician do not use it with this medication. You can also take ibuprofen for pain with this medication if you are not already on an arthritis type medication. 6. You may have some side effects from your pain medication. If you have nausea, try taking your medication with food. For itching, you may take over the counter Benadryl. 7. You may shower after two days. Remove the dressing and use waterproof band aids from the drugstore. 8. If you have a problem, please call [address] [place] [address] at [phone] Uvaldo Denny M.D. [employer]  
 
·  
 
DIET · Clear liquids until no nausea or vomiting; then light diet for the first day. · Advance to regular diet on second day, unless your doctor orders otherwise. · If nausea and vomiting continues, call your doctor. · Avoid greasy and spicy food today to reduce nausea. PAIN 
· Take pain medication as directed by your doctor. · Call your doctor if pain is NOT relieved by medication. · DO NOT take aspirin of blood thinners unless directed by your doctor. · Take pain pills with food to reduce nausea · Pain pills may cause constipation. May use stool softener CALL YOUR DOCTOR IF  
· Excessive bleeding that does not stop after holding pressure over the area · Temperature of 101 degrees F or above · Excessive redness, swelling or bruising, and/ or green or yellow, smelly discharge from incision AFTER ANESTHESIA · For the first 24 hours: DO NOT Drive, Drink alcoholic beverages, or Make important decisions. · Be aware of dizziness following anesthesia and while taking pain medication. APPOINTMENT DATE/ TIME: keep scheduled appointment YOUR DOCTOR'S PHONE NUMBER [phone] DISCHARGE SUMMARY from Nurse PATIENT INSTRUCTIONS: 
 
After general anesthesia or intravenous sedation, for 24 hours or while taking prescription Narcotics: · Limit your activities · Do not drive and operate hazardous machinery · Do not make important personal or business decisions · Do  not drink alcoholic beverages · If you have not urinated within 8 hours after discharge, please contact your surgeon on call. *  Please give a list of your current medications to your Primary Care Provider. *  Please update this list whenever your medications are discontinued, doses are 
    changed, or new medications (including over-the-counter products) are added. *  Please carry medication information at all times in case of emergency situations. These are general instructions for a healthy lifestyle: No smoking/ No tobacco products/ Avoid exposure to second hand smoke Surgeon General's Warning:  Quitting smoking now greatly reduces serious risk to your health. Obesity, smoking, and sedentary lifestyle greatly increases your risk for illness A healthy diet, regular physical exercise & weight monitoring are important for maintaining a healthy lifestyle You may be retaining fluid if you have a history of heart failure or if you experience any of the following symptoms:  Weight gain of 3 pounds or more overnight or 5 pounds in a week, increased swelling in our hands or feet or shortness of breath while lying flat in bed. Please call your doctor as soon as you notice any of these symptoms; do not wait until your next office visit. Recognize signs and symptoms of STROKE: 
 
F-face looks uneven A-arms unable to move or move unevenly S-speech slurred or non-existent T-time-call 911 as soon as signs and symptoms begin-DO NOT go Back to bed or wait to see if you get better-TIME IS BRAIN. Introducing [facility]! [employer] introduces MyMoneyPlatform patient portal. Now you can access parts of your medical record, email your doctor's office, and request medication refills online. 1. In your internet browser, go to [email or web address] 2. Click on the First Time User? Click Here link in the Sign In box. You will see the New Member Sign Up page. 3. Enter your MyMoneyPlatform Access Code exactly as it appears below. You will not need to use this code after youve completed the sign-up process. If you do not sign up before the expiration date, you must request a new code. · MyMoneyPlatform Access Code: [email or web address] Expires: 12/3/2018  4:19 PM 
 
4. Enter the last four digits of your Social Security Number (xxxx) and Date of Birth (mm/dd/yyyy) as indicated and click Submit. You will be taken to the next sign-up page. 5. Create a MyMoneyPlatform ID. This will be your MyMoneyPlatform login ID and cannot be changed, so think of one that is secure and easy to remember. 6. Create a MyMoneyPlatform password. You can change your password at any time. 7. Enter your Password Reset Question and Answer. This can be used at a later time if you forget your password. 8. Enter your e-mail address.  You will receive e-mail notification when new information is available in Oh[employer]. 9. Click Sign Up. You can now view and download portions of your medical record. 10. Click the Download Summary menu link to download a portable copy of your medical information. If you have questions, please visit the Frequently Asked Questions section of the [employer] website. Remember, Ohmx is NOT to be used for urgent needs. For medical emergencies, dial 911. Now available from your iPhone and Android! Introducing Oskar Ferguson As a [facility] patient, I wanted to make you aware of our electronic visit tool called Oskar Ferguson. [facility] 24/7 allows you to connect within minutes with a medical provider 24 hours a day, seven days a week via a mobile device or tablet or logging into a secure website from your computer. You can access Oskar Ferguson from anywhere in the [place]. A virtual visit might be right for you when you have a simple condition and feel like you just dont want to get out of bed, or cant get away from work for an appointment, when your regular [facility] provider is not available (evenings, weekends or holidays), or when youre out of town and need minor care. Electronic visits cost only $49 and if the [facility] 24/7 provider determines a prescription is needed to treat your condition, one can be electronically transmitted to a nearby pharmacy*. Please take a moment to enroll today if you have not already done so. The enrollment process is free and takes just a few minutes. To enroll, please download the [facility] 24/7 praful to your tablet or phone, or visit [email or web address] to enroll on your computer. And, as an [address] [place] [address] patient with a [employer] account, the results of your visits will be scanned into your electronic medical record and your primary care provider will be able to view the scanned results. We urge you to continue to see your regular [facility] provider for your ongoing medical care. And while your primary care provider may not be the one available when you seek a Oskar Ferguson virtual visit, the peace of mind you get from getting a real diagnosis real time can be priceless. For more information on Oskar Nilsdustyfin, view our Frequently Asked Questions (FAQs) at [email or web address]. Sincerely, 
 
Matt Gorman MD 
Chief Medical Officer [address] *:  certain medications cannot be prescribed via Oskar Ferguson Providers Seen During Your Hospitalization Provider Specialty Primary office phone Duke Cole MD Orthopedic Surgery [phone] Your Primary Care Physician (PCP) Primary Care Physician Office Phone Office Fax Doylene Heimlich [phone] You are allergic to the following No active allergies Recent Documentation Weight BMI Smoking Status 98 kg 26.29 kg/m2 Never Smoker Emergency Contacts Name Discharge Info Relation Home Work Mobile Brunner,Lorie  Spouse [3] [phone] Patient Belongings The following personal items are in your possession at time of discharge: 
  Dental Appliances: None Please provide this summary of care documentation to your next provider. Signatures-by signing, you are acknowledging that this After Visit Summary has been reviewed with you and you have received a copy. Patient Signature:  ____________________________________________________________ Date:  ____________________________________________________________  
  
Chris Latif Provider Signature:  ____________________________________________________________ Date:  ____________________________________________________________ DISPLAY PLAN FREE TEXT

## 2024-04-05 NOTE — DISCHARGE NOTE PROVIDER - NSDCHHPTASSISTHOME_GEN_ALL_CORE
Patient Needs Assistance to Leave Residence... Cephalexin Counseling: I counseled the patient regarding use of cephalexin as an antibiotic for prophylactic and/or therapeutic purposes. Cephalexin (commonly prescribed under brand name Keflex) is a cephalosporin antibiotic which is active against numerous classes of bacteria, including most skin bacteria. Side effects may include nausea, diarrhea, gastrointestinal upset, rash, hives, yeast infections, and in rare cases, hepatitis, kidney disease, seizures, fever, confusion, neurologic symptoms, and others. Patients with severe allergies to penicillin medications are cautioned that there is about a 10% incidence of cross-reactivity with cephalosporins. When possible, patients with penicillin allergies should use alternatives to cephalosporins for antibiotic therapy.

## 2024-04-05 NOTE — DISCHARGE NOTE PROVIDER - NSDCCPTREATMENT_GEN_ALL_CORE_FT
PRINCIPAL PROCEDURE  Procedure: Right total knee arthroplasty  Findings and Treatment: with maria luisa assist

## 2024-04-05 NOTE — DISCHARGE NOTE PROVIDER - HOSPITAL COURSE
Reason for Admission	" pain to right knee"  Goal: be pain free and walk around     History of Present Illness:  History of Present Illness	  Patient comes to PST for right total knee arthroplasty with maria luisa robot IVAC application with MD Dubois on 4/4/24. Patient has a pmhx of DM, CAD x2 stents 2009, osteoarthritis, depression, anxiety, BPH, GERD, lung CA ( has follow up Ct scan 3/15/24 for 6 month follow up), kidney mass, HTN, HLD, throat Ca (last follow up 2/2024- stable)  patient reports for a few years was having bilateral knee pain. patient had left knee surgery done 12 years ago and no complaints since. patient reports over the last 5-6 months has been having increase to right knee pain. Patient did PRP 01/22/2024 with no relief. Patient has not done physical therapy for right knee. Reports taking tylenol with some relief. Reports walking around makes the pain worse. Reports when laying down pain subsides. Patient reports for work standing on feet for long period of time and has increase in pain. Patient denies any numbness or tingling to right leg. Patient denies any other complaints.        Past Medical, Past Surgical History:  PAST MEDICAL HISTORY:  Anemia   Anxiety and depression   BPH (benign prostatic hypertrophy)   CAD (coronary artery disease)   Current use of long term anticoagulation   Dementia   Depression   DM (diabetes mellitus) type 2  DVT, lower extremity   Gastritis   GERD (gastroesophageal reflux disease)   Gout   H/O CHF   History of throat cancer   HLD (hyperlipidemia)   HTN  Left kidney mass   Lower back pain radiating to right knee  Lung cancer   Neuropathy   Osteoarthritis.     PAST SURGICAL HISTORY:  H/O colonoscopy   H/O heart artery stent x2  H/O total knee replacement, left   History of back surgery   History of cholecystectomy   History of laryngoscopy   History of lung surgery 2022  S/P arthroscopy of knee 2016  Umbilical hernia.     HOSPITAL COURSE:  72 y/o M underwent right total knee arthroplasty(MARIA LUISA) on 4/4/2024 with Dr. Dubois.  Patient tolerated procedure well.  Patient was evaluated postoperatively by physical therapist and _.  Patient advised to keep surgical incision/dressing clean and dry, and have dressing and surgical staples removed and steri strips applied post op day #14.  Patient further advised to follow up with  _ in _.   Reason for Admission	" pain to right knee"  Goal: be pain free and walk around     History of Present Illness:	  Patient comes to PST for right total knee arthroplasty with maria luisa robot IVAC application with MD Dubois on 4/4/24. Patient has a pmhx of DM, CAD x2 stents 2009, osteoarthritis, depression, anxiety, BPH, GERD, lung CA ( has follow up Ct scan 3/15/24 for 6 month follow up), kidney mass, HTN, HLD, throat Ca (last follow up 2/2024- stable)  patient reports for a few years was having bilateral knee pain. patient had left knee surgery done 12 years ago and no complaints since. patient reports over the last 5-6 months has been having increase to right knee pain. Patient did PRP 01/22/2024 with no relief. Patient has not done physical therapy for right knee. Reports taking tylenol with some relief. Reports walking around makes the pain worse. Reports when laying down pain subsides. Patient reports for work standing on feet for long period of time and has increase in pain. Patient denies any numbness or tingling to right leg. Patient denies any other complaints.        Past Medical, Past Surgical History:  PAST MEDICAL HISTORY:  Anemia   Anxiety and depression   BPH (benign prostatic hypertrophy)   CAD (coronary artery disease)   Current use of long term anticoagulation   Dementia   Depression   DM (diabetes mellitus) type 2  DVT, lower extremity   Gastritis   GERD (gastroesophageal reflux disease)   Gout   H/O CHF   History of throat cancer   HLD (hyperlipidemia)   HTN  Left kidney mass   Lower back pain radiating to right knee  Lung cancer   Neuropathy   Osteoarthritis.     PAST SURGICAL HISTORY:  H/O colonoscopy   H/O heart artery stent x2  H/O total knee replacement, left   History of back surgery   History of cholecystectomy   History of laryngoscopy   History of lung surgery 2022  S/P arthroscopy of knee 2016  Umbilical hernia.     HOSPITAL COURSE:  74 y/o M underwent right total knee arthroplasty(MARIA LUISA) on 4/4/2024 with Dr. Dubois.  Patient tolerated procedure well.  Patient was evaluated postoperatively by physical therapist and cleared for home disposition with home PT recommendations on 4/5/24.  Remainder of hospital stay unremarkable.  Hospital team followed during admission and all recommendations appreciated.

## 2024-04-05 NOTE — DISCHARGE NOTE PROVIDER - NSDCMRMEDTOKEN_GEN_ALL_CORE_FT
amLODIPine 10 mg oral tablet: 1 tab(s) orally once a day  aspirin 81 mg oral capsule: 1 cap(s) orally  atorvastatin 80 mg oral tablet: 1 tab(s) orally once a day (at bedtime)  Breztri Aerosphere 160 mcg-9 mcg-4.8 mcg/inh inhalation aerosol: 2 puff(s) inhaled  carvedilol 25 mg oral tablet: 1 tab(s) orally 2 times a day  Effexor  mg oral capsule, extended release: 1 cap(s) orally once a day  Flomax 0.4 mg oral capsule: 1 cap(s) orally once a day  Jardiance 25 mg oral tablet: 1 tab(s) orally once a day (in the morning)  metFORMIN 500 mg oral tablet: 1 orally 2 times a day  methIMAzole 5 mg oral tablet: 1 orally once a day  olmesartan-hydrochlorothiazide 40 mg-12.5 mg oral tablet: 1 tab(s) orally once a day  omeprazole 40 mg oral delayed release capsule: 1 cap(s) orally once a day  Plavix 75 mg oral tablet: 1 orally once a day  Trulicity Pen 1.5 mg/0.5 mL subcutaneous solution: 1.5 subcutaneously every 7 days  Xanax 1 mg oral tablet: orally once a day as needed for -  Zetia 10 mg oral tablet: 1 tab(s) orally once a day   acetaminophen 500 mg oral tablet: 2 tab(s) orally every 8 hours x 1 week and then as needed for mild pain  amLODIPine 10 mg oral tablet: 1 tab(s) orally once a day  apixaban 2.5 mg oral tablet: 1 tab(s) orally 2 times a day Last day 4/18/24 and then resume home dose Plavix on 4/19/24  aspirin 81 mg oral capsule: 1 cap(s) orally once a day  atorvastatin 80 mg oral tablet: 1 tab(s) orally once a day (at bedtime)  Breztri Aerosphere 160 mcg-9 mcg-4.8 mcg/inh inhalation aerosol: 2 puff(s) inhaled  carvedilol 25 mg oral tablet: 1 tab(s) orally 2 times a day  Effexor  mg oral capsule, extended release: 1 cap(s) orally once a day  Flomax 0.4 mg oral capsule: 1 cap(s) orally once a day  Jardiance 25 mg oral tablet: 1 tab(s) orally once a day (in the morning)  metFORMIN 500 mg oral tablet: 1 orally 2 times a day  methIMAzole 5 mg oral tablet: 1 orally once a day  Narcan 4 mg/0.1 mL nasal spray: 1 spray(s) intranasally every 3 minutes as needed for Opioid overdose Alternate nostrils every 3 minutes as needed  olmesartan-hydrochlorothiazide 40 mg-12.5 mg oral tablet: 1 tab(s) orally once a day  omeprazole 40 mg oral delayed release capsule: 1 cap(s) orally once a day  oxyCODONE 5 mg oral tablet: 1 tab(s) orally every 4 hours as needed for Severe Pain (7 - 10) MDD: 006  Plavix 75 mg oral tablet: 1 tablet orally once a day Resume on 4/19 after Eliquis completed  polyethylene glycol 3350 oral powder for reconstitution: 17 gram(s) orally once a day (at bedtime)  senna leaf extract oral tablet: 2 tab(s) orally once a day (at bedtime) as needed for  constipation  traMADol 50 mg oral tablet: 1 tab(s) orally every 6 hours as needed for Moderate Pain (4 - 7) MDD: 004  Trulicity Pen 1.5 mg/0.5 mL subcutaneous solution: 1.5 subcutaneously every 7 days  Xanax 1 mg oral tablet: orally once a day as needed for -  Zetia 10 mg oral tablet: 1 tab(s) orally once a day

## 2024-04-05 NOTE — OCCUPATIONAL THERAPY INITIAL EVALUATION ADULT - RANGE OF MOTION EXAMINATION, LOWER EXTREMITY
Left LE Active ROM was WNL  (within normal limits)/Right LE Active ROM was WFL   (within functional limits)

## 2024-04-05 NOTE — PHYSICAL THERAPY INITIAL EVALUATION ADULT - ONSET DATE, REHAB EVAL
"I was told by ENT to come to ER because I am not going better after taking antibiotics for over 24hrs and I have to spit out my saliva and it is very hard to swallow".
04-Apr-2024

## 2024-04-05 NOTE — OCCUPATIONAL THERAPY INITIAL EVALUATION ADULT - LIVES WITH, PROFILE
Pt lives in pvt home with wife with 2 steps to enter and 1 level inside. Pt has walk-in shower with doors +grab bars. Prior to admission, pt was (I) in ADLs and functional transfers. Pt owns walker./spouse

## 2024-04-05 NOTE — CONSULT NOTE ADULT - ASSESSMENT
78M with hx of DM2, CAD x2 stents 2009, osteoarthritis, depression, anxiety, BPH, GERD, lung CA ( has follow up Ct scan 3/15/24 for 6 month follow up), kidney mass, HTN, HLD, throat Ca (last follow up 2/2024- stable) admitted s/p R TKA

## 2024-04-05 NOTE — CONSULT NOTE ADULT - PROBLEM SELECTOR RECOMMENDATION 3
C/w norvasc 10mg qd  C/w coreg 25mg bid   C/w losartan 100mg equivalent for olmesartan 40mg   C/w hctz 12.5mg   Monitor BP

## 2024-04-06 ENCOUNTER — EMERGENCY (EMERGENCY)
Facility: HOSPITAL | Age: 74
LOS: 1 days | Discharge: HOME | End: 2024-04-06
Payer: MEDICARE

## 2024-04-06 VITALS
OXYGEN SATURATION: 95 % | SYSTOLIC BLOOD PRESSURE: 124 MMHG | HEART RATE: 82 BPM | DIASTOLIC BLOOD PRESSURE: 67 MMHG | RESPIRATION RATE: 16 BRPM | TEMPERATURE: 98 F

## 2024-04-06 VITALS
TEMPERATURE: 99 F | HEART RATE: 80 BPM | SYSTOLIC BLOOD PRESSURE: 143 MMHG | WEIGHT: 199.96 LBS | HEIGHT: 64 IN | DIASTOLIC BLOOD PRESSURE: 70 MMHG | OXYGEN SATURATION: 96 % | RESPIRATION RATE: 22 BRPM

## 2024-04-06 DIAGNOSIS — Z95.5 PRESENCE OF CORONARY ANGIOPLASTY IMPLANT AND GRAFT: Chronic | ICD-10-CM

## 2024-04-06 DIAGNOSIS — Z98.890 OTHER SPECIFIED POSTPROCEDURAL STATES: Chronic | ICD-10-CM

## 2024-04-06 DIAGNOSIS — K42.9 UMBILICAL HERNIA WITHOUT OBSTRUCTION OR GANGRENE: Chronic | ICD-10-CM

## 2024-04-06 DIAGNOSIS — Z96.652 PRESENCE OF LEFT ARTIFICIAL KNEE JOINT: Chronic | ICD-10-CM

## 2024-04-06 DIAGNOSIS — Z98.89 OTHER SPECIFIED POSTPROCEDURAL STATES: Chronic | ICD-10-CM

## 2024-04-06 LAB
ALBUMIN SERPL ELPH-MCNC: 3.6 G/DL — SIGNIFICANT CHANGE UP (ref 3.3–5)
ALP SERPL-CCNC: 92 U/L — SIGNIFICANT CHANGE UP (ref 40–120)
ALT FLD-CCNC: 8 U/L — LOW (ref 10–45)
ANION GAP SERPL CALC-SCNC: 13 MMOL/L — SIGNIFICANT CHANGE UP (ref 5–17)
APTT BLD: 29.4 SEC — SIGNIFICANT CHANGE UP (ref 24.5–35.6)
AST SERPL-CCNC: 25 U/L — SIGNIFICANT CHANGE UP (ref 10–40)
BASOPHILS # BLD AUTO: 0.04 K/UL — SIGNIFICANT CHANGE UP (ref 0–0.2)
BASOPHILS NFR BLD AUTO: 0.4 % — SIGNIFICANT CHANGE UP (ref 0–2)
BILIRUB SERPL-MCNC: 0.6 MG/DL — SIGNIFICANT CHANGE UP (ref 0.2–1.2)
BUN SERPL-MCNC: 42 MG/DL — HIGH (ref 7–23)
CALCIUM SERPL-MCNC: 9 MG/DL — SIGNIFICANT CHANGE UP (ref 8.4–10.5)
CHLORIDE SERPL-SCNC: 102 MMOL/L — SIGNIFICANT CHANGE UP (ref 96–108)
CO2 SERPL-SCNC: 21 MMOL/L — LOW (ref 22–31)
CREAT SERPL-MCNC: 1.69 MG/DL — HIGH (ref 0.5–1.3)
CRP SERPL-MCNC: 97 MG/L — HIGH (ref 0–4)
EGFR: 42 ML/MIN/1.73M2 — LOW
EOSINOPHIL # BLD AUTO: 0.07 K/UL — SIGNIFICANT CHANGE UP (ref 0–0.5)
EOSINOPHIL NFR BLD AUTO: 0.7 % — SIGNIFICANT CHANGE UP (ref 0–6)
ERYTHROCYTE [SEDIMENTATION RATE] IN BLOOD: 35 MM/HR — HIGH (ref 0–20)
FLUAV AG NPH QL: SIGNIFICANT CHANGE UP
FLUBV AG NPH QL: SIGNIFICANT CHANGE UP
GLUCOSE SERPL-MCNC: 203 MG/DL — HIGH (ref 70–99)
HCT VFR BLD CALC: 35.2 % — LOW (ref 39–50)
HGB BLD-MCNC: 11.1 G/DL — LOW (ref 13–17)
IMM GRANULOCYTES NFR BLD AUTO: 0.5 % — SIGNIFICANT CHANGE UP (ref 0–0.9)
INR BLD: 1.28 RATIO — HIGH (ref 0.85–1.18)
LYMPHOCYTES # BLD AUTO: 0.97 K/UL — LOW (ref 1–3.3)
LYMPHOCYTES # BLD AUTO: 9.2 % — LOW (ref 13–44)
MCHC RBC-ENTMCNC: 29 PG — SIGNIFICANT CHANGE UP (ref 27–34)
MCHC RBC-ENTMCNC: 31.5 GM/DL — LOW (ref 32–36)
MCV RBC AUTO: 91.9 FL — SIGNIFICANT CHANGE UP (ref 80–100)
MONOCYTES # BLD AUTO: 1.13 K/UL — HIGH (ref 0–0.9)
MONOCYTES NFR BLD AUTO: 10.7 % — SIGNIFICANT CHANGE UP (ref 2–14)
NEUTROPHILS # BLD AUTO: 8.27 K/UL — HIGH (ref 1.8–7.4)
NEUTROPHILS NFR BLD AUTO: 78.5 % — HIGH (ref 43–77)
NRBC # BLD: 0 /100 WBCS — SIGNIFICANT CHANGE UP (ref 0–0)
PLATELET # BLD AUTO: 142 K/UL — LOW (ref 150–400)
POTASSIUM SERPL-MCNC: 4.5 MMOL/L — SIGNIFICANT CHANGE UP (ref 3.5–5.3)
POTASSIUM SERPL-SCNC: 4.5 MMOL/L — SIGNIFICANT CHANGE UP (ref 3.5–5.3)
PROT SERPL-MCNC: 6.4 G/DL — SIGNIFICANT CHANGE UP (ref 6–8.3)
PROTHROM AB SERPL-ACNC: 14 SEC — HIGH (ref 9.5–13)
RBC # BLD: 3.83 M/UL — LOW (ref 4.2–5.8)
RBC # FLD: 14.3 % — SIGNIFICANT CHANGE UP (ref 10.3–14.5)
RSV RNA NPH QL NAA+NON-PROBE: SIGNIFICANT CHANGE UP
SARS-COV-2 RNA SPEC QL NAA+PROBE: SIGNIFICANT CHANGE UP
SODIUM SERPL-SCNC: 136 MMOL/L — SIGNIFICANT CHANGE UP (ref 135–145)
WBC # BLD: 10.53 K/UL — HIGH (ref 3.8–10.5)
WBC # FLD AUTO: 10.53 K/UL — HIGH (ref 3.8–10.5)

## 2024-04-06 PROCEDURE — 99285 EMERGENCY DEPT VISIT HI MDM: CPT

## 2024-04-06 PROCEDURE — 73562 X-RAY EXAM OF KNEE 3: CPT | Mod: 26,RT

## 2024-04-06 PROCEDURE — 93971 EXTREMITY STUDY: CPT | Mod: 26,RT

## 2024-04-06 RX ORDER — MORPHINE SULFATE 50 MG/1
4 CAPSULE, EXTENDED RELEASE ORAL ONCE
Refills: 0 | Status: DISCONTINUED | OUTPATIENT
Start: 2024-04-06 | End: 2024-04-06

## 2024-04-06 RX ORDER — ACETAMINOPHEN 500 MG
975 TABLET ORAL ONCE
Refills: 0 | Status: COMPLETED | OUTPATIENT
Start: 2024-04-06 | End: 2024-04-06

## 2024-04-06 RX ORDER — OXYCODONE HYDROCHLORIDE 5 MG/1
5 TABLET ORAL ONCE
Refills: 0 | Status: DISCONTINUED | OUTPATIENT
Start: 2024-04-06 | End: 2024-04-06

## 2024-04-06 RX ADMIN — MORPHINE SULFATE 4 MILLIGRAM(S): 50 CAPSULE, EXTENDED RELEASE ORAL at 04:43

## 2024-04-06 RX ADMIN — OXYCODONE HYDROCHLORIDE 5 MILLIGRAM(S): 5 TABLET ORAL at 08:14

## 2024-04-06 RX ADMIN — Medication 975 MILLIGRAM(S): at 07:50

## 2024-04-06 RX ADMIN — MORPHINE SULFATE 4 MILLIGRAM(S): 50 CAPSULE, EXTENDED RELEASE ORAL at 07:50

## 2024-04-06 RX ADMIN — Medication 975 MILLIGRAM(S): at 04:44

## 2024-04-06 RX ADMIN — OXYCODONE HYDROCHLORIDE 5 MILLIGRAM(S): 5 TABLET ORAL at 09:19

## 2024-04-06 NOTE — ED ADULT NURSE REASSESSMENT NOTE - NS ED NURSE REASSESS COMMENT FT1
Received report from LI Vaughan. pt A&Ox4 able to follow all commands. Pulse, motor, sensation present and equal in all 4 extremities. Pt able to follow all commands. Pulse, motor, sensation present and equal in all 4 extremities. pt Breathing spontaneous and unlabored on room air, Skin warm and dry and of color appropriate for ethnicity , moves all extremities, speech clear. admitting provider and ortho team aware of pt tempt of 101.1. instructed to give pain medication 5 of oxy . no further nurse intervention needed at this time. Received report from LI Vaughan. pt A&Ox4 able to follow all commands. Pulse, motor, sensation present and equal in all 4 extremities. Pt able to follow all commands. Pulse, motor, sensation present and equal in all 4 extremities. pt Breathing spontaneous and unlabored on room air, Skin warm and dry and of color appropriate for ethnicity , moves all extremities, speech clear. admitting provider and ortho team aware of pt tempt of 101.1. ortho team not concerned with fever state it is common in this stage. pt to be DC on pain medication and follow up with provider in office during week. instructed to give pain medication 5 of oxy . no further nurse intervention needed at this time.

## 2024-04-06 NOTE — ED PROVIDER NOTE - PROGRESS NOTE DETAILS
Johnathon Peter MD PGY2: ortho seen pt, will follow us results, to appreciate recs. Alpesh PGY3: Ortho team at bedside, recommend to change oxy dosing that he already has at home to 10 q6h. Knee per their eval at bedside, does not appear infected or source of fever. Febrile in ED which they are aware of, POD2 so less concern for acute infection. Possibly atelectasis. Will viral swab/CXR before dc. Alpesh PGY3: dr zamarripa at bedside, agree to above plan. No resp sxs. Okay to dc without cxr in setting of no complaints. Alpesh PGY3: Ortho team at bedside, recommend to change oxy dosing that he already has at home to 10 q6h. Knee per their eval at bedside, does not appear infected or source of fever. Febrile in ED which they are aware of, POD2 so less concern for acute infection. Possibly atelectasis. Will viral swab before dc.

## 2024-04-06 NOTE — ED PROVIDER NOTE - PATIENT PORTAL LINK FT
You can access the FollowMyHealth Patient Portal offered by [facility] by registering at the following website: [email or web address]. By joining [facility]’s FollowMyHealth portal, you will also be able to view your health information using other applications (apps) compatible with our system.

## 2024-04-06 NOTE — ED ADULT NURSE NOTE - NSFALLRISKFACTORS_ED_ALL_ED
Coagulation: Bleeding disorder either through use of anticoagulants or underlying clinical condition(s)/Surgery: Recent surgery, recent lower limb amputation, major abdominal or thoracic surgery

## 2024-04-06 NOTE — ED PROVIDER NOTE - OBJECTIVE STATEMENT
Patient is a 73-year-old male past medical history significant for right knee replacement presenting for right knee pain.  Patient is postop day 2 status post right knee replacement with Dr. Dubois, states that upon leaving the hospital yesterday he was feeling gradually worsening pain to his right knee, worse with movement of his knee.  Additionally endorsing some swelling to that knee as well, without redness, fevers, or pain elsewhere including to the distal extremity.  No injuries in the interim to the knee.  States that now with the knee pain is much worse.  Has been taking his tramadol, Tylenol, oxycodone as instructed, last at 8:30 PM last night, with minimal improvement.  Was told to start taking Eliquis today has not yet started.  No shortness of breath, chest pain, palpitations.

## 2024-04-06 NOTE — ED ADULT NURSE NOTE - NS ED NURSE LEVEL OF CONSCIOUSNESS MENTAL STATUS
Last Rx 23 qty 90 w/1rf     Name: Marlene LINDQUIST Antonio  :  1946     Date of last appointment:  9/15/2023   Date of next appointment:  2024   Best number to reach patient:  [phone]     
Awake/Alert

## 2024-04-06 NOTE — CONSULT NOTE ADULT - ATTENDING COMMENTS
I agree with the above note and have personally seen and examined this patient. All pertinent films have been reviewed. Please refer to clinical documentation of the history, physical examinations, data summary, and both assessment and plan as documented above and with which I agree.    POD2 s/p RTKA. Did great in hospital. Went home and misunderstood pain medication regimen and got behind on pain further exacerbated by history of anxiety.  Knee exam shows prevena intact, rom 0-90, moderate knee swelling as expected, thigh and leg compartments soft, 2+dp/pt, 5/5 ehl/gcs/ehl, spn/dpn silt  reviewed pain regimen with patient and spouse  tylenol 1000mg q8h  tramadol 50mg q6h prn mild pain  oxycodone 5mg q4-6h prn moderate pain  oxycodone 10mg q4-6h prn severe pain  ice, elevate  wbat  okay to dc home    Hector Dubois MD  Attending Orthopedic Surgeon

## 2024-04-06 NOTE — ED ADULT NURSE NOTE - OBJECTIVE STATEMENT
74 yo male PMH HLD, DM, HTN on eliquis, Right TKR, A&ox3, presents to ED c/o R knee pain.  PT reports had R TKR x 2days ago, d/c yesterday with R knee drain, today increasing pain/swelling no relief with pain meds at home. BReathing even and unlabored, abdomen soft nontender, R knee slight swelling w/ wound vac no drainage, no warmth noted, no pedal edema.  Pt denies chest pain, palpitations, shortness of breath, headache, visual disturbances, numbness/tingling, fever, chills, diaphoresis,  nausea, vomiting, constipation, diarrhea, or urinary symptoms.

## 2024-04-06 NOTE — CONSULT NOTE ADULT - SUBJECTIVE AND OBJECTIVE BOX
HPI  73yMale w/ PMH of HTN, DM, CAD, GERD c/o R knee pain s/p R TKA on 4/4. Patient states he got home around 8pm, took an oxy, then two hours later noted the pain getting worse, so he took 2 tylenols. A couple hours after that patient took one tramadol, but the pain was not getting any better so he presented to the [facility] ED. Denies numbness/tingling in the RLE. Denies any  trauma/injuries at this time.     ROS  Negative unless otherwise specified in HPI.    PAST MEDICAL & SURGICAL Hx  PAST MEDICAL & SURGICAL HISTORY:  Lower back pain  radiating to right knee      HTN (hypertension)      DM (diabetes mellitus)  type 2      History of throat cancer  Surgery with radiation in 2012, in remission.      CAD (coronary artery disease)      HLD (hyperlipidemia)      Current use of long term anticoagulation      GERD (gastroesophageal reflux disease)      BPH (benign prostatic hypertrophy)      Gout      Gastritis      Left kidney mass      Lung cancer  Stage 0, 2023, surgically emoved no Chemo or radiation needed.      Anemia      Anxiety and depression      Osteoarthritis      H/O total knee replacement, left      S/P arthroscopy of knee  2016      H/O heart artery stent  x2      History of laryngoscopy      History of lung surgery  2023      History of back surgery      H/O colonoscopy      Umbilical hernia  repair in 2023          MEDICATIONS  Home Medications:  amLODIPine 10 mg oral tablet: 1 tab(s) orally once a day (04 Apr 2024 14:14)  aspirin 81 mg oral capsule: 1 cap(s) orally once a day (05 Apr 2024 16:22)  atorvastatin 80 mg oral tablet: 1 tab(s) orally once a day (at bedtime) (04 Apr 2024 14:14)  Breztri Aerosphere 160 mcg-9 mcg-4.8 mcg/inh inhalation aerosol: 2 puff(s) inhaled (04 Apr 2024 14:15)  carvedilol 25 mg oral tablet: 1 tab(s) orally 2 times a day (04 Apr 2024 14:15)  Effexor  mg oral capsule, extended release: 1 cap(s) orally once a day (04 Apr 2024 14:14)  Flomax 0.4 mg oral capsule: 1 cap(s) orally once a day (04 Apr 2024 14:15)  Jardiance 25 mg oral tablet: 1 tab(s) orally once a day (in the morning) (04 Apr 2024 14:15)  metFORMIN 500 mg oral tablet: 1 orally 2 times a day (04 Apr 2024 14:15)  methIMAzole 5 mg oral tablet: 1 orally once a day (04 Apr 2024 14:15)  olmesartan-hydrochlorothiazide 40 mg-12.5 mg oral tablet: 1 tab(s) orally once a day (04 Apr 2024 14:15)  omeprazole 40 mg oral delayed release capsule: 1 cap(s) orally once a day (04 Apr 2024 14:15)  Plavix 75 mg oral tablet: 1 tablet orally once a day Resume on 4/19 after Eliquis completed (05 Apr 2024 16:22)  polyethylene glycol 3350 oral powder for reconstitution: 17 gram(s) orally once a day (at bedtime) (05 Apr 2024 16:22)  Trulicity Pen 1.5 mg/0.5 mL subcutaneous solution: 1.5 subcutaneously every 7 days (04 Apr 2024 14:15)  Xanax 1 mg oral tablet: orally once a day as needed for - (04 Apr 2024 14:15)  Zetia 10 mg oral tablet: 1 tab(s) orally once a day (04 Apr 2024 14:14)      ALLERGIES  No Known Allergies      FAMILY Hx  FAMILY HISTORY:  No pertinent family history in first degree relatives        SOCIAL Hx  Social History:      VITALS  Vital Signs Last 24 Hrs  T(C): 37.3 (06 Apr 2024 03:49), Max: 37.3 (06 Apr 2024 03:49)  T(F): 99.1 (06 Apr 2024 03:49), Max: 99.1 (06 Apr 2024 03:49)  HR: 78 (06 Apr 2024 05:26) (71 - 88)  BP: 156/62 (06 Apr 2024 05:26) (121/66 - 156/62)  BP(mean): --  RR: 22 (06 Apr 2024 05:26) (18 - 22)  SpO2: 97% (06 Apr 2024 05:26) (93% - 97%)    Parameters below as of 06 Apr 2024 05:26  Patient On (Oxygen Delivery Method): room air        PHYSICAL EXAM  Gen: Lying in bed, NAD  Resp: No increased WOB  RLE:  Prevena holding suction, +edema  over R knee  +TTP over R knee, no TTP along remainder of extremity; compartments soft  Limited ROM at knee 2/2 pain  Motor: TA/EHL/GS/FHL intact  Sensory: DP/SP/Tib/Hermelinda/Saph SILT  +DP pulse (symmetric relative to contralateral side), WWP    LABS                        11.1   10.53 )-----------( 142      ( 06 Apr 2024 04:54 )             35.2     04-06    136  |  102  |  42<H>  ----------------------------<  203<H>  4.5   |  21<L>  |  1.69<H>    Ca    9.0      06 Apr 2024 04:54    TPro  6.4  /  Alb  3.6  /  TBili  0.6  /  DBili  x   /  AST  25  /  ALT  8<L>  /  AlkPhos  92  04-06    PT/INR - ( 06 Apr 2024 04:54 )   PT: 14.0 sec;   INR: 1.28 ratio         PTT - ( 06 Apr 2024 04:54 )  PTT:29.4 sec    IMAGING  XRs: Well placed components of TKA with staples

## 2024-04-06 NOTE — ED ADULT TRIAGE NOTE - CHIEF COMPLAINT QUOTE
R knee pain/swelling, s/p R knee TKR x 2 days ago by dr alice zamarripa, minimal ambulation, took tramadol 50 mg x 1 hr PTA, also taking oxy/tylenol for pain, +has R knee drain with minimal output

## 2024-04-06 NOTE — ED PROVIDER NOTE - ATTENDING CONTRIBUTION TO CARE
Emergency Medicine Attending MD Aviles:  patient seen and evaluated with the resident.  I was present for key portions of the History & Physical, and I agree with the Impression & Plan.    Patient is a 73-year-old male complaining of right knee pain, 2 days status post right total knee arthroplasty(MANNY) on 4/4/2024 with Dr. Dubois.  Patient discharged with prescriptions for acetaminophen, oxycodone 5 mg every 4 hours for pain.    Patient got home from the hospital less than 12 hours ago.  As soon as he tried to range his knee at home the pain was 10 out of 10.  At rest the pain is 5 out of 10.    Associated symptoms: No fevers, no chills.    VS: wnl  Gen: Well appearing elderly male in mild distress  Head: NC/AT  Neck: trachea midline  Resp:  No distress  CV: RRR, no RMG  Abd: nondistended  Ext: Right lower extremity with wound VAC on the anterior aspect of the knee.  Mild associated edema of the knee,   No calf edema.  Neuro:  A&Ox4 appears non focal  Skin:  Warm and dry as visualized  Psych:  Normal affect and mood    Medical decision Making / Differential Diagnosis:  Given the acute nature of his pain, it is possible that the patient had a femoral block that acutely wore off shortly after discharge from the hospital.  At rest the patient is clinically well-appearing.  Suspicion for acute infection is low at this time.  Suspicion for acute DVT is low at this time.    Will discuss case with orthopedics.

## 2024-04-06 NOTE — CONSULT NOTE ADULT - ASSESSMENT
ASSESSMENT & PLAN  73yMale s/p R TKA 2 days ago with poor pain control    -WBAT  -pain control  -ice/cold compress, elevation  - FU Dopplers of RLE    For all questions related to patient care, please reach out to the on-call team via the pager.     Keshia Johnston, PGY 2  Orthopaedic Surgery  [facility] [id number]  [facility] [id number]  [facility] [phone]

## 2024-04-06 NOTE — ED ADULT NURSE NOTE - CAS ELECT INFOMATION PROVIDED
provided to pt and family members at bedside. pt instructed to follow up with ortho this coming week/DC instructions

## 2024-04-06 NOTE — ED PROVIDER NOTE - PHYSICAL EXAMINATION
CONSTITUTIONAL: awake, appears uncomfortable  SKIN: wound vac to R knee anteriorly, to suction, no drainage visible. No r knee warmth or erythema.  HEAD: Normocephalic; atraumatic.  EYES: no conjunctival injection. no scleral icterus  ENT: No nasal discharge; airway clear.  CARD: S1, S2 normal; no murmurs, gallops, or rubs. Regular rate and rhythm.   RESP: No wheezes, rales or rhonchi. Good air movement bilaterally.   ABD: soft ntnd, no guarding, no distention, no rigidity.   EXT: Skin and vac as above. Pain with passive rom to r knee, unable to actively range. Good pulses and sensation distally, no calf swelling, no calf tenderness.  NEURO: Alert, oriented, grossly unremarkable  PSYCH: Cooperative, appropriate.

## 2024-04-06 NOTE — ED PROVIDER NOTE - NSFOLLOWUPINSTRUCTIONS_ED_ALL_ED_FT
Post-operative Knee Pain    PAIN CONTROL REGIMEN:  - Tylenol 1000mg every 8 hours for next 3 days.  - Can take tramadol 50mg every 8 hours as needed for moderate pain.   - Can take oxycodone 10mg every 6 hours as needed for severe pain.    - Recommend to monitor knee site for discharge, redness, or worsening swelling.   - Follow-up with Dr Dubois this coming week.    Rest, eat as tolerated.  Advance activity as tolerated.  Continue any other previously prescribed medications as directed.  Follow up with your primary care physician in 48-72 hours- bring copies of your results.  Return to the ER for worsening or persistent symptoms, and/or ANY NEW OR CONCERNING SYMPTOMS.

## 2024-04-06 NOTE — ED PROVIDER NOTE - CLINICAL SUMMARY MEDICAL DECISION MAKING FREE TEXT BOX
Medical decision Making / Differential Diagnosis:  Given the acute nature of his pain, it is possible that the patient had a femoral block that acutely wore off shortly after discharge from the hospital.  At rest the patient is clinically well-appearing.  Suspicion for acute infection is low at this time.  Suspicion for acute DVT is low at this time.    Will discuss case with orthopedics.

## 2024-04-08 ENCOUNTER — NON-APPOINTMENT (OUTPATIENT)
Age: 74
End: 2024-04-08

## 2024-04-09 PROBLEM — M19.90 UNSPECIFIED OSTEOARTHRITIS, UNSPECIFIED SITE: Chronic | Status: ACTIVE | Noted: 2024-03-14

## 2024-04-09 PROBLEM — C34.90 MALIGNANT NEOPLASM OF UNSPECIFIED PART OF UNSPECIFIED BRONCHUS OR LUNG: Chronic | Status: ACTIVE | Noted: 2023-07-19

## 2024-04-19 ENCOUNTER — NON-APPOINTMENT (OUTPATIENT)
Age: 74
End: 2024-04-19

## 2024-04-19 ENCOUNTER — APPOINTMENT (OUTPATIENT)
Dept: ORTHOPEDIC SURGERY | Facility: CLINIC | Age: 74
End: 2024-04-19
Payer: MEDICARE

## 2024-04-19 VITALS — WEIGHT: 200 LBS | HEIGHT: 65 IN | BODY MASS INDEX: 33.32 KG/M2

## 2024-04-19 PROCEDURE — 99024 POSTOP FOLLOW-UP VISIT: CPT

## 2024-04-19 PROCEDURE — 73564 X-RAY EXAM KNEE 4 OR MORE: CPT | Mod: 26,RT

## 2024-04-29 ENCOUNTER — APPOINTMENT (OUTPATIENT)
Dept: OTOLARYNGOLOGY | Facility: CLINIC | Age: 74
End: 2024-04-29
Payer: MEDICARE

## 2024-04-29 VITALS
SYSTOLIC BLOOD PRESSURE: 94 MMHG | OXYGEN SATURATION: 97 % | DIASTOLIC BLOOD PRESSURE: 65 MMHG | HEIGHT: 65 IN | HEART RATE: 86 BPM | TEMPERATURE: 98.1 F | BODY MASS INDEX: 33.32 KG/M2 | WEIGHT: 200 LBS

## 2024-04-29 DIAGNOSIS — C32.0 MALIGNANT NEOPLASM OF GLOTTIS: ICD-10-CM

## 2024-04-29 PROCEDURE — 31579 LARYNGOSCOPY TELESCOPIC: CPT

## 2024-04-29 PROCEDURE — 99215 OFFICE O/P EST HI 40 MIN: CPT | Mod: 25

## 2024-04-29 NOTE — PROCEDURE
[de-identified] :  - Procedure Note Pre-operative Diagnosis: hx of left vocal fold cancer Post-operative Diagnosis: Previously diagnosed lesions now resolved, post XRT changes noted. Anesthesia: Topical - 1 % Lidocaine/Phenylephrine  Procedure: Flexible Laryngoscopy with Stroboscopy  Procedure Details:  The patient was placed in the sitting position. After decongestant and anesthesia were applied the laryngoscope was passed. The nasal cavities, nasopharynx, oropharynx, hypopharynx, and larynx were all examined. Vocal folds were examined during respiration and phonation. The following findings were noted:    Findings: Nose: Septum is midline, turbinates are normal, nasal airways patent, mucosa normal Nasopharynx: Adenoids normal, no masses, eustachian tube normal Oropharynx: Pharyngeal walls symmetric and without lesion. Tonsils/fossae symmetric Hypopharynx: Hypopharynx and pyriform sinuses without lesion. No masses or asymmetry. No pooling of secretions. Larynx: Epiglottis and aryepiglottic folds were sharp and crisp bilaterally. Bilateral false vocal folds normal appearance. Airway was widely patent.   Strobe Exam Ratings  TVF Appearance: mild edema and scarring, worse on the right post VF, post XRT changes TVF Mobility: reduced abduction, but airway patent Edema/hypertrophy: mild Mucus on TVF: normal Glottic Closure: adequate Mucosal Wave: reduced Amplitude of Vibration: reduced Phase: symmetric Supraglottic Hyperfunction: +supraglottic compression  Other Findings: none Condition: Stable. Patient tolerated procedure well. Complications: None.

## 2024-04-29 NOTE — HISTORY OF PRESENT ILLNESS
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 This was first dx in 2011 and treated with XRT. In 2016 and again in 2017 he underwent rebiopsy for suspicious lesions concerning for recurrence, both times negative. Today he notes no issues or changes in terms of chewing, eating or swallowing. No voice changes and no breathing issues. He denies any issues with fevers, night sweats, or unplanned weight loss. No referred otalgia. No new neck masses, or lesions. He actually notes voice improvement. 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    Former smoker, quit several years ago.  -  Update 5/17/22  Patient with patient is overall stable and well. No changes to his voice. No issues chewing, eating, or swallowing. No breathing issues. He has noted some laryngitis over the past few days as he has been raising his voice at home but otherwise he has been normal. He denies any fevers, night sweats, weight loss, referred otalgia, new neck masses. He denies any new ear, nose, throat symptoms otherwise.  -  Interval History: 11/15/22  At the time of the last visit I requested a chest x-ray which was not completed. The following month he actually did have a CT chest and was ultimately diagnosed with lung cancer, left upper lobe. The patient underwent resection without need for chemotherapy or XRT. Per the patient he is now cancer free. During that work-up he did complete a PET/CT and this was negative for any disease in the neck.    Patient with patient is overall stable and well. No changes to his voice. No issues chewing, eating, or swallowing. No breathing issues. He denies any fevers, night sweats, weight loss, referred otalgia, new neck masses. He denies any new ear, nose, throat symptoms otherwise.  -  4/13/23  Pt overall well and stable. For the past month, he reports sensation of something stuck on the right side of his throat. He feels this when he's swallowing. Denies difficulty chewing, eating or swallowing. No breathing issues. No voice changes. No fevers, night sweats, weight loss.  -   Interval History: 5/23/2023  Previous visit a vocal lesion was noted and patient presents for surveillance. No issues chewing, eating, swallowing. No changes in voice or breathing issues. No fevers night sweats weight loss or referred otalgia. No new ear, nose, throat symptoms otherwise. - 8/1/23 Pt presents today for follow up of vocal fold lesion. No issues chewing, eating, swallowing. No changes in voice or breathing issues. No fevers night sweats weight loss or referred otalgia. No new ear, nose, throat symptoms otherwise. - [FreeTextEntry1] : 10/3/23 Pt presents today for follow up of vocal fold lesion. No issues chewing, eating, swallowing. No changes in voice or breathing issues. No fevers night sweats weight loss or referred otalgia. No new ear, nose, throat symptoms otherwise.  Did have recent imaging of his lungs, but CT scan was not sent to me for review. - 4/29/24 Overall stable and well.  No new ear, nose, throat symptoms.  No fevers, night sweats, weight loss.  He is not smoking.  No new ear, nose, throat symptoms otherwise.

## 2024-04-29 NOTE — ASSESSMENT
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 This was first dx in 2011 and treated with XRT. Since the last visit he was diagnosed with a left lung cancer which is already resected without need for chemo/XRT. Patient has recovered well. PET/CT was done at that time and patient have those results sent to me for review.  Currently, overall patient is stable and doing well. No new symptoms or concern for recurrence. Exam is consistent with post XRT changes to the larynx, today there was a raised white plaque left anterior vocal fold 2 mm. At this time I am recommending follow-up in 2 months. Should this persist we will plan for operative biopsy he knows to follow-up sooner should symptoms worsen or change. Evidence of supraglottic compression and speech evaluation offered. Pt would like to observe for now. Follow up 6 mo.  10/3/2023 patient overall stable. Exam today shows resolution of previous lesions noted on the vocal folds.. At this time, I am recommending canceling surgery and follow up in 2 months for repeat evaluation.  4/29/24: No changes in symptoms.  No new ear, nose, throat symptoms.  Exam from previous remains unchanged.  Vocal folds with postradiation changes but no new masses or lesions.  At this time I am recommending follow-up in 3 months, sooner should symptoms worsen or fail to improve  - fu 3 months for repeat evaluation - fu sooner if symptoms worsen or fail to improve

## 2024-04-29 NOTE — PHYSICAL EXAM
[FreeTextEntry1] : normal voice, mild hoarseness, mildly reduced range. adequate projection [Normal] : mucosa is normal [Midline] : trachea located in midline position [Laryngoscopy Performed] : laryngoscopy was performed, see procedure section for findings

## 2024-04-30 ENCOUNTER — APPOINTMENT (OUTPATIENT)
Dept: INTERNAL MEDICINE | Facility: CLINIC | Age: 74
End: 2024-04-30

## 2024-05-06 ENCOUNTER — APPOINTMENT (OUTPATIENT)
Dept: INTERNAL MEDICINE | Facility: CLINIC | Age: 74
End: 2024-05-06

## 2024-05-17 ENCOUNTER — RX RENEWAL (OUTPATIENT)
Age: 74
End: 2024-05-17

## 2024-05-23 ENCOUNTER — APPOINTMENT (OUTPATIENT)
Dept: ORTHOPEDIC SURGERY | Facility: CLINIC | Age: 74
End: 2024-05-23
Payer: MEDICARE

## 2024-05-23 ENCOUNTER — APPOINTMENT (OUTPATIENT)
Dept: INTERNAL MEDICINE | Facility: CLINIC | Age: 74
End: 2024-05-23

## 2024-05-23 VITALS — BODY MASS INDEX: 33.32 KG/M2 | HEIGHT: 65 IN | WEIGHT: 200 LBS

## 2024-05-23 PROCEDURE — 99024 POSTOP FOLLOW-UP VISIT: CPT

## 2024-05-23 NOTE — DISCUSSION/SUMMARY
[de-identified] : Recovering nicely from a R TKA. Continue WBAT. Follow up at the 6 month anniversary from surgery.

## 2024-05-23 NOTE — PHYSICAL EXAM
[de-identified] : Well developed, well nourished in no apparent distress, awake, alert and orientated to person, place and time with appropriate mood and affect Respirations are even and unlabored. Gait evaluation does not reveal a limp. There is no inguinal adenopathy. The affected limb is well-perfused with palpable pedal pulse, without skin lesions, shows a grossly normal motor and sensory examination. Incision is CDI. Knee motion is 0-125

## 2024-05-23 NOTE — HISTORY OF PRESENT ILLNESS
[de-identified] : Status-post right  total knee  arthroplasty here for routine postoperative evaluation. Excellent progress is noted in terms of pain and restoration of function. Pain is well controlled with oral medications. There has been no change in medical health since discharge. The patient does not require assistive devices.

## 2024-06-03 ENCOUNTER — NON-APPOINTMENT (OUTPATIENT)
Age: 74
End: 2024-06-03

## 2024-06-07 ENCOUNTER — APPOINTMENT (OUTPATIENT)
Dept: ORTHOPEDIC SURGERY | Facility: CLINIC | Age: 74
End: 2024-06-07
Payer: MEDICARE

## 2024-06-07 VITALS — WEIGHT: 200 LBS | HEIGHT: 65 IN | BODY MASS INDEX: 33.32 KG/M2

## 2024-06-07 DIAGNOSIS — Z96.651 PRESENCE OF RIGHT ARTIFICIAL KNEE JOINT: ICD-10-CM

## 2024-06-07 PROCEDURE — 99024 POSTOP FOLLOW-UP VISIT: CPT

## 2024-06-07 NOTE — PHYSICAL EXAM
[de-identified] : Well developed, well nourished in no apparent distress, awake, alert and orientated to person, place and time with appropriate mood and affect Respirations are even and unlabored. Gait evaluation does not reveal a limp. There is no inguinal adenopathy. The affected limb is well-perfused with palpable pedal pulse, without skin lesions, shows a grossly normal motor and sensory examination. Incision is CDI.  With a scab fell off there is no evidence erythema or drainage or fluctuance.  Knee motion is 0-125

## 2024-06-07 NOTE — DISCUSSION/SUMMARY
[de-identified] : Recovering nicely from a R TKA. Continue WBAT.  Recommended to apply bacitracin which was done today to the area where the scab fell off.  No evidence of infection.  Follow up at the 6 month anniversary from surgery.

## 2024-06-07 NOTE — HISTORY OF PRESENT ILLNESS
[de-identified] : Status-post right  total knee  arthroplasty here for routine postoperative evaluation. Excellent progress is noted in terms of pain and restoration of function. Pain is well controlled with oral medications. There has been no change in medical health since discharge. The patient does not require assistive devices.  He came in today because he was worried about his incision where scab fell off.  No fevers or chills.  No drainage.
English

## 2024-06-17 ENCOUNTER — APPOINTMENT (OUTPATIENT)
Dept: INTERNAL MEDICINE | Facility: CLINIC | Age: 74
End: 2024-06-17
Payer: MEDICARE

## 2024-06-17 VITALS
BODY MASS INDEX: 32.65 KG/M2 | SYSTOLIC BLOOD PRESSURE: 136 MMHG | HEIGHT: 65 IN | OXYGEN SATURATION: 96 % | DIASTOLIC BLOOD PRESSURE: 73 MMHG | RESPIRATION RATE: 14 BRPM | HEART RATE: 89 BPM | TEMPERATURE: 98.2 F | WEIGHT: 196 LBS

## 2024-06-17 DIAGNOSIS — I50.9 HEART FAILURE, UNSPECIFIED: ICD-10-CM

## 2024-06-17 DIAGNOSIS — D64.9 ANEMIA, UNSPECIFIED: ICD-10-CM

## 2024-06-17 DIAGNOSIS — M17.11 UNILATERAL PRIMARY OSTEOARTHRITIS, RIGHT KNEE: ICD-10-CM

## 2024-06-17 DIAGNOSIS — E78.5 HYPERLIPIDEMIA, UNSPECIFIED: ICD-10-CM

## 2024-06-17 DIAGNOSIS — I10 ESSENTIAL (PRIMARY) HYPERTENSION: ICD-10-CM

## 2024-06-17 DIAGNOSIS — F32.A ANXIETY DISORDER, UNSPECIFIED: ICD-10-CM

## 2024-06-17 DIAGNOSIS — F41.9 ANXIETY DISORDER, UNSPECIFIED: ICD-10-CM

## 2024-06-17 DIAGNOSIS — N40.0 BENIGN PROSTATIC HYPERPLASIA WITHOUT LOWER URINARY TRACT SYMPMS: ICD-10-CM

## 2024-06-17 DIAGNOSIS — I25.10 ATHEROSCLEROTIC HEART DISEASE OF NATIVE CORONARY ARTERY W/OUT ANGINA PECTORIS: ICD-10-CM

## 2024-06-17 DIAGNOSIS — M10.9 GOUT, UNSPECIFIED: ICD-10-CM

## 2024-06-17 DIAGNOSIS — J30.9 ALLERGIC RHINITIS, UNSPECIFIED: ICD-10-CM

## 2024-06-17 PROCEDURE — 99214 OFFICE O/P EST MOD 30 MIN: CPT

## 2024-06-17 PROCEDURE — G2211 COMPLEX E/M VISIT ADD ON: CPT

## 2024-06-17 PROCEDURE — 36415 COLL VENOUS BLD VENIPUNCTURE: CPT

## 2024-06-17 RX ORDER — OXYCODONE 5 MG/1
5 TABLET ORAL EVERY 8 HOURS
Qty: 42 | Refills: 0 | Status: DISCONTINUED | COMMUNITY
Start: 2024-05-01 | End: 2024-06-17

## 2024-06-17 RX ORDER — CETIRIZINE HYDROCHLORIDE 10 MG/1
10 TABLET, FILM COATED ORAL DAILY
Qty: 14 | Refills: 0 | Status: ACTIVE | COMMUNITY
Start: 2024-06-17 | End: 1900-01-01

## 2024-06-17 RX ORDER — OXYCODONE 5 MG/1
5 TABLET ORAL
Qty: 40 | Refills: 0 | Status: DISCONTINUED | COMMUNITY
Start: 2024-05-23 | End: 2024-06-17

## 2024-06-17 RX ORDER — ALPRAZOLAM 1 MG/1
1 TABLET ORAL
Qty: 60 | Refills: 0 | Status: ACTIVE | COMMUNITY
Start: 2021-08-12 | End: 1900-01-01

## 2024-06-17 NOTE — HISTORY OF PRESENT ILLNESS
[FreeTextEntry1] :  Patient here for periodic assessment and blood work. [de-identified] :  Patient here for periodic assessment and blood work.

## 2024-06-18 LAB
ALBUMIN SERPL ELPH-MCNC: 4.2 G/DL
ALP BLD-CCNC: 135 U/L
ALT SERPL-CCNC: 14 U/L
ANION GAP SERPL CALC-SCNC: 13 MMOL/L
APPEARANCE: CLEAR
AST SERPL-CCNC: 19 U/L
BASOPHILS # BLD AUTO: 0.05 K/UL
BASOPHILS NFR BLD AUTO: 0.7 %
BILIRUB SERPL-MCNC: 0.5 MG/DL
BILIRUBIN URINE: NEGATIVE
BLOOD URINE: NEGATIVE
BUN SERPL-MCNC: 44 MG/DL
CALCIUM SERPL-MCNC: 9.2 MG/DL
CHLORIDE SERPL-SCNC: 104 MMOL/L
CHOLEST SERPL-MCNC: 112 MG/DL
CO2 SERPL-SCNC: 24 MMOL/L
COLOR: YELLOW
CREAT SERPL-MCNC: 1.75 MG/DL
CREAT SPEC-SCNC: 80 MG/DL
EGFR: 40 ML/MIN/1.73M2
EOSINOPHIL # BLD AUTO: 0.22 K/UL
EOSINOPHIL NFR BLD AUTO: 3.1 %
ESTIMATED AVERAGE GLUCOSE: 137 MG/DL
GLUCOSE QUALITATIVE U: >=1000 MG/DL
GLUCOSE SERPL-MCNC: 303 MG/DL
HBA1C MFR BLD HPLC: 6.4 %
HCT VFR BLD CALC: 38.8 %
HDLC SERPL-MCNC: 41 MG/DL
HGB BLD-MCNC: 12.3 G/DL
IMM GRANULOCYTES NFR BLD AUTO: 0.1 %
KETONES URINE: NEGATIVE MG/DL
LDLC SERPL CALC-MCNC: 49 MG/DL
LEUKOCYTE ESTERASE URINE: NEGATIVE
LYMPHOCYTES # BLD AUTO: 1.21 K/UL
LYMPHOCYTES NFR BLD AUTO: 16.9 %
MAN DIFF?: NORMAL
MCHC RBC-ENTMCNC: 29.3 PG
MCHC RBC-ENTMCNC: 31.7 GM/DL
MCV RBC AUTO: 92.4 FL
MICROALBUMIN 24H UR DL<=1MG/L-MCNC: 8.6 MG/DL
MICROALBUMIN/CREAT 24H UR-RTO: 107 MG/G
MONOCYTES # BLD AUTO: 0.43 K/UL
MONOCYTES NFR BLD AUTO: 6 %
NEUTROPHILS # BLD AUTO: 5.24 K/UL
NEUTROPHILS NFR BLD AUTO: 73.2 %
NITRITE URINE: NEGATIVE
NONHDLC SERPL-MCNC: 71 MG/DL
PH URINE: 6.5
PLATELET # BLD AUTO: 198 K/UL
POTASSIUM SERPL-SCNC: 5.4 MMOL/L
PROT SERPL-MCNC: 6.7 G/DL
PROTEIN URINE: NORMAL MG/DL
RBC # BLD: 4.2 M/UL
RBC # FLD: 14.9 %
SODIUM SERPL-SCNC: 141 MMOL/L
SPECIFIC GRAVITY URINE: 1.03
TRIGL SERPL-MCNC: 122 MG/DL
TSH SERPL-ACNC: 0.56 UIU/ML
UROBILINOGEN URINE: 1 MG/DL
WBC # FLD AUTO: 7.16 K/UL

## 2024-07-16 ENCOUNTER — RX RENEWAL (OUTPATIENT)
Age: 74
End: 2024-07-16

## 2024-07-26 ENCOUNTER — APPOINTMENT (OUTPATIENT)
Dept: OTOLARYNGOLOGY | Facility: CLINIC | Age: 74
End: 2024-07-26
Payer: MEDICARE

## 2024-07-26 VITALS
DIASTOLIC BLOOD PRESSURE: 78 MMHG | TEMPERATURE: 97.8 F | SYSTOLIC BLOOD PRESSURE: 154 MMHG | OXYGEN SATURATION: 97 % | HEART RATE: 69 BPM

## 2024-07-26 DIAGNOSIS — Z85.21 PERSONAL HISTORY OF MALIGNANT NEOPLASM OF LARYNX: ICD-10-CM

## 2024-07-26 DIAGNOSIS — C32.0 MALIGNANT NEOPLASM OF GLOTTIS: ICD-10-CM

## 2024-07-26 PROCEDURE — 99215 OFFICE O/P EST HI 40 MIN: CPT | Mod: 25

## 2024-07-26 PROCEDURE — 31579 LARYNGOSCOPY TELESCOPIC: CPT

## 2024-07-31 ENCOUNTER — RX RENEWAL (OUTPATIENT)
Age: 74
End: 2024-07-31

## 2024-07-31 ENCOUNTER — APPOINTMENT (OUTPATIENT)
Dept: ORTHOPEDIC SURGERY | Facility: CLINIC | Age: 74
End: 2024-07-31

## 2024-08-16 ENCOUNTER — LABORATORY RESULT (OUTPATIENT)
Age: 74
End: 2024-08-16

## 2024-08-16 ENCOUNTER — APPOINTMENT (OUTPATIENT)
Age: 74
End: 2024-08-16
Payer: MEDICARE

## 2024-08-16 VITALS
WEIGHT: 196 LBS | OXYGEN SATURATION: 98 % | RESPIRATION RATE: 16 BRPM | DIASTOLIC BLOOD PRESSURE: 65 MMHG | SYSTOLIC BLOOD PRESSURE: 193 MMHG | HEIGHT: 65 IN | HEART RATE: 84 BPM | TEMPERATURE: 98 F | BODY MASS INDEX: 32.65 KG/M2

## 2024-08-16 DIAGNOSIS — N13.8 BENIGN PROSTATIC HYPERPLASIA WITH LOWER URINARY TRACT SYMPMS: ICD-10-CM

## 2024-08-16 DIAGNOSIS — C64.9 MALIGNANT NEOPLASM OF UNSPECIFIED KIDNEY, EXCEPT RENAL PELVIS: ICD-10-CM

## 2024-08-16 DIAGNOSIS — N40.1 BENIGN PROSTATIC HYPERPLASIA WITH LOWER URINARY TRACT SYMPMS: ICD-10-CM

## 2024-08-16 PROCEDURE — 99214 OFFICE O/P EST MOD 30 MIN: CPT

## 2024-08-16 RX ORDER — FINASTERIDE 5 MG/1
5 TABLET, FILM COATED ORAL DAILY
Qty: 90 | Refills: 3 | Status: ACTIVE | COMMUNITY
Start: 2024-08-16 | End: 1900-01-01

## 2024-08-19 NOTE — HISTORY OF PRESENT ILLNESS
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  Renal and Bladder US 2/26/24 bilateral renal cysts increased renal echogenicity - medical renal disease thick walled bladder enlarged prostate - 49 gram prostate  3 - Lung Cancer 2022 (s/p upper lobe pneumonectomy)  the patient is doing well offers no complaints  last CT - 7/2023 - post surgical changes 07/2023 last MRI - patient remains SAMIRA - 10/31/2022  PSA - 1.46 ng/ml 08/2024 (UA >1000 mg/dl) - no RBCs PSA - 1.96 ng/ml 02/2024 PSA - 3.32 ng/ml 03/2023 PSA - 4.03 ng/ml 12/1/2022 PSA - 7.70 ng/ml 09/22/2022 PSA - 3.79 ng/ml 11/23/2021 PSA - 3.87 ng/ml 08/12/2021 PSA 3.76 ng/ml 03/23/2021 PSA 4.15 ng/ml 02/23/2021   [None] : no symptoms

## 2024-08-19 NOTE — ASSESSMENT
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  Renal and Bladder US 2/26/24 bilateral renal cysts increased renal echogenicity - medical renal disease thick walled bladder enlarged prostate - 49 gram prostate  3 - Lung Cancer 2022 (s/p upper lobe pneumonectomy)  the patient is doing well offers no complaints  last CT - 7/2023 - post surgical changes 07/2023 last MRI - patient remains SAMIRA - 10/31/2022  PSA - 1.46 ng/ml 08/2024 (UA >1000 mg/dl) - no RBCs PSA - 1.96 ng/ml 02/2024 PSA - 3.32 ng/ml 03/2023 PSA - 4.03 ng/ml 12/1/2022 PSA - 7.70 ng/ml 09/22/2022 PSA - 3.79 ng/ml 11/23/2021 PSA - 3.87 ng/ml 08/12/2021 PSA 3.76 ng/ml 03/23/2021 PSA 4.15 ng/ml 02/23/2021   Plan 73 y/o with BPH and prior renal cell cancer - s/p partial nephrectomy doing well offers no new complaints reviewed the US in detail and medication review performed and plan of care confirmed  - renal and bladder US  - f/u in 6 months to re-assess

## 2024-08-21 LAB
APPEARANCE: CLEAR
BILIRUBIN URINE: NEGATIVE
BLOOD URINE: NEGATIVE
COLOR: YELLOW
GLUCOSE QUALITATIVE U: >=1000 MG/DL
KETONES URINE: NEGATIVE MG/DL
LEUKOCYTE ESTERASE URINE: NEGATIVE
NITRITE URINE: NEGATIVE
PH URINE: 6
PROTEIN URINE: 100 MG/DL
PSA FREE FLD-MCNC: 48 %
PSA FREE SERPL-MCNC: 0.7 NG/ML
PSA SERPL-MCNC: 1.46 NG/ML
SPECIFIC GRAVITY URINE: 1.03
URINE CYTOLOGY: NORMAL
UROBILINOGEN URINE: 0.2 MG/DL

## 2024-09-13 ENCOUNTER — APPOINTMENT (OUTPATIENT)
Dept: UROLOGY | Facility: CLINIC | Age: 74
End: 2024-09-13
Payer: MEDICARE

## 2024-09-13 VITALS
BODY MASS INDEX: 33.32 KG/M2 | HEART RATE: 77 BPM | WEIGHT: 200 LBS | SYSTOLIC BLOOD PRESSURE: 171 MMHG | TEMPERATURE: 98.1 F | OXYGEN SATURATION: 98 % | RESPIRATION RATE: 14 BRPM | DIASTOLIC BLOOD PRESSURE: 68 MMHG | HEIGHT: 65 IN

## 2024-09-13 DIAGNOSIS — N40.1 BENIGN PROSTATIC HYPERPLASIA WITH LOWER URINARY TRACT SYMPMS: ICD-10-CM

## 2024-09-13 DIAGNOSIS — N13.8 BENIGN PROSTATIC HYPERPLASIA WITH LOWER URINARY TRACT SYMPMS: ICD-10-CM

## 2024-09-13 DIAGNOSIS — R97.20 ELEVATED PROSTATE, SPECIFIC ANTIGEN [PSA]: ICD-10-CM

## 2024-09-13 DIAGNOSIS — C64.9 MALIGNANT NEOPLASM OF UNSPECIFIED KIDNEY, EXCEPT RENAL PELVIS: ICD-10-CM

## 2024-09-13 PROCEDURE — 99214 OFFICE O/P EST MOD 30 MIN: CPT

## 2024-09-16 ENCOUNTER — APPOINTMENT (OUTPATIENT)
Age: 74
End: 2024-09-16
Payer: MEDICARE

## 2024-09-16 ENCOUNTER — LABORATORY RESULT (OUTPATIENT)
Age: 74
End: 2024-09-16

## 2024-09-16 VITALS
DIASTOLIC BLOOD PRESSURE: 71 MMHG | WEIGHT: 200 LBS | HEIGHT: 65 IN | TEMPERATURE: 98.6 F | RESPIRATION RATE: 14 BRPM | SYSTOLIC BLOOD PRESSURE: 155 MMHG | BODY MASS INDEX: 33.32 KG/M2 | OXYGEN SATURATION: 98 % | HEART RATE: 86 BPM

## 2024-09-16 DIAGNOSIS — Z23 ENCOUNTER FOR IMMUNIZATION: ICD-10-CM

## 2024-09-16 DIAGNOSIS — E78.5 HYPERLIPIDEMIA, UNSPECIFIED: ICD-10-CM

## 2024-09-16 DIAGNOSIS — E11.9 TYPE 2 DIABETES MELLITUS W/OUT COMPLICATIONS: ICD-10-CM

## 2024-09-16 DIAGNOSIS — I10 ESSENTIAL (PRIMARY) HYPERTENSION: ICD-10-CM

## 2024-09-16 DIAGNOSIS — D64.9 ANEMIA, UNSPECIFIED: ICD-10-CM

## 2024-09-16 PROCEDURE — G0008: CPT

## 2024-09-16 PROCEDURE — 99214 OFFICE O/P EST MOD 30 MIN: CPT

## 2024-09-16 PROCEDURE — 36415 COLL VENOUS BLD VENIPUNCTURE: CPT

## 2024-09-16 PROCEDURE — 90662 IIV NO PRSV INCREASED AG IM: CPT

## 2024-09-16 RX ORDER — BLOOD-GLUCOSE,RECEIVER,CONT
EACH MISCELLANEOUS
Qty: 1 | Refills: 0 | Status: ACTIVE | COMMUNITY
Start: 2024-09-16 | End: 1900-01-01

## 2024-09-16 RX ORDER — BLOOD-GLUCOSE SENSOR
EACH MISCELLANEOUS
Qty: 2 | Refills: 5 | Status: ACTIVE | COMMUNITY
Start: 2024-09-16 | End: 1900-01-01
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
## 2024-09-17 LAB
ALBUMIN SERPL ELPH-MCNC: 4.4 G/DL
ALP BLD-CCNC: 142 U/L
ALT SERPL-CCNC: 19 U/L
ANION GAP SERPL CALC-SCNC: 15 MMOL/L
APPEARANCE: CLEAR
AST SERPL-CCNC: 22 U/L
BASOPHILS # BLD AUTO: 0.06 K/UL
BASOPHILS NFR BLD AUTO: 0.7 %
BILIRUB SERPL-MCNC: 0.5 MG/DL
BILIRUBIN URINE: NEGATIVE
BLOOD URINE: NEGATIVE
BUN SERPL-MCNC: 30 MG/DL
CALCIUM SERPL-MCNC: 9.5 MG/DL
CHLORIDE SERPL-SCNC: 103 MMOL/L
CHOLEST SERPL-MCNC: 141 MG/DL
CO2 SERPL-SCNC: 23 MMOL/L
COLOR: YELLOW
CREAT SERPL-MCNC: 1.66 MG/DL
CREAT SPEC-SCNC: 77 MG/DL
EGFR: 43 ML/MIN/1.73M2
EOSINOPHIL # BLD AUTO: 0.19 K/UL
EOSINOPHIL NFR BLD AUTO: 2.3 %
ESTIMATED AVERAGE GLUCOSE: 154 MG/DL
GLUCOSE QUALITATIVE U: >=1000 MG/DL
GLUCOSE SERPL-MCNC: 159 MG/DL
HBA1C MFR BLD HPLC: 7 %
HCT VFR BLD CALC: 44.2 %
HDLC SERPL-MCNC: 51 MG/DL
HGB BLD-MCNC: 13.9 G/DL
IMM GRANULOCYTES NFR BLD AUTO: 0.2 %
KETONES URINE: NEGATIVE MG/DL
LDLC SERPL CALC-MCNC: 70 MG/DL
LEUKOCYTE ESTERASE URINE: NEGATIVE
LYMPHOCYTES # BLD AUTO: 1.64 K/UL
LYMPHOCYTES NFR BLD AUTO: 19.9 %
MAN DIFF?: NORMAL
MCHC RBC-ENTMCNC: 29.1 PG
MCHC RBC-ENTMCNC: 31.4 GM/DL
MCV RBC AUTO: 92.7 FL
MICROALBUMIN 24H UR DL<=1MG/L-MCNC: 30.5 MG/DL
MICROALBUMIN/CREAT 24H UR-RTO: 396 MG/G
MONOCYTES # BLD AUTO: 0.62 K/UL
MONOCYTES NFR BLD AUTO: 7.5 %
NEUTROPHILS # BLD AUTO: 5.72 K/UL
NEUTROPHILS NFR BLD AUTO: 69.4 %
NITRITE URINE: NEGATIVE
NONHDLC SERPL-MCNC: 90 MG/DL
PH URINE: 6
PLATELET # BLD AUTO: 211 K/UL
POTASSIUM SERPL-SCNC: 4.6 MMOL/L
PROT SERPL-MCNC: 6.7 G/DL
PROTEIN URINE: 100 MG/DL
RBC # BLD: 4.77 M/UL
RBC # FLD: 14.6 %
SODIUM SERPL-SCNC: 142 MMOL/L
SPECIFIC GRAVITY URINE: 1.03
TRIGL SERPL-MCNC: 109 MG/DL
UROBILINOGEN URINE: 0.2 MG/DL
WBC # FLD AUTO: 8.25 K/UL

## 2024-09-27 ENCOUNTER — APPOINTMENT (OUTPATIENT)
Dept: NEUROLOGY | Facility: CLINIC | Age: 74
End: 2024-09-27

## 2024-09-29 ENCOUNTER — NON-APPOINTMENT (OUTPATIENT)
Age: 74
End: 2024-09-29

## 2024-09-30 ENCOUNTER — APPOINTMENT (OUTPATIENT)
Dept: SPINE | Facility: CLINIC | Age: 74
End: 2024-09-30
Payer: MEDICARE

## 2024-09-30 DIAGNOSIS — M48.061 SPINAL STENOSIS, LUMBAR REGION WITHOUT NEUROGENIC CLAUDICATION: ICD-10-CM

## 2024-09-30 DIAGNOSIS — Z98.1 ARTHRODESIS STATUS: ICD-10-CM

## 2024-09-30 PROCEDURE — 99213 OFFICE O/P EST LOW 20 MIN: CPT

## 2024-09-30 NOTE — ASSESSMENT
[FreeTextEntry1] : 74 year old male now 8 years postop left retroperitoneal fusion with ongoing lower back pain. Continue PT and do daily exercises.  He is walking well. No numbness or tingling down legs. He just had MRI lumbar spine. Surgery site looks good. At the 2 levels above he has some issues. Has advanced arthritic changes.  Had epidural injections about 1 month ago. It has not responded to conservative pain management. He may need more surgery. He should return after further testing to discuss surgery.   PLAN: -Lumbar flexion/extension x rays -Scoliosis series -CT scan lumbar spine -MRI thoracic spine to assess T9-10 region  -return to office once imaging is done

## 2024-09-30 NOTE — HISTORY OF PRESENT ILLNESS
[FreeTextEntry1] : HERMANN JAVIER is a 74 year old male who is seen today for ongoing lower back. Pain does not radiate. Eight years ago he underwent a left retro peritoneal exposure L 4 L 5 fusion. He has difficulty walking secondary to right knee pain. Takes Tylenol with little relief.  He is forced to walk in a stooped fashion in order to minimize his pain. If he tries to stand up straight he developed significant pain. Physical therapy is ongoing with minimal relief. Denies any urinary or bowel dysfunction. He clearly states that his knee pain is worse than his back pain. He has plans to see an orthopedic knee specialist.  Today he presents for follow up visit. He had a b/l knee replacement. He is doing well. He is walking well. He has pain in his back. He can flex forward with no pain, but extension elicits severe pain. Denies numbness or tingling in legs. He recently did PT. He saw chiropractor 3-4x. He tried acupuncture. He has no recent x-rays. He is on Plavix because he has 2 stents (15 years ago). Lumbar MRI scan shows advanced Modic changes with significant stenosis and listhesis at L2-3 and L3-4.  With the patient had his previous surgery at L4-5 it looks fine.  There is also what appears to be some thoracic stenosis at the T10-11 level.  But there are no axial views through this.  He has significant back pain without any radicular type symptoms.  This has been refractory to chiropractic treatment, physical therapy, epidural steroid injections and acupuncture.

## 2024-09-30 NOTE — PHYSICAL EXAM
[General Appearance - Alert] : alert [General Appearance - In No Acute Distress] : in no acute distress [Oriented To Time, Place, And Person] : oriented to person, place, and time [Motor Strength] : muscle strength was normal in all four extremities [Abnormal Walk] : normal gait [Balance] : balance was intact [] : no respiratory distress [Respiration, Rhythm And Depth] : normal respiratory rhythm and effort [Sensation Tactile Decrease] : light touch was intact [2+] : Ankle jerk left 2+

## 2024-10-11 ENCOUNTER — APPOINTMENT (OUTPATIENT)
Age: 74
End: 2024-10-11

## 2024-10-24 ENCOUNTER — APPOINTMENT (OUTPATIENT)
Dept: ORTHOPEDIC SURGERY | Facility: CLINIC | Age: 74
End: 2024-10-24
Payer: MEDICARE

## 2024-10-24 VITALS — HEIGHT: 65 IN | WEIGHT: 199 LBS | BODY MASS INDEX: 33.15 KG/M2

## 2024-10-24 PROCEDURE — 99213 OFFICE O/P EST LOW 20 MIN: CPT

## 2024-10-24 PROCEDURE — G2211 COMPLEX E/M VISIT ADD ON: CPT

## 2024-10-24 PROCEDURE — 73564 X-RAY EXAM KNEE 4 OR MORE: CPT | Mod: RT

## (undated) DEVICE — MARKING PEN W RULER

## (undated) DEVICE — HOOD FLYTE STRYKER HELMET SHIELD

## (undated) DEVICE — SAW BLADE STRYKER SAGITTAL DUAL CUT 64X35X.89MM

## (undated) DEVICE — Device

## (undated) DEVICE — DRAPE 3/4 SHEET 52X76"

## (undated) DEVICE — DRAPE SURGICAL #1010

## (undated) DEVICE — SUT NOVAFIL 0 30" T-19

## (undated) DEVICE — SUT VICRYL 3-0 18" PS-2 UNDYED

## (undated) DEVICE — POSITIONER FOAM EGG CRATE ULNAR 2PCS (PINK)

## (undated) DEVICE — ELCTR BOVIE PENCIL SMOKE EVACUATION

## (undated) DEVICE — DRSG AQUACEL 3.5 X 12"

## (undated) DEVICE — TUBING TUR 2 PRONG

## (undated) DEVICE — MAKO CHECKPOINT KIT FEMORAL / TIBIAL

## (undated) DEVICE — DRSG DERMABOND 0.7ML

## (undated) DEVICE — GLV 8 PROTEXIS (WHITE)

## (undated) DEVICE — VENODYNE/SCD SLEEVE CALF MEDIUM

## (undated) DEVICE — GLV 8.5 PROTEXIS (WHITE)

## (undated) DEVICE — SUT VICRYL 1 27" CPX UNDYED

## (undated) DEVICE — MAKO VIZADISC KNEE TRACKING KIT

## (undated) DEVICE — DRAPE TOWEL BLUE 17" X 24"

## (undated) DEVICE — SUT VICRYL 2-0 27" SH

## (undated) DEVICE — GOWN XL

## (undated) DEVICE — SUT PDS II 0 27" CT-1

## (undated) DEVICE — SUT QUILL MONODERM 2-0 3/8 CIRCLE 45CM

## (undated) DEVICE — SUT PDS II 1 48" TP-1

## (undated) DEVICE — PACK EXPLORATORY LAPAROTOMY

## (undated) DEVICE — MAKO DRAPE KIT

## (undated) DEVICE — SUT PDO 2 1/2 CIRCLE 40MM NDL 45CM

## (undated) DEVICE — DRAPE 1/2 SHEET 40X57"

## (undated) DEVICE — PACK GENERAL MINOR

## (undated) DEVICE — TOURNIQUET CUFF 34" DUAL PORT W PLC

## (undated) DEVICE — SUT VICRYL 2-0 18" TIES

## (undated) DEVICE — WARMING BLANKET UPPER ADULT

## (undated) DEVICE — GLV 7.5 PROTEXIS (WHITE)

## (undated) DEVICE — SUT MONOCRYL 4-0 18" PS-2

## (undated) DEVICE — IRR SYS BONE CLNNG INTERPULSE

## (undated) DEVICE — SOL INJ NS 0.9% 500ML 1-PORT

## (undated) DEVICE — POSITIONER STRAP KNEE & BODY 3X60" DISP

## (undated) DEVICE — SUT QUILL MONODERM 0 1/2 CIRCLE TAPR 45CM 26MM

## (undated) DEVICE — SUT NOVAFIL 2-0 30" T-19

## (undated) DEVICE — SUT QUILL PDO 1 45CM CTX 48MM

## (undated) DEVICE — POOLE SUCTION TIP

## (undated) DEVICE — SUT MONOCRYL 2-0 27" SH UNDYED

## (undated) DEVICE — DRSG PREVENA PEEL & PLACE KIT 20CM

## (undated) DEVICE — SUT VICRYL 0 36" CT-1

## (undated) DEVICE — LIGASURE IMPACT

## (undated) DEVICE — WARMING BLANKET LOWER ADULT

## (undated) DEVICE — STRYKER INTERPULSE HANDPIECE W IRR SUCTION TUBE

## (undated) DEVICE — SOL IRR POUR H2O 250ML

## (undated) DEVICE — MAKO BLADE STANDARD

## (undated) DEVICE — PACK MIS KNEE (1 PIECE)

## (undated) DEVICE — GOWN SURGEON VEST

## (undated) DEVICE — SOL IRR POUR NS 0.9% 500ML

## (undated) DEVICE — GLV 8.5 PROTEXIS (BLUE)

## (undated) DEVICE — BAG DECANTER IV STERILE

## (undated) DEVICE — POSITIONER FOAM HEADREST (PINK)

## (undated) DEVICE — MAKO BLADE NARROW

## (undated) DEVICE — SUT VICRYL 3-0 27" SH

## (undated) DEVICE — HOOD FLYTE STRYKER SURGICOOL W PEELAWAY

## (undated) DEVICE — SOL INJ NS 0.9% 1000ML

## (undated) DEVICE — SUT VLOC 180 0 9" GS-21 GREEN

## (undated) DEVICE — STAPLER SKIN PROXIMATE